# Patient Record
Sex: FEMALE | Race: OTHER | Employment: UNEMPLOYED | URBAN - METROPOLITAN AREA
[De-identification: names, ages, dates, MRNs, and addresses within clinical notes are randomized per-mention and may not be internally consistent; named-entity substitution may affect disease eponyms.]

---

## 2017-08-19 ENCOUNTER — APPOINTMENT (OUTPATIENT)
Dept: GENERAL RADIOLOGY | Age: 52
End: 2017-08-19
Attending: PHYSICIAN ASSISTANT
Payer: COMMERCIAL

## 2017-08-19 ENCOUNTER — HOSPITAL ENCOUNTER (EMERGENCY)
Age: 52
Discharge: HOME | End: 2017-08-20
Attending: EMERGENCY MEDICINE
Payer: COMMERCIAL

## 2017-08-19 VITALS
TEMPERATURE: 99.5 F | DIASTOLIC BLOOD PRESSURE: 81 MMHG | WEIGHT: 147 LBS | BODY MASS INDEX: 24.49 KG/M2 | SYSTOLIC BLOOD PRESSURE: 134 MMHG | RESPIRATION RATE: 18 BRPM | HEIGHT: 65 IN | HEART RATE: 96 BPM | OXYGEN SATURATION: 100 %

## 2017-08-19 DIAGNOSIS — S82.891A ANKLE FRACTURE, RIGHT, CLOSED, INITIAL ENCOUNTER: Primary | ICD-10-CM

## 2017-08-19 PROCEDURE — 75810000053 HC SPLINT APPLICATION

## 2017-08-19 PROCEDURE — 74011250636 HC RX REV CODE- 250/636: Performed by: PHYSICIAN ASSISTANT

## 2017-08-19 PROCEDURE — 96372 THER/PROPH/DIAG INJ SC/IM: CPT

## 2017-08-19 PROCEDURE — 99284 EMERGENCY DEPT VISIT MOD MDM: CPT

## 2017-08-19 PROCEDURE — 73610 X-RAY EXAM OF ANKLE: CPT

## 2017-08-19 PROCEDURE — 74011250637 HC RX REV CODE- 250/637: Performed by: PHYSICIAN ASSISTANT

## 2017-08-19 RX ORDER — HYDROMORPHONE HYDROCHLORIDE 1 MG/ML
1 INJECTION, SOLUTION INTRAMUSCULAR; INTRAVENOUS; SUBCUTANEOUS
Status: COMPLETED | OUTPATIENT
Start: 2017-08-19 | End: 2017-08-19

## 2017-08-19 RX ORDER — ONDANSETRON 8 MG/1
8 TABLET, ORALLY DISINTEGRATING ORAL
Status: COMPLETED | OUTPATIENT
Start: 2017-08-19 | End: 2017-08-19

## 2017-08-19 RX ORDER — OXYCODONE AND ACETAMINOPHEN 5; 325 MG/1; MG/1
TABLET ORAL
Qty: 20 TAB | Refills: 0 | Status: ON HOLD | OUTPATIENT
Start: 2017-08-19 | End: 2017-08-31

## 2017-08-19 RX ADMIN — ONDANSETRON 8 MG: 8 TABLET, ORALLY DISINTEGRATING ORAL at 22:57

## 2017-08-19 RX ADMIN — HYDROMORPHONE HYDROCHLORIDE 1 MG: 1 INJECTION, SOLUTION INTRAMUSCULAR; INTRAVENOUS; SUBCUTANEOUS at 22:58

## 2017-08-20 PROCEDURE — 74011250637 HC RX REV CODE- 250/637: Performed by: EMERGENCY MEDICINE

## 2017-08-20 PROCEDURE — 75810000053 HC SPLINT APPLICATION

## 2017-08-20 RX ORDER — HYDROMORPHONE HYDROCHLORIDE 1 MG/ML
1 INJECTION, SOLUTION INTRAMUSCULAR; INTRAVENOUS; SUBCUTANEOUS
Status: DISCONTINUED | OUTPATIENT
Start: 2017-08-20 | End: 2017-08-20 | Stop reason: CLARIF

## 2017-08-20 RX ORDER — OXYCODONE AND ACETAMINOPHEN 5; 325 MG/1; MG/1
1 TABLET ORAL
Status: COMPLETED | OUTPATIENT
Start: 2017-08-20 | End: 2017-08-20

## 2017-08-20 RX ADMIN — OXYCODONE HYDROCHLORIDE AND ACETAMINOPHEN 1 TABLET: 5; 325 TABLET ORAL at 02:28

## 2017-08-20 NOTE — DISCHARGE INSTRUCTIONS
Broken Ankle: Care Instructions  Your Care Instructions    An ankle may break (fracture) during sports, a fall, or other accidents. Fractures can range from a small, hairline crack, to a bone or bones broken into two or more pieces. Your treatment depends on how bad the break is. Your doctor may have put your ankle in a splint or cast to allow it to heal or to keep it stable until you see another doctor. It may take weeks or months for your ankle to heal. You can help your ankle heal with some care at home. You heal best when you take good care of yourself. Eat a variety of healthy foods, and don't smoke. You may have had a sedative to help you relax. You may be unsteady after having sedation. It can take a few hours for the medicine's effects to wear off. Common side effects of sedation include nausea, vomiting, and feeling sleepy or tired. The doctor has checked you carefully, but problems can develop later. If you notice any problems or new symptoms, get medical treatment right away. Follow-up care is a key part of your treatment and safety. Be sure to make and go to all appointments, and call your doctor if you are having problems. It's also a good idea to know your test results and keep a list of the medicines you take. How can you care for yourself at home? · If the doctor gave you a sedative:  ¨ For 24 hours, don't do anything that requires attention to detail. It takes time for the medicine's effects to completely wear off. ¨ For your safety, do not drive or operate any machinery that could be dangerous. Wait until the medicine wears off and you can think clearly and react easily. · Put ice or a cold pack on your ankle for 10 to 20 minutes at a time. Try to do this every 1 to 2 hours for the next 3 days (when you are awake). Put a thin cloth between the ice and your cast or splint. Keep your cast or splint dry. · Follow the cast care instructions your doctor gives you.  If you have a splint, do not take it off unless your doctor tells you to. · Be safe with medicines. Take pain medicines exactly as directed. ¨ If the doctor gave you a prescription medicine for pain, take it as prescribed. ¨ If you are not taking a prescription pain medicine, ask your doctor if you can take an over-the-counter medicine. · Prop up your leg on pillows in the first few days after the injury. Keep the ankle higher than the level of your heart. This will help reduce swelling. · Do not put weight on your ankle unless your doctor tells you to. Use crutches to walk. · Follow instructions for exercises to keep your leg strong. · Wiggle your toes often to reduce swelling and stiffness. When should you call for help? Call 911 anytime you think you may need emergency care. For example, call if:  · You have trouble breathing. · You passed out (lost consciousness). · You have sudden chest pain and shortness of breath, or you cough up blood. Call your doctor now or seek immediate medical care if:  · You have new or worse nausea or vomiting. · You have increased or severe pain. · Your foot is cool or pale or changes color. · You have tingling, weakness, or numbness in your toes. · Your cast or splint feels too tight. · You cannot move your toes. · You have signs of a blood clot, such as:  ¨ Pain in your calf, back of the knee, thigh, or groin. ¨ Redness and swelling in your leg or groin. · The skin under your cast or splint is burning or stinging. Watch closely for changes in your health, and be sure to contact your doctor if:  · You do not get better as expected. Where can you learn more? Go to http://angela-michelle.info/. Enter P763 in the search box to learn more about \"Broken Ankle: Care Instructions. \"  Current as of: March 21, 2017  Content Version: 11.3  © 6387-3957 [employer].  Care instructions adapted under license by [employer] (which disclaims liability or warranty for this information). If you have questions about a medical condition or this instruction, always ask your healthcare professional. [place] [address] any warranty or liability for your use of this information.

## 2017-08-20 NOTE — ED PROVIDER NOTES
HPI Comments: Seen at: 8/19/2017 11:03 PM    Carisa Bernstein is a 45 y.o. female with pertinent PMHx of plantar fasciitis and sinus tachycardia, presenting to the ED c/o right ankle pain post fall earlier tonight. Per pt, she has an orthopedic boot on. She loss her balance and fell. Pain is exacerbated by weight bearing. No other acute symptoms or complaints were noted. PCP: Samantha Ulrich MD          The history is provided by the patient. No  was used. Past Medical History:   Diagnosis Date    Colon polyps     Constipation     Heart murmur     Hemorrhoids     Mitral valve prolapse     Nausea & vomiting     \"VIOLENTLY SICK AFTER TUBAL LIGATION\"    Plantar fasciitis     Vestibular neuritis        Past Surgical History:   Procedure Laterality Date    HX BREAST LUMPECTOMY      HX COLONOSCOPY      2010    HX CYST REMOVAL  2010    right breast    HX GYN      hysterectomy    HX HEENT      tonselectomy    HX HEENT      lasik eye surgery    HX OTHER SURGICAL      cystoscopy    HX OTHER SURGICAL  2010    right ovary removed    HX TUBAL LIGATION           Family History:   Problem Relation Age of Onset    Cancer Sister      colon    Cancer Father      colon    Cancer Maternal Grandmother      colon       Social History     Social History    Marital status: SINGLE     Spouse name: N/A    Number of children: N/A    Years of education: N/A     Occupational History    Not on file. Social History Main Topics    Smoking status: Former Smoker    Smokeless tobacco: Never Used    Alcohol use No      Comment: social (1 Glass of wine per week)    Drug use: No    Sexual activity: Not on file     Other Topics Concern    Not on file     Social History Narrative         ALLERGIES: Latex; Amoxicillin; Flagyl [metronidazole]; and Urised [meth-hyos-atrp-m blue-ba-phsal]    Review of Systems   Constitutional: Negative. Negative for fever. HENT: Negative. Eyes: Negative. Respiratory: Negative. Cardiovascular: Negative. Gastrointestinal: Negative. Endocrine: Negative. Genitourinary: Negative. Musculoskeletal: Positive for arthralgias. Skin: Negative. Allergic/Immunologic: Negative. Neurological: Negative. Hematological: Negative. Psychiatric/Behavioral: Negative. All other systems reviewed and are negative. Vitals:    08/19/17 2246   BP: 134/81   Pulse: 96   Resp: 18   Temp: 99.5 °F (37.5 °C)   SpO2: 100%   Weight: 66.7 kg (147 lb)   Height: 5' 5\" (1.651 m)            Physical Exam   Constitutional: She is oriented to person, place, and time. She appears well-developed and well-nourished. No distress. HENT:   Head: Normocephalic. Right Ear: External ear normal.   Left Ear: External ear normal.   Mouth/Throat: No oropharyngeal exudate. Eyes: Conjunctivae and EOM are normal. Pupils are equal, round, and reactive to light. Right eye exhibits no discharge. Left eye exhibits no discharge. No scleral icterus. Neck: Normal range of motion. Neck supple. No JVD present. No tracheal deviation present. No thyromegaly present. Cardiovascular: Normal rate, regular rhythm, normal heart sounds and intact distal pulses. Exam reveals no gallop and no friction rub. No murmur heard. Pulmonary/Chest: Effort normal and breath sounds normal. No stridor. No respiratory distress. She has no wheezes. She has no rales. She exhibits no tenderness. Abdominal: Soft. Bowel sounds are normal. She exhibits no distension and no mass. There is no tenderness. There is no rebound and no guarding. Musculoskeletal: She exhibits no edema. Right ankle: She exhibits decreased range of motion (secondary to pain). She exhibits no swelling and normal pulse. Tenderness (significant). Lymphadenopathy:     She has no cervical adenopathy. Neurological: She is alert and oriented to person, place, and time. She displays normal reflexes. No cranial nerve deficit. She exhibits normal muscle tone. Coordination normal.   Skin: Skin is warm and dry. No rash noted. She is not diaphoretic. No erythema. No pallor. Nursing note and vitals reviewed. MDM  Number of Diagnoses or Management Options  Ankle fracture, right, closed, initial encounter:      Amount and/or Complexity of Data Reviewed  Tests in the radiology section of CPT®: ordered and reviewed (XR RT ankle)  Independent visualization of images, tracings, or specimens: yes (XR)      ED Course       Procedures    Vitals:  Patient Vitals for the past 12 hrs:   Temp Pulse Resp BP SpO2   08/19/17 2246 99.5 °F (37.5 °C) 96 18 134/81 100 %       Medications Ordered:  Medications   HYDROmorphone (PF) (DILAUDID) injection 1 mg (1 mg IntraMUSCular Given 8/19/17 2258)   ondansetron (ZOFRAN ODT) tablet 8 mg (8 mg Oral Given 8/19/17 2257)       X-ray, CT or radiology findings or impressions:  XR ANKLE RT MIN 3 V   Final Result      XR interpreted by myself showed tri-malleolus fx with no displacement. No other fx. Progress notes, consult notes, or additional procedure notes:  11:22 PM Consult: I discussed care with Dr. Sarah Mesa (ortho). It was a standard discussion including patient history, chief complaint, available diagnostic results, and predicted treatment course. Dr. Wero Singleton recommended posterior splint and follow up in clinic next week. 01:27 AM Pt has been reexamined. Splint placement was evaluated by myself. Neurovascularly intact. Patient has no new complaints, changes, or physical findings. Care plan outlined and precautions discussed. Results were reviewed with the patient. All medications were reviewed with the patient; will d/c home with percocet. All of pt's questions and concerns were addressed. Patient was instructed and agrees to follow up with Dr. Wero Singleton next week, as well as to return to the ED upon further deterioration. Patient is ready to go home.  I advised pt to a roll-a-bout for ambulation. Diagnosis:   1. Ankle fracture, right, closed, initial encounter        Disposition: Discharge    Follow-up Information     Follow up With Details Comments Le Shaffer MD Schedule an appointment as soon as possible for a visit Follow up with Dr. Ovidio Hair in clinic next week. [facility]  As needed, If symptoms worsen [address] [id number]  [phone]           Patient's Medications   Start Taking    OXYCODONE-ACETAMINOPHEN (PERCOCET) 5-325 MG PER TABLET    Take 1 to 2  tablet every 6 hours as needed for pain control. If you were instructed to try over the counter ibuprofen or tylenol, only take the percocet for pain not controlled with the over the counter medication. Continue Taking    MIRABEGRON (MYRBETRIQ PO)    Take  by mouth. These Medications have changed    No medications on file   Stop Taking    No medications on file       Scribe Attestation      Francia Ordonez acting as a scribe for and in the presence of Sparkle Franco MD      August 19, 2017 at 11:00 PM       Provider Attestation:      I personally performed the services described in the documentation, reviewed the documentation, as recorded by the scribe in my presence, and it accurately and completely records my words and actions.  August 19, 2017 at 11:00 PM - Sparkle Franco MD

## 2017-08-20 NOTE — ED NOTES
I performed a brief evaluation, including history and physical, of the patient here in triage and I have determined that pt will need further treatment and evaluation from the main side ER physician. I have placed initial orders to help in expediting patients care.      August 19, 2017 at 10:56 PM - JOY Garg        Visit Vitals    /81 (BP Patient Position: At rest)    Pulse 96    Temp 99.5 °F (37.5 °C)    Resp 18    Ht 5' 5\" (1.651 m)    Wt 66.7 kg (147 lb)    SpO2 100%    BMI 24.46 kg/m2

## 2017-08-29 ENCOUNTER — ANESTHESIA EVENT (OUTPATIENT)
Dept: SURGERY | Age: 52
End: 2017-08-29
Payer: COMMERCIAL

## 2017-08-30 ENCOUNTER — HOSPITAL ENCOUNTER (OUTPATIENT)
Age: 52
Setting detail: OBSERVATION
LOS: 1 days | Discharge: HOME | End: 2017-08-31
Attending: ORTHOPAEDIC SURGERY | Admitting: ORTHOPAEDIC SURGERY
Payer: COMMERCIAL

## 2017-08-30 ENCOUNTER — ANESTHESIA (OUTPATIENT)
Dept: SURGERY | Age: 52
End: 2017-08-30
Payer: COMMERCIAL

## 2017-08-30 ENCOUNTER — APPOINTMENT (OUTPATIENT)
Dept: GENERAL RADIOLOGY | Age: 52
End: 2017-08-30
Attending: ORTHOPAEDIC SURGERY
Payer: COMMERCIAL

## 2017-08-30 PROBLEM — S82.839A FRACTURE OF DISTAL END OF TIBIA WITH FIBULA: Status: ACTIVE | Noted: 2017-08-30

## 2017-08-30 PROBLEM — S82.309A FRACTURE OF DISTAL END OF TIBIA WITH FIBULA: Status: ACTIVE | Noted: 2017-08-30

## 2017-08-30 PROCEDURE — 74011000250 HC RX REV CODE- 250: Performed by: ORTHOPAEDIC SURGERY

## 2017-08-30 PROCEDURE — 77030008462 HC STPLR SKN PROX J&J -A: Performed by: ORTHOPAEDIC SURGERY

## 2017-08-30 PROCEDURE — 77030000032 HC CUF TRNQT ZIMM -B: Performed by: ORTHOPAEDIC SURGERY

## 2017-08-30 PROCEDURE — 77030016470 HC BIT DRL QC1 SYNT -C: Performed by: ORTHOPAEDIC SURGERY

## 2017-08-30 PROCEDURE — 77030010509 HC AIRWY LMA MSK TELE -A: Performed by: ANESTHESIOLOGY

## 2017-08-30 PROCEDURE — 74011000258 HC RX REV CODE- 258

## 2017-08-30 PROCEDURE — C1713 ANCHOR/SCREW BN/BN,TIS/BN: HCPCS | Performed by: ORTHOPAEDIC SURGERY

## 2017-08-30 PROCEDURE — 64450 NJX AA&/STRD OTHER PN/BRANCH: CPT | Performed by: ANESTHESIOLOGY

## 2017-08-30 PROCEDURE — 77030000031 HC BIT DRL QC SYNT -C: Performed by: ORTHOPAEDIC SURGERY

## 2017-08-30 PROCEDURE — 99218 HC RM OBSERVATION: CPT

## 2017-08-30 PROCEDURE — 77030003601 HC NDL NRV BLK BBMI -A: Performed by: ORTHOPAEDIC SURGERY

## 2017-08-30 PROCEDURE — 74011250636 HC RX REV CODE- 250/636: Performed by: ORTHOPAEDIC SURGERY

## 2017-08-30 PROCEDURE — 74011000250 HC RX REV CODE- 250

## 2017-08-30 PROCEDURE — 77030032490 HC SLV COMPR SCD KNE COVD -B: Performed by: ORTHOPAEDIC SURGERY

## 2017-08-30 PROCEDURE — 77030012890: Performed by: ORTHOPAEDIC SURGERY

## 2017-08-30 PROCEDURE — 74011000258 HC RX REV CODE- 258: Performed by: ORTHOPAEDIC SURGERY

## 2017-08-30 PROCEDURE — 77030003862 HC BIT DRL SYNT -B: Performed by: ORTHOPAEDIC SURGERY

## 2017-08-30 PROCEDURE — 77030002934 HC SUT MCRYL J&J -B: Performed by: ORTHOPAEDIC SURGERY

## 2017-08-30 PROCEDURE — 77030011640 HC PAD GRND REM COVD -A: Performed by: ORTHOPAEDIC SURGERY

## 2017-08-30 PROCEDURE — 74011250636 HC RX REV CODE- 250/636: Performed by: NURSE ANESTHETIST, CERTIFIED REGISTERED

## 2017-08-30 PROCEDURE — 77030020782 HC GWN BAIR PAWS FLX 3M -B: Performed by: ORTHOPAEDIC SURGERY

## 2017-08-30 PROCEDURE — 77030008847 HC WRE K SYNT -A: Performed by: ORTHOPAEDIC SURGERY

## 2017-08-30 PROCEDURE — 73610 X-RAY EXAM OF ANKLE: CPT

## 2017-08-30 PROCEDURE — 77030027138 HC INCENT SPIROMETER -A

## 2017-08-30 PROCEDURE — 76010000134 HC OR TIME 3.5 TO 4 HR: Performed by: ORTHOPAEDIC SURGERY

## 2017-08-30 PROCEDURE — 74011250636 HC RX REV CODE- 250/636: Performed by: ANESTHESIOLOGY

## 2017-08-30 PROCEDURE — 77030011265 HC ELECTRD BLD HEX COVD -A: Performed by: ORTHOPAEDIC SURGERY

## 2017-08-30 PROCEDURE — 74011250636 HC RX REV CODE- 250/636

## 2017-08-30 PROCEDURE — 76060000038 HC ANESTHESIA 3.5 TO 4 HR: Performed by: ORTHOPAEDIC SURGERY

## 2017-08-30 PROCEDURE — 74011250637 HC RX REV CODE- 250/637: Performed by: ANESTHESIOLOGY

## 2017-08-30 PROCEDURE — 77030018836 HC SOL IRR NACL ICUM -A: Performed by: ORTHOPAEDIC SURGERY

## 2017-08-30 PROCEDURE — 76942 ECHO GUIDE FOR BIOPSY: CPT | Performed by: ANESTHESIOLOGY

## 2017-08-30 PROCEDURE — 74011250637 HC RX REV CODE- 250/637: Performed by: NURSE ANESTHETIST, CERTIFIED REGISTERED

## 2017-08-30 PROCEDURE — 77030031139 HC SUT VCRL2 J&J -A: Performed by: ORTHOPAEDIC SURGERY

## 2017-08-30 PROCEDURE — 77030034850: Performed by: ORTHOPAEDIC SURGERY

## 2017-08-30 PROCEDURE — 76210000006 HC OR PH I REC 0.5 TO 1 HR: Performed by: ORTHOPAEDIC SURGERY

## 2017-08-30 PROCEDURE — 77030012012 HC AIRWY OP SFT TELE -A: Performed by: ANESTHESIOLOGY

## 2017-08-30 DEVICE — SCREW BNE L42MM DIA2.7MM ANK S STL ST VAR ANG LOK FULL THRD: Type: IMPLANTABLE DEVICE | Site: TIBIA | Status: FUNCTIONAL

## 2017-08-30 DEVICE — K WIRE FIX L150MM DIA2MM S STL W/ TRCR PNT: Type: IMPLANTABLE DEVICE | Site: FIBULA | Status: FUNCTIONAL

## 2017-08-30 DEVICE — 2.7MM VA LCKNG SCREW SLF-TPNG WITH T8 STARDRIVE RECESS 18MM: Type: IMPLANTABLE DEVICE | Site: TIBIA | Status: FUNCTIONAL

## 2017-08-30 DEVICE — SCREW BNE L30MM DIA3.5MM CORT S STL ST FULL THRD T15 DRV: Type: IMPLANTABLE DEVICE | Site: TIBIA | Status: FUNCTIONAL

## 2017-08-30 DEVICE — IMPLANTABLE DEVICE: Type: IMPLANTABLE DEVICE | Site: TIBIA | Status: FUNCTIONAL

## 2017-08-30 DEVICE — SCREW BNE L38MM DIA2.7MM S STL ST VAR ANG LOK FULL THRD T8: Type: IMPLANTABLE DEVICE | Site: TIBIA | Status: FUNCTIONAL

## 2017-08-30 DEVICE — PLATE BNE L92MM 4 H R LAT DST FIBULAR S STL VAR ANG LOK: Type: IMPLANTABLE DEVICE | Site: FIBULA | Status: FUNCTIONAL

## 2017-08-30 DEVICE — SCREW BNE L40MM DIA2.7MM MTPHSEAL EL S STL ST VAR ANG LOK: Type: IMPLANTABLE DEVICE | Site: TIBIA | Status: FUNCTIONAL

## 2017-08-30 DEVICE — SCREW BNE L34MM DIA2.7MM CORT S STL ST T8 STARDRV RECESS: Type: IMPLANTABLE DEVICE | Site: FIBULA | Status: FUNCTIONAL

## 2017-08-30 DEVICE — SCREW BNE L24MM DIA3.5MM ANK S STL LOK ST STARDRV RECESS: Type: IMPLANTABLE DEVICE | Site: TIBIA | Status: FUNCTIONAL

## 2017-08-30 DEVICE — SCREW BNE L14MM DIA2.7MM ANK S STL ST VAR ANG LOK FULL THRD: Type: IMPLANTABLE DEVICE | Site: FIBULA | Status: FUNCTIONAL

## 2017-08-30 DEVICE — SCREW BNE L24MM DIA3.5MM CORT S STL ST FULL THRD T15: Type: IMPLANTABLE DEVICE | Site: TIBIA | Status: FUNCTIONAL

## 2017-08-30 DEVICE — SCREW BNE L12MM DIA2.7MM ANK S STL ST VAR ANG LOK FULL THRD: Type: IMPLANTABLE DEVICE | Site: TIBIA | Status: FUNCTIONAL

## 2017-08-30 DEVICE — SCREW BNE L26MM DIA2.7MM CORT S STL ST T8 STARDRV RECESS: Type: IMPLANTABLE DEVICE | Site: FIBULA | Status: FUNCTIONAL

## 2017-08-30 DEVICE — SCREW BNE L30MM DIA2.7MM CORT S STL ST T8 STARDRV RECESS: Type: IMPLANTABLE DEVICE | Site: FIBULA | Status: FUNCTIONAL

## 2017-08-30 RX ORDER — FENTANYL CITRATE 50 UG/ML
100 INJECTION, SOLUTION INTRAMUSCULAR; INTRAVENOUS ONCE
Status: COMPLETED | OUTPATIENT
Start: 2017-08-30 | End: 2017-08-30

## 2017-08-30 RX ORDER — HYDROMORPHONE HYDROCHLORIDE 1 MG/ML
1 INJECTION, SOLUTION INTRAMUSCULAR; INTRAVENOUS; SUBCUTANEOUS
Status: DISCONTINUED | OUTPATIENT
Start: 2017-08-30 | End: 2017-08-31 | Stop reason: HOSPADM

## 2017-08-30 RX ORDER — PROPOFOL 10 MG/ML
INJECTION, EMULSION INTRAVENOUS AS NEEDED
Status: DISCONTINUED | OUTPATIENT
Start: 2017-08-30 | End: 2017-08-30 | Stop reason: HOSPADM

## 2017-08-30 RX ORDER — FAMOTIDINE 20 MG/1
20 TABLET, FILM COATED ORAL ONCE
Status: COMPLETED | OUTPATIENT
Start: 2017-08-30 | End: 2017-08-30

## 2017-08-30 RX ORDER — ONDANSETRON 4 MG/1
4-8 TABLET, ORALLY DISINTEGRATING ORAL
Status: DISCONTINUED | OUTPATIENT
Start: 2017-08-30 | End: 2017-08-31 | Stop reason: HOSPADM

## 2017-08-30 RX ORDER — ONDANSETRON 2 MG/ML
4 INJECTION INTRAMUSCULAR; INTRAVENOUS ONCE
Status: DISCONTINUED | OUTPATIENT
Start: 2017-08-30 | End: 2017-08-30 | Stop reason: HOSPADM

## 2017-08-30 RX ORDER — FENTANYL CITRATE 50 UG/ML
INJECTION, SOLUTION INTRAMUSCULAR; INTRAVENOUS AS NEEDED
Status: DISCONTINUED | OUTPATIENT
Start: 2017-08-30 | End: 2017-08-30 | Stop reason: HOSPADM

## 2017-08-30 RX ORDER — SODIUM CHLORIDE 0.9 % (FLUSH) 0.9 %
5-10 SYRINGE (ML) INJECTION AS NEEDED
Status: DISCONTINUED | OUTPATIENT
Start: 2017-08-30 | End: 2017-08-31 | Stop reason: HOSPADM

## 2017-08-30 RX ORDER — LIDOCAINE HYDROCHLORIDE 20 MG/ML
INJECTION, SOLUTION EPIDURAL; INFILTRATION; INTRACAUDAL; PERINEURAL AS NEEDED
Status: DISCONTINUED | OUTPATIENT
Start: 2017-08-30 | End: 2017-08-30 | Stop reason: HOSPADM

## 2017-08-30 RX ORDER — SCOLOPAMINE TRANSDERMAL SYSTEM 1 MG/1
1.5 PATCH, EXTENDED RELEASE TRANSDERMAL
Status: DISCONTINUED | OUTPATIENT
Start: 2017-08-30 | End: 2017-08-31 | Stop reason: HOSPADM

## 2017-08-30 RX ORDER — HYDROMORPHONE HYDROCHLORIDE 2 MG/ML
0.5 INJECTION, SOLUTION INTRAMUSCULAR; INTRAVENOUS; SUBCUTANEOUS
Status: DISCONTINUED | OUTPATIENT
Start: 2017-08-30 | End: 2017-08-30 | Stop reason: HOSPADM

## 2017-08-30 RX ORDER — ONDANSETRON 2 MG/ML
INJECTION INTRAMUSCULAR; INTRAVENOUS AS NEEDED
Status: DISCONTINUED | OUTPATIENT
Start: 2017-08-30 | End: 2017-08-30 | Stop reason: HOSPADM

## 2017-08-30 RX ORDER — SODIUM CHLORIDE 0.9 % (FLUSH) 0.9 %
5-10 SYRINGE (ML) INJECTION EVERY 8 HOURS
Status: DISCONTINUED | OUTPATIENT
Start: 2017-08-30 | End: 2017-08-31 | Stop reason: HOSPADM

## 2017-08-30 RX ORDER — SODIUM CHLORIDE 0.9 % (FLUSH) 0.9 %
5-10 SYRINGE (ML) INJECTION AS NEEDED
Status: DISCONTINUED | OUTPATIENT
Start: 2017-08-30 | End: 2017-08-30 | Stop reason: HOSPADM

## 2017-08-30 RX ORDER — DEXAMETHASONE SODIUM PHOSPHATE 4 MG/ML
INJECTION, SOLUTION INTRA-ARTICULAR; INTRALESIONAL; INTRAMUSCULAR; INTRAVENOUS; SOFT TISSUE AS NEEDED
Status: DISCONTINUED | OUTPATIENT
Start: 2017-08-30 | End: 2017-08-30 | Stop reason: HOSPADM

## 2017-08-30 RX ORDER — ROPIVACAINE HYDROCHLORIDE 5 MG/ML
30 INJECTION, SOLUTION EPIDURAL; INFILTRATION; PERINEURAL
Status: COMPLETED | OUTPATIENT
Start: 2017-08-30 | End: 2017-08-30

## 2017-08-30 RX ORDER — SODIUM CHLORIDE 0.9 % (FLUSH) 0.9 %
5-10 SYRINGE (ML) INJECTION EVERY 8 HOURS
Status: DISCONTINUED | OUTPATIENT
Start: 2017-08-30 | End: 2017-08-30 | Stop reason: HOSPADM

## 2017-08-30 RX ORDER — CEFAZOLIN SODIUM 2 G/50ML
2 SOLUTION INTRAVENOUS ONCE
Status: DISCONTINUED | OUTPATIENT
Start: 2017-08-30 | End: 2017-08-30 | Stop reason: HOSPADM

## 2017-08-30 RX ORDER — GLYCOPYRROLATE 0.2 MG/ML
INJECTION INTRAMUSCULAR; INTRAVENOUS AS NEEDED
Status: DISCONTINUED | OUTPATIENT
Start: 2017-08-30 | End: 2017-08-30 | Stop reason: HOSPADM

## 2017-08-30 RX ORDER — SODIUM CHLORIDE, SODIUM LACTATE, POTASSIUM CHLORIDE, CALCIUM CHLORIDE 600; 310; 30; 20 MG/100ML; MG/100ML; MG/100ML; MG/100ML
100 INJECTION, SOLUTION INTRAVENOUS CONTINUOUS
Status: DISCONTINUED | OUTPATIENT
Start: 2017-08-30 | End: 2017-08-30 | Stop reason: HOSPADM

## 2017-08-30 RX ORDER — OXYCODONE AND ACETAMINOPHEN 5; 325 MG/1; MG/1
1-2 TABLET ORAL
Status: DISCONTINUED | OUTPATIENT
Start: 2017-08-30 | End: 2017-08-31 | Stop reason: HOSPADM

## 2017-08-30 RX ORDER — SODIUM CHLORIDE 9 MG/ML
125 INJECTION, SOLUTION INTRAVENOUS CONTINUOUS
Status: DISCONTINUED | OUTPATIENT
Start: 2017-08-30 | End: 2017-08-31 | Stop reason: HOSPADM

## 2017-08-30 RX ORDER — MIDAZOLAM HYDROCHLORIDE 1 MG/ML
2 INJECTION, SOLUTION INTRAMUSCULAR; INTRAVENOUS ONCE
Status: COMPLETED | OUTPATIENT
Start: 2017-08-30 | End: 2017-08-30

## 2017-08-30 RX ADMIN — Medication 10 ML: at 22:00

## 2017-08-30 RX ADMIN — SODIUM CHLORIDE, SODIUM LACTATE, POTASSIUM CHLORIDE, AND CALCIUM CHLORIDE 100 ML/HR: 600; 310; 30; 20 INJECTION, SOLUTION INTRAVENOUS at 12:07

## 2017-08-30 RX ADMIN — FENTANYL CITRATE 50 MCG: 50 INJECTION, SOLUTION INTRAMUSCULAR; INTRAVENOUS at 16:38

## 2017-08-30 RX ADMIN — ROPIVACAINE HYDROCHLORIDE 150 MG: 5 INJECTION, SOLUTION EPIDURAL; INFILTRATION; PERINEURAL at 13:40

## 2017-08-30 RX ADMIN — FAMOTIDINE 20 MG: 20 TABLET, FILM COATED ORAL at 12:07

## 2017-08-30 RX ADMIN — FENTANYL CITRATE 50 MCG: 50 INJECTION, SOLUTION INTRAMUSCULAR; INTRAVENOUS at 18:43

## 2017-08-30 RX ADMIN — DEXAMETHASONE SODIUM PHOSPHATE 4 MG: 4 INJECTION, SOLUTION INTRA-ARTICULAR; INTRALESIONAL; INTRAMUSCULAR; INTRAVENOUS; SOFT TISSUE at 15:24

## 2017-08-30 RX ADMIN — PROPOFOL 200 MG: 10 INJECTION, EMULSION INTRAVENOUS at 15:23

## 2017-08-30 RX ADMIN — SODIUM CHLORIDE 600 MG: 900 INJECTION, SOLUTION INTRAVENOUS at 23:22

## 2017-08-30 RX ADMIN — FENTANYL CITRATE 50 MCG: 50 INJECTION, SOLUTION INTRAMUSCULAR; INTRAVENOUS at 15:47

## 2017-08-30 RX ADMIN — ONDANSETRON 4 MG: 2 INJECTION INTRAMUSCULAR; INTRAVENOUS at 18:18

## 2017-08-30 RX ADMIN — ONDANSETRON 4 MG: 2 INJECTION INTRAMUSCULAR; INTRAVENOUS at 15:30

## 2017-08-30 RX ADMIN — GLYCOPYRROLATE 0.2 MG: 0.2 INJECTION INTRAMUSCULAR; INTRAVENOUS at 15:17

## 2017-08-30 RX ADMIN — FENTANYL CITRATE 50 MCG: 50 INJECTION, SOLUTION INTRAMUSCULAR; INTRAVENOUS at 15:42

## 2017-08-30 RX ADMIN — SODIUM CHLORIDE, SODIUM LACTATE, POTASSIUM CHLORIDE, AND CALCIUM CHLORIDE: 600; 310; 30; 20 INJECTION, SOLUTION INTRAVENOUS at 16:50

## 2017-08-30 RX ADMIN — SODIUM CHLORIDE 600 MG: 900 INJECTION, SOLUTION INTRAVENOUS at 15:20

## 2017-08-30 RX ADMIN — MIDAZOLAM HYDROCHLORIDE 2 MG: 1 INJECTION, SOLUTION INTRAMUSCULAR; INTRAVENOUS at 13:30

## 2017-08-30 RX ADMIN — FENTANYL CITRATE 50 MCG: 50 INJECTION, SOLUTION INTRAMUSCULAR; INTRAVENOUS at 17:21

## 2017-08-30 RX ADMIN — LIDOCAINE HYDROCHLORIDE 60 MG: 20 INJECTION, SOLUTION EPIDURAL; INFILTRATION; INTRACAUDAL; PERINEURAL at 15:23

## 2017-08-30 RX ADMIN — SODIUM CHLORIDE 125 ML/HR: 900 INJECTION, SOLUTION INTRAVENOUS at 20:00

## 2017-08-30 RX ADMIN — FENTANYL CITRATE 100 MCG: 50 INJECTION INTRAMUSCULAR; INTRAVENOUS at 13:30

## 2017-08-30 NOTE — PROCEDURES
Op note [id number]    Pre-op DX: R distal tibia intraarticular fracture (Pilon fracture), distal fibula fracture  Post-op DX: Same  Procedure: ORIF R tibia pilon fracture, distal fibula fracture  Anesthesia: GETA, block  Surgeon: Kamala Booker  Antibiotics: 600mg clindamycin  EBL: 50mL  TT: 120 minutes  Implants:  -Synthes 3.5mm distal tibial locking plate  -Synthes 8.8HI distal fibula plate    Plan:    -strict elevation RLE  -neurovascular checks  -NWB RLE  -cast precautions  -clindamycin x 23 hours  -PT/OT  -pain control  -admit to ortho  -routine post-op care

## 2017-08-30 NOTE — IP AVS SNAPSHOT
[facility] 
 
 
 [address] Patient: Krishna Tan MRN: [id number] ABC:[id number] Current Discharge Medication List  
  
START taking these medications Dose & Instructions Dispensing Information Comments Morning Noon Evening Bedtime  
 ondansetron 4 mg disintegrating tablet Commonly known as:  ZOFRAN ODT Your last dose was: Your next dose is:    
   
   
 Dose:  4-8 mg Take 1-2 Tabs by mouth every eight (8) hours as needed. Indications: PREVENTION OF POST-OPERATIVE NAUSEA AND VOMITING Quantity:  15 Tab Refills:  0 CONTINUE these medications which have NOT CHANGED Dose & Instructions Dispensing Information Comments Morning Noon Evening Bedtime  
 oxyCODONE-acetaminophen 5-325 mg per tablet Commonly known as:  PERCOCET Your last dose was: Your next dose is: Take 1 to 2  tablet every 6 hours as needed for pain control. If you were instructed to try over the counter ibuprofen or tylenol, only take the percocet for pain not controlled with the over the counter medication. Quantity:  40 Tab Refills:  0 Where to Get Your Medications Information on where to get these meds will be given to you by the nurse or doctor. ! Ask your nurse or doctor about these medications  
  ondansetron 4 mg disintegrating tablet  
 oxyCODONE-acetaminophen 5-325 mg per tablet

## 2017-08-30 NOTE — ANESTHESIA PREPROCEDURE EVALUATION
Anesthetic History     PONV          Review of Systems / Medical History  Patient summary reviewed, nursing notes reviewed and pertinent labs reviewed    Pulmonary  Within defined limits                 Neuro/Psych   Within defined limits           Cardiovascular  Within defined limits                Exercise tolerance: >4 METS     GI/Hepatic/Renal  Within defined limits              Endo/Other  Within defined limits           Other Findings   Comments: Risk Factors for Postoperative nausea/vomiting:       History of postoperative nausea/vomiting? YES       Female? YES       Motion sickness? NO       Intended opioid administration for postoperative analgesia? NO      Smoking Abstinence  Current Smoker? NO  Elective Surgery? YES  Seen preoperatively by anesthesiologist or proxy prior to day of surgery? YES  Pt abstained from smoking 24 hours prior to anesthesia?  N/A           Physical Exam    Airway  Mallampati: II  TM Distance: 4 - 6 cm  Neck ROM: normal range of motion   Mouth opening: Normal     Cardiovascular  Regular rate and rhythm,  S1 and S2 normal,  no murmur, click, rub, or gallop             Dental    Dentition: Poor dentition     Pulmonary  Breath sounds clear to auscultation               Abdominal  GI exam deferred       Other Findings            Anesthetic Plan    ASA: 2  Anesthesia type: general      Post-op pain plan if not by surgeon: peripheral nerve block single    Induction: Intravenous  Anesthetic plan and risks discussed with: Patient and Sibling

## 2017-08-30 NOTE — PERIOP NOTES
TRANSFER - OUT REPORT:    Verbal report given to [employer] on Alex Murdock  being transferred to [facility] for routine post - op       Report consisted of patients Situation, Background, Assessment and   Recommendations(SBAR). Information from the following report(s) SBAR, OR Summary, Procedure Summary, Intake/Output, MAR and Recent Results was reviewed with the receiving nurse. Lines:       Opportunity for questions and clarification was provided.       Patient transported with:   Registered Nurse

## 2017-08-30 NOTE — ANESTHESIA PROCEDURE NOTES
Peripheral Block    Start time: 8/30/2017 1:45 PM  End time: 8/30/2017 2:00 PM  Performed by: Lulú Mariscal  Authorized by: Lulú Mariscal       Pre-procedure:    Indications: at surgeon's request, post-op pain management and procedure for pain    Preanesthetic Checklist: patient identified, risks and benefits discussed, site marked, timeout performed, anesthesia consent given and patient being monitored    Timeout Time: 13:29          Block Type:   Block Type:  Popliteal  Laterality:  Right  Monitoring:  Standard ASA monitoring, continuous pulse ox, frequent vital sign checks, heart rate, responsive to questions and oxygen  Injection Technique:  Single shot  Procedures: ultrasound guided    Patient Position: left lateral decubitus  Prep: chlorhexidine    Location:  Lower thigh  Needle Gauge:  21 G  Needle Localization:  Ultrasound guidance  Medication Injected:  0.5%  ropivacaine  Volume (mL):  20    Assessment:  Number of attempts:  1  Injection Assessment:  Incremental injection every 5 mL, no paresthesia, ultrasound image on chart, local visualized surrounding nerve on ultrasound, negative aspiration for blood and no intravascular symptoms  Patient tolerance:  Patient tolerated the procedure well with no immediate complications

## 2017-08-30 NOTE — IP AVS SNAPSHOT
Preston Holder 
 
 
 [address] [facility] [address] [place] [address] Patient: Tressa Red MRN: [id number] You are allergic to the following Allergen Reactions Latex Other (comments) \"YEAST INFECTION\" Amoxicillin Other (comments) Gave pt bad ha's Flagyl (Metronidazole) Other (comments) Pt states headaches and vomiting Urised (Meth-Hyos-Atrp-M Blue-Ba-Phsal) Itching Swelling Recent Documentation Height Weight BMI OB Status Smoking Status 1.676 m 66.7 kg 23.73 kg/m2 Hysterectomy Former Smoker Emergency Contacts Name Discharge Info Relation Home Work Mobile Pauly El DISCHARGE CAREGIVER [3] Sister [23] [phone] About your hospitalization You were admitted on:  August 30, 2017 You last received care in the:  [facility] [address] [facility] You were discharged on:  August 31, 2017 Unit phone number:  [phone] Why you were hospitalized Your primary diagnosis was:  Not on File Your diagnoses also included:  Fracture Of Distal End Of Tibia With Fibula Providers Seen During Your Hospitalizations Provider Role Specialty Primary office phone Michael Gore MD Attending Provider Orthopedic Surgery [phone] Your Primary Care Physician (PCP) Primary Care Physician Office Phone Office Fax Leidy Rice [phone] Follow-up Information Follow up With Details Comments Contact Info Caty Chaney MD On 9/12/2017 September 12, 2017 @ 09:00 with Dr. Althea Sousa office is booked this is the only appt that the office had available for the patient. [address] [facility] [address] [address] [facility] [address] 
[phone] Michael Gore MD On 9/11/2017 September 11, 2017 @ 2:15 pm [facility] Dr. Benjamin Peng. [address] [facility] [address] [facility] [address] 
[phone] Current Discharge Medication List  
  
 START taking these medications Dose & Instructions Dispensing Information Comments Morning Noon Evening Bedtime  
 ondansetron 4 mg disintegrating tablet Commonly known as:  ZOFRAN ODT Your last dose was: Your next dose is:    
   
   
 Dose:  4-8 mg Take 1-2 Tabs by mouth every eight (8) hours as needed. Indications: PREVENTION OF POST-OPERATIVE NAUSEA AND VOMITING Quantity:  15 Tab Refills:  0 CONTINUE these medications which have NOT CHANGED Dose & Instructions Dispensing Information Comments Morning Noon Evening Bedtime  
 oxyCODONE-acetaminophen 5-325 mg per tablet Commonly known as:  PERCOCET Your last dose was: Your next dose is: Take 1 to 2  tablet every 6 hours as needed for pain control. If you were instructed to try over the counter ibuprofen or tylenol, only take the percocet for pain not controlled with the over the counter medication. Quantity:  40 Tab Refills:  0 Where to Get Your Medications Information on where to get these meds will be given to you by the nurse or doctor. ! Ask your nurse or doctor about these medications  
  ondansetron 4 mg disintegrating tablet  
 oxyCODONE-acetaminophen 5-325 mg per tablet Discharge Instructions Broken Lower Leg: Care Instructions Your Care Instructions Treatment for your broken leg will depend on how bad the break is. Your doctor may have put your lower leg in a splint or a cast to allow it to heal or keep it stable until you see another doctor. It may take weeks or months for your leg to heal. You can help it heal with some care at home. You heal best when you take good care of yourself. Eat a variety of healthy foods, and don't smoke. Follow-up care is a key part of your treatment and safety.  Be sure to make and go to all appointments, and call your doctor if you are having problems. It's also a good idea to know your test results and keep a list of the medicines you take. How can you care for yourself at home? · Put ice or a cold pack on your lower leg for 10 to 20 minutes at a time. Try to do this every 1 to 2 hours for the next 3 days (when you are awake). Put a thin cloth between the ice and your cast or splint. Keep your cast or splint dry. · Follow the cast care instructions your doctor gives you. If you have a splint, do not take it off unless your doctor tells you to. · Be safe with medicines. Take pain medicines exactly as directed. ¨ If the doctor gave you a prescription medicine for pain, take it as prescribed. ¨ If you are not taking a prescription pain medicine, ask your doctor if you can take an over-the-counter medicine. · Do not put weight on your leg unless your doctor tells you to. Use crutches to walk. · Prop up your leg on pillows when you sit or lie down in the first few days after the injury. Keep your leg higher than the level of your heart. This will help reduce swelling. · Follow instructions for exercises to keep your leg strong. · Wiggle your toes often to reduce swelling and stiffness. When should you call for help? Call 911 anytime you think you may need emergency care. For example, call if: 
· You have sudden chest pain and shortness of breath, or you cough up blood. Call your doctor now or seek immediate medical care if: 
· You have increased or severe pain. · Your foot is cool or pale or changes color. · You have tingling, weakness, or numbness in your toes. · Your cast or splint feels too tight. · You cannot move your toes. · You have signs of a blood clot, such as: 
¨ Pain in your calf, back of the knee, thigh, or groin. ¨ Redness and swelling in your leg or groin. · The skin under your cast or splint is burning or stinging. Watch closely for changes in your health, and be sure to contact your doctor if: · You do not get better as expected. Where can you learn more? Go to http://angela-michelle.info/. Enter L198 in the search box to learn more about \"Broken Lower Leg: Care Instructions. \" Current as of: 2017 Content Version: 11.3 © 4338-2307 [employer]. Care instructions adapted under license by [employer] (which disclaims liability or warranty for this information). If you have questions about a medical condition or this instruction, always ask your healthcare professional. [place] [address] any warranty or liability for your use of this information. Patient armband removed and shredded MyChart Activation Thank you for requesting access to [employer]. Please follow the instructions below to securely access and download your online medical record. [employer] allows you to send messages to your doctor, view your test results, renew your prescriptions, schedule appointments, and more. How Do I Sign Up? 1. In your internet browser, go to [email or web address] 
2. Click on the First Time User? Click Here link in the Sign In box. You will be redirect to the New Member Sign Up page. 3. Enter your [employer] Access Code exactly as it appears below. You will not need to use this code after youve completed the sign-up process. If you do not sign up before the expiration date, you must request a new code. [employer] Access Code: [email or web address] Expires: 2017 11:41 PM (This is the date your [employer] access code will ) 4. Enter the last four digits of your Social Security Number (xxxx) and Date of Birth (mm/dd/yyyy) as indicated and click Submit. You will be taken to the next sign-up page. 5. Create a [employer] ID. This will be your [employer] login ID and cannot be changed, so think of one that is secure and easy to remember. 6. Create a [employer] password. You can change your password at any time. 7. Enter your Password Reset Question and Answer. This can be used at a later time if you forget your password. 8. Enter your e-mail address. You will receive e-mail notification when new information is available in [address] 9. Click Sign Up. You can now view and download portions of your medical record. 10. Click the Download Summary menu link to download a portable copy of your medical information. Additional Information If you have questions, please visit the Frequently Asked Questions section of the [employer] website at [email or web address] Remember, [employer] is NOT to be used for urgent needs. For medical emergencies, dial 911. DISCHARGE SUMMARY from Nurse The following personal items are in your possession at time of discharge: 
 
Dental Appliances: None Visual Aid: None Jewelry: None Clothing: Pants, Shirt, Undergarments, Footwear Other Valuables: None PATIENT INSTRUCTIONS: 
 
 
F-face looks uneven A-arms unable to move or move unevenly S-speech slurred or non-existent T-time-call 911 as soon as signs and symptoms begin-DO NOT go Back to bed or wait to see if you get better-TIME IS BRAIN. Warning Signs of HEART ATTACK Call 911 if you have these symptoms: 
? Chest discomfort. Most heart attacks involve discomfort in the center of the chest that lasts more than a few minutes, or that goes away and comes back. It can feel like uncomfortable pressure, squeezing, fullness, or pain. ? Discomfort in other areas of the upper body. Symptoms can include pain or discomfort in one or both arms, the back, neck, jaw, or stomach. ? Shortness of breath with or without chest discomfort. ? Other signs may include breaking out in a cold sweat, nausea, or lightheadedness. Don't wait more than five minutes to call [address]! Fast action can save your life. Calling 911 is almost always the fastest way to get lifesaving treatment. Emergency Medical Services staff can begin treatment when they arrive  up to an hour sooner than if someone gets to the hospital by car. The discharge information has been reviewed with the patient. The patient verbalized understanding. Discharge medications reviewed with the patient and appropriate educational materials and side effects teaching were provided. Discharge Instructions Attachments/References ONDANSETRON (BY MOUTH, INTO THE MOUTH) (ENGLISH) Discharge Orders None Introducing [facility]! [facility] introduces [employer] patient portal. Now you can access parts of your medical record, email your doctor's office, and request medication refills online. 1. In your internet browser, go to [email or web address] 2. Click on the First Time User? Click Here link in the Sign In box. You will see the New Member Sign Up page. 3. Enter your Vusion Access Code exactly as it appears below. You will not need to use this code after youve completed the sign-up process. If you do not sign up before the expiration date, you must request a new code. · [employer]usion Access Code: [email or web address] Expires: 11/17/2017 11:41 PM 
 
4. Enter the last four digits of your Social Security Number (xxxx) and Date of Birth (mm/dd/yyyy) as indicated and click Submit. You will be taken to the next sign-up page. 5. Create a Vericantt ID. This will be your Vusion login ID and cannot be changed, so think of one that is secure and easy to remember. 6. Create a Vericantt password. You can change your password at any time. 7. Enter your Password Reset Question and Answer. This can be used at a later time if you forget your password. 8. Enter your e-mail address. You will receive e-mail notification when new information is available in [address] 9. Click Sign Up. You can now view and download portions of your medical record. 10. Click the Download Summary menu link to download a portable copy of your medical information. If you have questions, please visit the Frequently Asked Questions section of the Re[employer] website. Remember, Re[employer] is NOT to be used for urgent needs. For medical emergencies, dial 911. Now available from your iPhone and Android! General Information Please provide this summary of care documentation to your next provider. Patient Signature:  ____________________________________________________________ Date:  ____________________________________________________________  
  
Ava Llanes Provider Signature:  ____________________________________________________________ Date:  ____________________________________________________________ More Information Ondansetron (By mouth, Into the mouth) Ondansetron (on-DAN-se-clyde) Prevents nausea and vomiting. Brand Name(s): Zofran, Zofran ODT, Lui Pool There may be other brand names for this medicine. When This Medicine Should Not Be Used: This medicine is not right for everyone. Do not use it if you had an allergic reaction to ondansetron. How to Use This Medicine: Thin Sheet, Liquid, Tablet, Dissolving Tablet · Your doctor will tell you how much medicine to use. Do not use more than directed. · Measure the oral liquid medicine with a marked measuring spoon, oral syringe, or medicine cup. · To use the disintegrating tablet:  
¨ Do not open the blister pack that contains the tablet until you are ready to take it. ¨ Make sure your hands are dry.  Peel back the foil, then remove the tablet from the blister pack. Do not push the tablet through the foil. ¨ Place the tablet on top of your tongue where it will dissolve in seconds. After the tablet has melted, swallow or take a sip of water. · To use the soluble film: ¨ Make sure your hands are clean and dry. ¨ Fold the pouch along the dotted line. ¨ While still folded, tear the pouch carefully along the edge. Remove the film from the pouch. ¨ Place the film on top of your tongue. It will dissolve in 4 to 20 seconds. Do not chew or swallow the film whole. ¨ After the film has dissolved, you may swallow with or without water. · Read and follow the patient instructions that come with this medicine. Talk to your doctor or pharmacist if you have any questions. · Missed dose: Take a dose as soon as you remember. If it is almost time for your next dose, wait until then and take a regular dose. Do not take extra medicine to make up for a missed dose. · Store the medicine in a closed container at room temperature, away from heat, moisture, and direct light. Keep the soluble film in the foil pouch until you ready to use it. Drugs and Foods to Avoid: Ask your doctor or pharmacist before using any other medicine, including over-the-counter medicines, vitamins, and herbal products. · Do not use this medicine together with apomorphine. · Some medicines may affect how ondansetron works. Tell your doctor if you are using tramadol, diuretics (water pills), or any other medicine for nausea and vomiting. Warnings While Using This Medicine: · Tell your doctor if you are pregnant or breastfeeding, or if you have liver disease, congestive heart failure, heart rhythm problems (such as prolonged QT interval, slow heartbeat), low magnesium or potassium levels, stomach or bowel problems, or phenylketonuria (PKU). · This medicine may cause heart rhythm problems (such as QT prolongation). · This medicine may make you dizzy.  Do not drive or do anything else that could be dangerous until you know how this medicine affects you. · Keep all medicine out of the reach of children. Never share your medicine with anyone. Possible Side Effects While Using This Medicine:  
Call your doctor right away if you notice any of these side effects: · Allergic reaction: Itching or hives, swelling in your face or hands, swelling or tingling in your mouth or throat, chest tightness, trouble breathing · Fainting, dizziness, or lightheadedness · Fast, pounding, or uneven heartbeat · Trouble breathing If you notice these less serious side effects, talk with your doctor: · Constipation or diarrhea 
· Headache · Tiredness or weakness If you notice other side effects that you think are caused by this medicine, tell your doctor. Call your doctor for medical advice about side effects. You may report side effects to FDA at 6-834-FDA-8630 © 2017 [facility] Information is for End User's use only and may not be sold, redistributed or otherwise used for commercial purposes. The above information is an  only. It is not intended as medical advice for individual conditions or treatments. Talk to your doctor, nurse or pharmacist before following any medical regimen to see if it is safe and effective for you.

## 2017-08-30 NOTE — H&P
History and Physical    Subjective:     Carisa Bernstein is a 45 y.o. patient presenting for R ankle ORIF following a fracture. Past Medical History:   Diagnosis Date    Colon polyps     Constipation     Heart murmur     Hemorrhoids     Mitral valve prolapse     Nausea & vomiting     \"VIOLENTLY SICK AFTER TUBAL LIGATION\"    Plantar fasciitis     Vestibular neuritis       Past Surgical History:   Procedure Laterality Date    HX BREAST LUMPECTOMY      HX COLONOSCOPY      2010    HX CYST REMOVAL  2010    right breast    HX GYN      hysterectomy    HX HEENT      tonselectomy    HX HEENT      lasik eye surgery    HX OTHER SURGICAL      cystoscopy    HX OTHER SURGICAL  2010    right ovary removed    HX TUBAL LIGATION       Family History   Problem Relation Age of Onset    Cancer Sister      colon    Cancer Father      colon    Cancer Maternal Grandmother      colon      Social History   Substance Use Topics    Smoking status: Former Smoker    Smokeless tobacco: Never Used    Alcohol use No       Prior to Admission medications    Medication Sig Start Date End Date Taking? Authorizing Provider   oxyCODONE-acetaminophen (PERCOCET) 5-325 mg per tablet Take 1 to 2  tablet every 6 hours as needed for pain control. If you were instructed to try over the counter ibuprofen or tylenol, only take the percocet for pain not controlled with the over the counter medication.  8/19/17  Yes Felipa Meredith MD     Allergies   Allergen Reactions    Latex Other (comments)     \"YEAST INFECTION\"    Amoxicillin Other (comments)     Gave pt bad ha's    Flagyl [Metronidazole] Other (comments)     Pt states headaches and vomiting      Urised [Meth-Hyos-Atrp-M Blue-Ba-Phsal] Itching and Swelling          Objective:     Temp: 98.4 °F (36.9 °C) (08/30/17 1202) Pulse (Heart Rate): 84 (08/30/17 1341) Resp Rate: 15 (08/30/17 1341) BP: 125/82 (08/30/17 1341) O2 Sat (%): 100 % (08/30/17 1341) Weight: 66.7 kg (147 lb) (08/30/17 1202)     RLE snkin c/d/i, swelling appropriate  Intact EHL, FHL, GS, TA motor function  L3-S1 sensation intact. DP palpable    Assessment:     R distal tibia and fibula fracture    Plan: Will proceed with ORIF R distal tibia and fibula as scheduled.     Signed By: Rachana Moreno MD     August 30, 2017

## 2017-08-30 NOTE — ANESTHESIA PROCEDURE NOTES
Peripheral Block    Start time: 8/30/2017 1:30 PM  End time: 8/30/2017 1:45 PM  Performed by: Rachele Lacy  Authorized by: Rachele Lacy       Pre-procedure:    Indications: at surgeon's request, post-op pain management and procedure for pain    Preanesthetic Checklist: patient identified, risks and benefits discussed, site marked, timeout performed, anesthesia consent given and patient being monitored    Timeout Time: 13:29          Block Type:   Block Type:  Saphenous  Laterality:  Right  Monitoring:  Standard ASA monitoring, continuous pulse ox, frequent vital sign checks, heart rate, oxygen and responsive to questions  Injection Technique:  Single shot  Procedures: ultrasound guided    Patient Position: supine  Prep: chlorhexidine    Location:  Lower thigh  Needle Type:  Stimuplex  Needle Gauge:  21 G  Needle Localization:  Ultrasound guidance  Medication Injected:  0.5%  ropivacaine  Volume (mL):  10    Assessment:  Number of attempts:  1  Injection Assessment:  Incremental injection every 5 mL, no paresthesia, ultrasound image on chart, local visualized surrounding nerve on ultrasound, negative aspiration for blood and no intravascular symptoms  Patient tolerance:  Patient tolerated the procedure well with no immediate complications  Pt was sedated with 2 mg Midazolam and 100 mcg Fentanyl

## 2017-08-31 VITALS
SYSTOLIC BLOOD PRESSURE: 101 MMHG | HEIGHT: 66 IN | WEIGHT: 147 LBS | BODY MASS INDEX: 23.63 KG/M2 | HEART RATE: 99 BPM | TEMPERATURE: 98.5 F | RESPIRATION RATE: 16 BRPM | DIASTOLIC BLOOD PRESSURE: 58 MMHG | OXYGEN SATURATION: 96 %

## 2017-08-31 PROCEDURE — 74011000258 HC RX REV CODE- 258: Performed by: ORTHOPAEDIC SURGERY

## 2017-08-31 PROCEDURE — 97116 GAIT TRAINING THERAPY: CPT

## 2017-08-31 PROCEDURE — 74011000250 HC RX REV CODE- 250: Performed by: ORTHOPAEDIC SURGERY

## 2017-08-31 PROCEDURE — 74011250636 HC RX REV CODE- 250/636: Performed by: ORTHOPAEDIC SURGERY

## 2017-08-31 PROCEDURE — 74011250637 HC RX REV CODE- 250/637: Performed by: ORTHOPAEDIC SURGERY

## 2017-08-31 PROCEDURE — 97162 PT EVAL MOD COMPLEX 30 MIN: CPT

## 2017-08-31 PROCEDURE — 99218 HC RM OBSERVATION: CPT

## 2017-08-31 RX ORDER — OXYCODONE AND ACETAMINOPHEN 5; 325 MG/1; MG/1
TABLET ORAL
Qty: 40 TAB | Refills: 0 | Status: SHIPPED | OUTPATIENT
Start: 2017-08-31 | End: 2018-04-17

## 2017-08-31 RX ORDER — ONDANSETRON 4 MG/1
4-8 TABLET, ORALLY DISINTEGRATING ORAL
Qty: 15 TAB | Refills: 0 | Status: SHIPPED | OUTPATIENT
Start: 2017-08-31 | End: 2018-04-17

## 2017-08-31 RX ORDER — ACETAMINOPHEN 500 MG
500 TABLET ORAL
Status: DISCONTINUED | OUTPATIENT
Start: 2017-08-31 | End: 2017-08-31 | Stop reason: HOSPADM

## 2017-08-31 RX ORDER — SODIUM CHLORIDE 9 MG/ML
500 INJECTION, SOLUTION INTRAVENOUS ONCE
Status: COMPLETED | OUTPATIENT
Start: 2017-08-31 | End: 2017-08-31

## 2017-08-31 RX ADMIN — SODIUM CHLORIDE 600 MG: 900 INJECTION, SOLUTION INTRAVENOUS at 11:14

## 2017-08-31 RX ADMIN — OXYCODONE HYDROCHLORIDE AND ACETAMINOPHEN 2 TABLET: 5; 325 TABLET ORAL at 01:31

## 2017-08-31 RX ADMIN — SODIUM CHLORIDE 500 ML: 900 INJECTION, SOLUTION INTRAVENOUS at 09:00

## 2017-08-31 NOTE — PROGRESS NOTES
Broken Lower Leg: Care Instructions  Your Care Instructions    Treatment for your broken leg will depend on how bad the break is. Your doctor may have put your lower leg in a splint or a cast to allow it to heal or keep it stable until you see another doctor. It may take weeks or months for your leg to heal. You can help it heal with some care at home. You heal best when you take good care of yourself. Eat a variety of healthy foods, and don't smoke. Follow-up care is a key part of your treatment and safety. Be sure to make and go to all appointments, and call your doctor if you are having problems. It's also a good idea to know your test results and keep a list of the medicines you take. How can you care for yourself at home? · Put ice or a cold pack on your lower leg for 10 to 20 minutes at a time. Try to do this every 1 to 2 hours for the next 3 days (when you are awake). Put a thin cloth between the ice and your cast or splint. Keep your cast or splint dry. · Follow the cast care instructions your doctor gives you. If you have a splint, do not take it off unless your doctor tells you to. · Be safe with medicines. Take pain medicines exactly as directed. ¨ If the doctor gave you a prescription medicine for pain, take it as prescribed. ¨ If you are not taking a prescription pain medicine, ask your doctor if you can take an over-the-counter medicine. · Do not put weight on your leg unless your doctor tells you to. Use crutches to walk. · Prop up your leg on pillows when you sit or lie down in the first few days after the injury. Keep your leg higher than the level of your heart. This will help reduce swelling. · Follow instructions for exercises to keep your leg strong. · Wiggle your toes often to reduce swelling and stiffness. When should you call for help? Call 911 anytime you think you may need emergency care.  For example, call if:  · You have sudden chest pain and shortness of breath, or you cough up blood. Call your doctor now or seek immediate medical care if:  · You have increased or severe pain. · Your foot is cool or pale or changes color. · You have tingling, weakness, or numbness in your toes. · Your cast or splint feels too tight. · You cannot move your toes. · You have signs of a blood clot, such as:  ¨ Pain in your calf, back of the knee, thigh, or groin. ¨ Redness and swelling in your leg or groin. · The skin under your cast or splint is burning or stinging. Watch closely for changes in your health, and be sure to contact your doctor if:  · You do not get better as expected. Where can you learn more? Go to http://angela-michelle.info/. Enter L198 in the search box to learn more about \"Broken Lower Leg: Care Instructions. \"  Current as of: March 21, 2017  Content Version: 11.3  © 7913-5929 [employer]. Care instructions adapted under license by [employer] (which disclaims liability or warranty for this information). If you have questions about a medical condition or this instruction, always ask your healthcare professional. [place] [address] any warranty or liability for your use of this information. Patient armband removed and shredded  MyChart Activation    Thank you for requesting access to [facility]. Please follow the instructions below to securely access and download your online medical record. [facility] allows you to send messages to your doctor, view your test results, renew your prescriptions, schedule appointments, and more. How Do I Sign Up? 1. In your internet browser, go to [email or web address]  2. Click on the First Time User? Click Here link in the Sign In box. You will be redirect to the New Member Sign Up page. 3. Enter your [facility] Access Code exactly as it appears below. You will not need to use this code after youve completed the sign-up process.  If you do not sign up before the expiration date, you must request a new code. [facility] Access Code: [email or web address]  Expires: 2017 11:41 PM (This is the date your [facility] [employer] access code will )    4. Enter the last four digits of your Social Security Number (xxxx) and Date of Birth (mm/dd/yyyy) as indicated and click Submit. You will be taken to the next sign-up page. 5. Create a [facility] ID. This will be your [employer] login ID and cannot be changed, so think of one that is secure and easy to remember. 6. Create a [facility] password. You can change your password at any time. 7. Enter your Password Reset Question and Answer. This can be used at a later time if you forget your password. 8. Enter your e-mail address. You will receive e-mail notification when new information is available in [address] 9. Click Sign Up. You can now view and download portions of your medical record. 10. Click the Download Summary menu link to download a portable copy of your medical information. Additional Information    If you have questions, please visit the Frequently Asked Questions section of the [employer] website at [email or web address] Remember, [employer] [facility] is NOT to be used for urgent needs. For medical emergencies, dial 911. DISCHARGE SUMMARY from Nurse    The following personal items are in your possession at time of discharge:    Dental Appliances: None  Visual Aid: None        Jewelry: None  Clothing: Pants, Shirt, Undergarments, Footwear  Other Valuables: None             PATIENT INSTRUCTIONS:    After general anesthesia or intravenous sedation, for 24 hours or while taking prescription Narcotics:  · Limit your activities  · Do not drive and operate hazardous machinery  · Do not make important personal or business decisions  · Do  not drink alcoholic beverages  · If you have not urinated within 8 hours after discharge, please contact your surgeon on call.     Report the following to your surgeon:  · Excessive pain, swelling, redness or odor of or around the surgical area  · Temperature over 100.5  · Nausea and vomiting lasting longer than 4 hours or if unable to take medications  · Any signs of decreased circulation or nerve impairment to extremity: change in color, persistent  numbness, tingling, coldness or increase pain  · Any questions        What to do at Home:  Recommended activity: Activity as tolerated    If you experience any of the following symptoms uncontrolled pain, fever greater than 100.5, nausea, vomiting, diarrhea, constipation, chest pains, short of breath please follow up with PCP. *  Please give a list of your current medications to your Primary Care Provider. *  Please update this list whenever your medications are discontinued, doses are      changed, or new medications (including over-the-counter products) are added. *  Please carry medication information at all times in case of emergency situations. These are general instructions for a healthy lifestyle:    No smoking/ No tobacco products/ Avoid exposure to second hand smoke    Surgeon General's Warning:  Quitting smoking now greatly reduces serious risk to your health. Obesity, smoking, and sedentary lifestyle greatly increases your risk for illness    A healthy diet, regular physical exercise & weight monitoring are important for maintaining a healthy lifestyle    You may be retaining fluid if you have a history of heart failure or if you experience any of the following symptoms:  Weight gain of 3 pounds or more overnight or 5 pounds in a week, increased swelling in our hands or feet or shortness of breath while lying flat in bed. Please call your doctor as soon as you notice any of these symptoms; do not wait until your next office visit.     Recognize signs and symptoms of STROKE:    F-face looks uneven    A-arms unable to move or move unevenly    S-speech slurred or non-existent    T-time-call 911 as soon as signs and symptoms begin-DO NOT go       Back to bed or wait to see if you get better-TIME IS BRAIN. Warning Signs of HEART ATTACK     Call 911 if you have these symptoms:   Chest discomfort. Most heart attacks involve discomfort in the center of the chest that lasts more than a few minutes, or that goes away and comes back. It can feel like uncomfortable pressure, squeezing, fullness, or pain.  Discomfort in other areas of the upper body. Symptoms can include pain or discomfort in one or both arms, the back, neck, jaw, or stomach.  Shortness of breath with or without chest discomfort.  Other signs may include breaking out in a cold sweat, nausea, or lightheadedness. Don't wait more than five minutes to call 911 - MINUTES MATTER! Fast action can save your life. Calling 911 is almost always the fastest way to get lifesaving treatment. Emergency Medical Services staff can begin treatment when they arrive -- up to an hour sooner than if someone gets to the hospital by car. The discharge information has been reviewed with the patient. The patient verbalized understanding. Discharge medications reviewed with the patient and appropriate educational materials and side effects teaching were provided.

## 2017-08-31 NOTE — PROGRESS NOTES
Problem: Mobility Impaired (Adult and Pediatric)  Goal: *Acute Goals and Plan of Care (Insert Text)  STGs to be addressed within 3 days:  1. Bed mobility: Supine to sit to supine S with HR for meals. 2. Activity tolerance: Tolerate up in chair 1-2 hrs for ADLs. 3. Transfers: Sit to stand to chair S with LRAD for ADLs. LTGs to be addressed within 7 days:  1. Standing/Ambulation Balance: Increase to Good with LRAD for safe transfers and gait. 2. Ambulation: Ambulate > 200 ft. S with LRAD for home mobility. 3. Patient Education: Independent with HEP for home safety. 4. Stairs: Up/Down 4 steps CGA with HR for home entry. Outcome: Progressing Towards Goal  PHYSICAL THERAPY EVALUATION     Patient: Ashlee Kerr (26 y.o. female)  Date: 8/31/2017  Primary Diagnosis: S82.241A, S82.441A   Fracture of distal end of tibia with fibula  Procedure(s) (LRB):  OPEN REDUCTION INTERNAL FIXATION RIGHT TIBIA AND FIBULA/C-ARM/SYNTHES 3.5 DISTAL FIBULAR LOCK PLATES (Right) 1 Day Post-Op   Precautions: Fall, NWB (R LE)      ASSESSMENT :  Based on the objective data described below, the patient presents with impaired functional mobility including bed mobility, transfers, ambulation, and general activity tolerance following R ankle ORIF. Patient is NWB through L foot, has knee scooter at home which was using prior to surgery and has been recommended to use by MD as she did not feel comfortable using axillary crutches. Patient verbally trained in proper use of knee scooter on R LE with verbal understanding noted. Patient transferred to sitting EOB with SBA, requires additional time for management of R LE. Patient transferred to standing using RW, ambulated ~50 ft in room using RW and hopping pattern to maintain NWB on R LE. Patient left seated in locked recliner with LE elevated, pillow under R LE, and call bell in reach.  She states she lives alone, has family coming to stay with her, but they will not be available until tomorrow. Patient will benefit from skilled intervention to address the above impairments. Patients rehabilitation potential is considered to be Good  Factors which may influence rehabilitation potential include:   [ ]         None noted  [ ]         Mental ability/status  [X]         Medical condition  [X]         Home/family situation and support systems  [ ]         Safety awareness  [ ]         Pain tolerance/management  [ ]         Other:        PLAN :  Recommendations and Planned Interventions:  [X]           Bed Mobility Training             [X]    Neuromuscular Re-Education  [X]           Transfer Training                   [ ]    Orthotic/Prosthetic Training  [X]           Gait Training                          [ ]    Modalities  [X]           Therapeutic Exercises          [ ]    Edema Management/Control  [X]           Therapeutic Activities            [X]    Patient and Family Training/Education  [ ]           Other (comment):     Frequency/Duration: Patient will be followed by physical therapy 1-2 times per day to address goals. Discharge Recommendations: Home Health  Further Equipment Recommendations for Discharge: rolling walker       SUBJECTIVE:   Patient stated I'm a hard worker.       OBJECTIVE DATA SUMMARY:       Past Medical History:   Diagnosis Date    Colon polyps      Constipation      Heart murmur      Hemorrhoids      Mitral valve prolapse      Nausea & vomiting       \"VIOLENTLY SICK AFTER TUBAL LIGATION\"    Plantar fasciitis      Vestibular neuritis       Past Surgical History:   Procedure Laterality Date    HX BREAST LUMPECTOMY        HX COLONOSCOPY         2010    HX CYST REMOVAL   2010     right breast    HX GYN         hysterectomy    HX HEENT         tonselectomy    HX HEENT         lasik eye surgery    HX OTHER SURGICAL         cystoscopy    HX OTHER SURGICAL   2010     right ovary removed    HX TUBAL LIGATION         Barriers to Learning/Limitations: None  Compensate with: N/A  Prior Level of Function/Home Situation: Patient reports using knee scooter prior to surgery, was independent with ADLs. Home Situation  Home Environment: Private residence  # Steps to Enter: 4  Rails to Enter: Yes  Hand Rails : Bilateral  One/Two Story Residence: One story  Living Alone: Yes  Support Systems: Child(jessica), Family member(s)  Patient Expects to be Discharged to[de-identified] Private residence  Current DME Used/Available at Home: Crutches (Knee scooter)  Critical Behavior:  Neurologic State: Alert  Psychosocial  Patient Behaviors: Calm; Cooperative  Purposeful Interaction: Yes  Pt Identified Daily Priority: Clinical issues (comment)  Caritas Process: Establish trust;Enable silviano/hope  Caring Interventions: Reassure  Reassure: Therapeutic listening; Informing  Strength:    Strength: Generally decreased, functional (B LE)  Tone & Sensation:   Tone: Normal  Sensation: Intact (B LE to LT)   Range Of Motion:  AROM: Within functional limits (B LE with exception of R ankle)  Functional Mobility:  Bed Mobility:  Rolling: Stand-by asssistance  Supine to Sit: Stand-by asssistance  Scooting: Stand-by asssistance  Transfers:  Sit to Stand: Contact guard assistance  Stand to Sit: Contact guard assistance  Balance:   Sitting: Intact; Without support  Standing: Impaired; With support  Standing - Static: Good (RW)  Standing - Dynamic : Fair (Fair +)  Ambulation/Gait Training:  Distance (ft): 50 Feet (ft)  Assistive Device: Walker, rolling  Ambulation - Level of Assistance: Stand-by asssistance  Speed/Tosin: Pace decreased (<100 feet/min)  Pain:   No c/o pain throughout evaluation  Activity Tolerance:   Good  Please refer to the flowsheet for vital signs taken during this treatment.   After treatment:   [X] Patient left in no apparent distress sitting up in chair  [ ] Patient left sitting on EOB  [ ] Patient left in no apparent distress in bed  [ ] Patient declined to be OOB at this time due to  [X] Call gomez left within reach  [X] Nursing notified Jeffery Can RN)  [ ] Caregiver present  [ ] Bed alarm activated      COMMUNICATION/EDUCATION:   [X]         Fall prevention education was provided and the patient/caregiver indicated understanding. [X]         Patient/family have participated as able in goal setting and plan of care. [X]         Patient/family agree to work toward stated goals and plan of care. [ ]         Patient understands intent and goals of therapy, but is neutral about his/her participation. [ ]         Patient is unable to participate in goal setting and plan of care. Thank you for this referral.  Aly Sanders   Time Calculation: 23 mins      Mobility  Current  CI= 1-19%   Goal  CI= 1-19%. The severity rating is based on the Level of Assistance required for Functional Mobility and ADLs.      Eval Complexity: History: MEDIUM  Complexity : 1-2 comorbidities / personal factors will impact the outcome/ POC Exam:MEDIUM Complexity : 3 Standardized tests and measures addressing body structure, function, activity limitation and / or participation in recreation  Presentation: MEDIUM Complexity : Evolving with changing characteristics Overall Complexity:MEDIUM

## 2017-08-31 NOTE — DISCHARGE INSTRUCTIONS
Broken Lower Leg: Care Instructions  Your Care Instructions    Treatment for your broken leg will depend on how bad the break is. Your doctor may have put your lower leg in a splint or a cast to allow it to heal or keep it stable until you see another doctor. It may take weeks or months for your leg to heal. You can help it heal with some care at home. You heal best when you take good care of yourself. Eat a variety of healthy foods, and don't smoke. Follow-up care is a key part of your treatment and safety. Be sure to make and go to all appointments, and call your doctor if you are having problems. It's also a good idea to know your test results and keep a list of the medicines you take. How can you care for yourself at home? · Put ice or a cold pack on your lower leg for 10 to 20 minutes at a time. Try to do this every 1 to 2 hours for the next 3 days (when you are awake). Put a thin cloth between the ice and your cast or splint. Keep your cast or splint dry. · Follow the cast care instructions your doctor gives you. If you have a splint, do not take it off unless your doctor tells you to. · Be safe with medicines. Take pain medicines exactly as directed. ¨ If the doctor gave you a prescription medicine for pain, take it as prescribed. ¨ If you are not taking a prescription pain medicine, ask your doctor if you can take an over-the-counter medicine. · Do not put weight on your leg unless your doctor tells you to. Use crutches to walk. · Prop up your leg on pillows when you sit or lie down in the first few days after the injury. Keep your leg higher than the level of your heart. This will help reduce swelling. · Follow instructions for exercises to keep your leg strong. · Wiggle your toes often to reduce swelling and stiffness. When should you call for help? Call 911 anytime you think you may need emergency care.  For example, call if:  · You have sudden chest pain and shortness of breath, or you cough up blood. Call your doctor now or seek immediate medical care if:  · You have increased or severe pain. · Your foot is cool or pale or changes color. · You have tingling, weakness, or numbness in your toes. · Your cast or splint feels too tight. · You cannot move your toes. · You have signs of a blood clot, such as:  ¨ Pain in your calf, back of the knee, thigh, or groin. ¨ Redness and swelling in your leg or groin. · The skin under your cast or splint is burning or stinging. Watch closely for changes in your health, and be sure to contact your doctor if:  · You do not get better as expected. Where can you learn more? Go to http://angela-michelle.info/. Enter L198 in the search box to learn more about \"Broken Lower Leg: Care Instructions. \"  Current as of: March 21, 2017  Content Version: 11.3  © 5284-7569 [employer]. Care instructions adapted under license by [employer] (which disclaims liability or warranty for this information). If you have questions about a medical condition or this instruction, always ask your healthcare professional. [place] [address] any warranty or liability for your use of this information. Patient armband removed and shredded  MyChart Activation    Thank you for requesting access to [employer]. Please follow the instructions below to securely access and download your online medical record. [employer] allows you to send messages to your doctor, view your test results, renew your prescriptions, schedule appointments, and more. How Do I Sign Up? 1. In your internet browser, go to [email or web address]  2. Click on the First Time User? Click Here link in the Sign In box. You will be redirect to the New Member Sign Up page. 3. Enter your [employer] Access Code exactly as it appears below. You will not need to use this code after youve completed the sign-up process.  If you do not sign up before the expiration date, you must request a new code. [employer] Access Code: [email or web address]  Expires: 2017 11:41 PM (This is the date your [employer] access code will )    4. Enter the last four digits of your Social Security Number (xxxx) and Date of Birth (mm/dd/yyyy) as indicated and click Submit. You will be taken to the next sign-up page. 5. Create a [employer] ID. This will be your [employer] login ID and cannot be changed, so think of one that is secure and easy to remember. 6. Create a [employer] password. You can change your password at any time. 7. Enter your Password Reset Question and Answer. This can be used at a later time if you forget your password. 8. Enter your e-mail address. You will receive e-mail notification when new information is available in [address] 9. Click Sign Up. You can now view and download portions of your medical record. 10. Click the Download Summary menu link to download a portable copy of your medical information. Additional Information    If you have questions, please visit the Frequently Asked Questions section of the [employer] website at [email or web address] Remember, [employer] is NOT to be used for urgent needs. For medical emergencies, dial 911. DISCHARGE SUMMARY from Nurse    The following personal items are in your possession at time of discharge:    Dental Appliances: None  Visual Aid: None        Jewelry: None  Clothing: Pants, Shirt, Undergarments, Footwear  Other Valuables: None             PATIENT INSTRUCTIONS:    After general anesthesia or intravenous sedation, for 24 hours or while taking prescription Narcotics:  · Limit your activities  · Do not drive and operate hazardous machinery  · Do not make important personal or business decisions  · Do  not drink alcoholic beverages  · If you have not urinated within 8 hours after discharge, please contact your surgeon on call.     Report the following to your surgeon:  · Excessive pain, swelling, redness or odor of or around the surgical area  · Temperature over 100.5  · Nausea and vomiting lasting longer than 4 hours or if unable to take medications  · Any signs of decreased circulation or nerve impairment to extremity: change in color, persistent  numbness, tingling, coldness or increase pain  · Any questions        What to do at Home:  Recommended activity: Activity as tolerated    If you experience any of the following symptoms uncontrolled pain, fever greater than 100.5, nausea, vomiting, diarrhea, constipation, chest pains, short of breath please follow up with PCP. *  Please give a list of your current medications to your Primary Care Provider. *  Please update this list whenever your medications are discontinued, doses are      changed, or new medications (including over-the-counter products) are added. *  Please carry medication information at all times in case of emergency situations. These are general instructions for a healthy lifestyle:    No smoking/ No tobacco products/ Avoid exposure to second hand smoke    Surgeon General's Warning:  Quitting smoking now greatly reduces serious risk to your health. Obesity, smoking, and sedentary lifestyle greatly increases your risk for illness    A healthy diet, regular physical exercise & weight monitoring are important for maintaining a healthy lifestyle    You may be retaining fluid if you have a history of heart failure or if you experience any of the following symptoms:  Weight gain of 3 pounds or more overnight or 5 pounds in a week, increased swelling in our hands or feet or shortness of breath while lying flat in bed. Please call your doctor as soon as you notice any of these symptoms; do not wait until your next office visit.     Recognize signs and symptoms of STROKE:    F-face looks uneven    A-arms unable to move or move unevenly    S-speech slurred or non-existent    T-time-call 911 as soon as signs and symptoms begin-DO NOT go Back to bed or wait to see if you get better-TIME IS BRAIN. Warning Signs of HEART ATTACK     Call 911 if you have these symptoms:   Chest discomfort. Most heart attacks involve discomfort in the center of the chest that lasts more than a few minutes, or that goes away and comes back. It can feel like uncomfortable pressure, squeezing, fullness, or pain.  Discomfort in other areas of the upper body. Symptoms can include pain or discomfort in one or both arms, the back, neck, jaw, or stomach.  Shortness of breath with or without chest discomfort.  Other signs may include breaking out in a cold sweat, nausea, or lightheadedness. Don't wait more than five minutes to call 911 - MINUTES MATTER! Fast action can save your life. Calling 911 is almost always the fastest way to get lifesaving treatment. Emergency Medical Services staff can begin treatment when they arrive -- up to an hour sooner than if someone gets to the hospital by car. The discharge information has been reviewed with the patient. The patient verbalized understanding. Discharge medications reviewed with the patient and appropriate educational materials and side effects teaching were provided.

## 2017-08-31 NOTE — PROGRESS NOTES
[facility] progress note    POD# 1 Following  ORIF R distal tibia and fibula fracture    S: Reports no pain this AM.      O:    Temp: 98.1 °F (36.7 °C) (08/31/17 0718) Pulse (Heart Rate): 92 (08/30/17 1935) Resp Rate: 16 (08/31/17 0718) BP: (!) 83/47 (08/31/17 0718) O2 Sat (%): 99 % (08/31/17 0718) Weight: 66.7 kg (147 lb) (08/30/17 1202)       Short leg splint C/D/I  Intact EHL, FHL, GS, TA motor function  L3-S1 sensation intact  DP pulse palpable      O:  -PT/OT  -fluid bolus this AM  -lugo out  -check H&H  -Weight bearing status: non weight bearing  -antibiotics: clindamycin x 23 hours  -Diet: regular  -DVT prophylaxis: SCD's  -Pain control: PO percocet  -Dispo: likely home today or tomorrow

## 2017-08-31 NOTE — INTERDISCIPLINARY ROUNDS
Interdisciplinary Round Note   Patient Information: Patient Information: Brooklyn Benedict                                      684/68   Reason for Admission: S82.241A, S82.441A   Fracture of distal end of tibia with fibula   Attending Provider:   Anthoney Dakins, MD  Primary Care Physician:       Lamar Richards MD       [phone]   Past Medical History:   Past Medical History:   Diagnosis Date    Colon polyps     Constipation     Heart murmur     Hemorrhoids     Mitral valve prolapse     Nausea & vomiting     \"VIOLENTLY SICK AFTER TUBAL LIGATION\"    Plantar fasciitis     Vestibular neuritis       Hospital day: 1  Estimated discharge date:   RRAT Score: Low Risk            0       Total Score            Criteria that do not apply:    Has Seen PCP in Last 6 Months (Yes=3, No=0)    . Living with Significant Other. Assisted Living. LTAC. SNF. or   Rehab    Patient Length of Stay (>5 days = 3)    IP Visits Last 12 Months (1-3=4, 4=9, >4=11)    Pt. Coverage (Medicare=5 , Medicaid, or Self-Pay=4)    Charlson Comorbidity Score (Age + Comorbid Conditions)       Goals for Today: pain control safety      Overnight Events: low bp     VITAL SIGNS  Vitals:    08/30/17 2048 08/31/17 0038 08/31/17 0506 08/31/17 0718   BP: 126/82 108/67 91/50 (!) 83/47   Pulse:       Resp: 16 16 16 16   Temp: 98.5 °F (36.9 °C) 97.6 °F (36.4 °C) 99.7 °F (37.6 °C) 98.1 °F (36.7 °C)   SpO2: 99% 100% 99% 99%   Weight:       Height:              Lines, Drains, & Airways            VTE Prophylaxis               Sequential Compression Device: Left lower extremity                   Intake and Output:   08/29 1901 - 08/31 0700  In: 1450 [I.V.:1450]  Out: 2100 [Urine:1900]    Current Diet: DIET REGULAR       Abdominal   Last Bowel Movement Date: 08/29/17  GI Prophylaxis: yes        Type: zofran        Recent Glucose Results: No results found for: GLU, GLUPOC, GLUCPOC       IV Antibiotics?  yes       When started: yesterday   Activity Level: Activity Level: Bed Rest, Bath Room Privileges  Needs assistance with ADLs: yes  PT Consult Status: pt/ot Current Immunizations: There is no immunization history on file for this patient.        Recommendations:   Discharge Disposition: Home Independent    Needs for Discharge: n/a Recommendations from IDR team: n/a    Other Notes: n/a

## 2017-08-31 NOTE — PROGRESS NOTES
Care Management Interventions  PCP Verified by CM: Yes  Mode of Transport at Discharge:  (family)  Transition of Care Consult (CM Consult): Discharge Planning  Physical Therapy Consult: Yes  Occupational Therapy Consult: No  Current Support Network: Lives Alone  Confirm Follow Up Transport: Family  Plan discussed with Pt/Family/Caregiver: Yes  Discharge Location  Discharge Placement: Home with family assistance    Pt is a 45year old admitted for fracture of distal end of tibia with fibula. Pt is alert and oriented and alone in room. Pt reports that she lives alone except that her son comes home from college each weekend. She reports that another son from [facility] will be coming to stay with her for a couple days. Pt lists her daughter Winifred Petty [phone] as her emergency contact. Pt reports that prior to admission she was independent with her ADLs but that she did not shower and just washed up in the bathroom. Pt reports that she has a knee scooter and crutches at home. Pt reports that she has transportation home with family on discharge. Pt declines home health at this time. OBS form signed. Original placed in chart and copy given to pt.

## 2017-08-31 NOTE — OP NOTES
[facility] REPORTS    Name:  Doris Azul  MR#:  [id number]  :  1965  Account #:  [de-identified]  Date of Adm:  2017  Date of Surgery:  2017      PREOPERATIVE DIAGNOSES  1. Right distal tibia comminuted intra-articular distal pilon fracture. 2. Comminuted extraarticular distal fibula fracture. POSTOPERATIVE DIAGNOSES  1. Right distal tibia comminuted intra-articular distal pilon fracture. 2. Comminuted extraarticular distal fibula fracture. PROCEDURES PERFORMED  1. Open reduction internal fixation distal fibula intra-articular fracture. 2. Open reduction internal fixation comminuted intra-articular distal  tibial fracture. ANESTHESIA  1. General.  2. Block. SURGEON: Iris Ulrich MD    ASSISTANT: Heather Johnson. ESTIMATED BLOOD LOSS: 50 mL. SPECIMENS REMOVED: none    ANTIBIOTICS: Clindamycin 600 mL. TOURNIQUET TIME: 2 hours at 300 mmHg. IMPLANTS  1. Synthes 3.5 mm anterior lateral distal tibial locking plate. 2. Synthes 2.7 mm distal fibular locking plate. PROCEDURE AND FINDINGS: The patient was identified in the  preoperative holding area. The right leg was marked with indelible  marker, I reviewed the informed consent, block was placed. She was  transported to the operative theater. SCDs were placed. Antibiotics  were administered. Anesthesia was induced. A bump was placed  under the right hip. A Barrientos catheter was placed. A nonsterile  tourniquet was placed on the right leg. The right leg was prepped and  draped in the usual sterile fashion. Robust time-out was performed. The extremity exsanguinated and the tourniquet inflated. A lateral approach to the right distal fibula was performed. Skin and  subcutaneous tissues dissected. The superficial peroneal nerve was  protected at all times. The distal fibula was exposed. There was found  to be a comminuted Mason B distal fibula fracture. A provisional  reduction was achieved. This was then secured with an  interfragmentary screw and a lateral locking plate was placed and  appropriate reduction in AP and lateral images. Attention was then turned to the distal tibia. An anterolateral approach  to the distal tibia was performed. Skin and subcutaneous tissues were  dissected. The interval between the extensor digitorum communis and  the tibialis anterior was opened and exploited. The neurovascular  bundle and superficial peroneal nerve were identified and protected at  all times. A subperiosteal dissection of the anterior distal tibia was  performed. This was extended distally to the ankle joint. There was  found to be a highly comminuted 4-part distal tibial intra-articular  fracture. There was 2 primary flat fracture lines anteriorly and  posteriorly, a secondary anterior medial delta shape fracture fragment  and a 4th anterolateral Chopart fragment. The wound was thoroughly  irrigated and the fractures were meticulously assessed. Provisional  reduction was achieved with reduction forceps and Bindu wires. Fluoroscopic imaging was utilized to confirm appropriate reduction and  appropriate adjustments were made. The smaller distal tibial fractures  were initially stabilized with Bindu wires and then three 2.7 mm  screws. An anterior lateral locking plate was then placed against the  distal tibia and found to be the appropriate height. Proximal nonlocking  fixation was initially achieved, followed by distal locking fixation. We  were able to obtain an anatomic reduction of the tibial plafond, both on  direct visualization as well as under fluoroscopic imaging. At one point,  when placing the screws we did cause loss of reduction of the  anterolateral fragment, this was identified and the screws were  readjusted and the reduction was maintained. Attention was then returned to the distal fibula.  During manipulation of  the distal tibia, it was identified her distal fibular reduction had  changed. The distal fibular locking plate was removed and the  reduction was reobtained. It was noted the patient had osteoporotic  bone. Locking fixation was utilized this time, which was able to  maintain our distal fibula in anatomic alignment. Following final fixation,  the wounds were thoroughly irrigated and the tourniquet was deflated. Under fluoroscopic imaging, the ankle was externally rotated and the  ankle syndesmosis was found to be stable, so no syndesmotic fixation  was required. Wound closure was performed. After thorough irrigation of all wounds  and hemostasis was obtained, a layered closure was performed with  staples placed on the skin. Sterile dressings were applied. The leg was  immobilized in a well-padded posterior splint. The patient was  awakened from anesthesia and transported to post-anesthesia care  unit in stable condition. All sponge and instrument counts were correct  at the end of procedure.         MD SHAKIRA Hernandez / RA  D:  08/30/2017   19:35  T:  08/31/2017   01:00  Job #:  [id number]

## 2017-08-31 NOTE — ANESTHESIA POSTPROCEDURE EVALUATION
Post-Anesthesia Evaluation & Assessment    Visit Vitals    BP (!) 83/47 (BP 1 Location: Right arm, BP Patient Position: At rest)    Pulse 92    Temp 36.7 °C (98.1 °F)    Resp 16    Ht 5' 6\" (1.676 m)    Wt 66.7 kg (147 lb)    SpO2 99%    BMI 23.73 kg/m2       Post-operative hydration adequate. Pain score (VAS): 0 Pain Scale 1: Visual (08/30/17 1915)  Pain Intensity 1: 0 (08/30/17 1915)   Managed. Mental status & Level of consciousness: returned to baseline    Neurological status: returned to baseline    Pulmonary status: airway patent, oxygen given as needed. Complications related to anesthesia: none    Patient has met all discharge requirements.     Additional comments:        Iveth Mclaughlin MD  August 31, 2017

## 2018-01-16 ENCOUNTER — OFFICE VISIT (OUTPATIENT)
Dept: ORTHOPEDIC SURGERY | Age: 53
End: 2018-01-16

## 2018-01-16 VITALS
SYSTOLIC BLOOD PRESSURE: 122 MMHG | HEART RATE: 75 BPM | DIASTOLIC BLOOD PRESSURE: 65 MMHG | OXYGEN SATURATION: 100 % | RESPIRATION RATE: 14 BRPM | HEIGHT: 66 IN | WEIGHT: 156 LBS | BODY MASS INDEX: 25.07 KG/M2

## 2018-01-16 DIAGNOSIS — S82.401A CLOSED FRACTURE OF RIGHT TIBIA AND FIBULA, INITIAL ENCOUNTER: ICD-10-CM

## 2018-01-16 DIAGNOSIS — S82.201A CLOSED FRACTURE OF RIGHT TIBIA AND FIBULA, INITIAL ENCOUNTER: ICD-10-CM

## 2018-01-16 DIAGNOSIS — M25.571 ACUTE RIGHT ANKLE PAIN: Primary | ICD-10-CM

## 2018-01-16 RX ORDER — ALENDRONATE SODIUM 70 MG/1
TABLET ORAL
COMMUNITY
End: 2018-09-13

## 2018-01-16 NOTE — LETTER
NOTIFICATION RETURN TO WORK / SCHOOL 
 
1/16/2018 11:30 AM 
 
Ms. Paco Musa 
[address] [facility] 
[address] [facility] [address] To Whom It May Concern: 
 
Paco Musa is currently under the care of [address] [place] [address] Mayito Turner. Lifting: The patient can lift no more than 10 pounds for no more than 2 hours per day and can lift no more than 5 pounds no more than 6 hours per day. Walking/Standing: The patient can walk or stand no more than 1 hour per day. Kneeling/Squatting: The patient cannot kneel or squat. Climbing: The patient can climb no more than 1 flight of stairs at a time. The patient cannot climb ladders. Twisting/Bending: The patient can twist or bend no more than twice per hour. The patient should remain on these restrictions until reevaluated. Patient was seen in my office on 1/16/2018. If there are questions or concerns please have the patient contact our office.  
 
 
 
Sincerely, 
 
 
Fernandez Cunningham MD

## 2018-01-16 NOTE — LETTER
NOTIFICATION RETURN TO WORK / SCHOOL 
 
1/29/2018 11:37 AM 
 
Ms. Daniel Taylor 
[address] 
[address] [facility] [address] To Whom It May Concern: 
 
Daniel Taylor is currently under the care of [address] [place] [address] Mayito Turner. Lifting: The patient can lift no more than 10 pounds for no more than 2 hours per day and can lift no more than 5 pounds no more than 6 hours per day. Walking/Standing: The patient can walk or stand no more than 1 hour per day. Kneeling/Squatting: The patient cannot kneel or squat. Climbing: The patient can climb no more than 1 flight of stairs at a time. The patient cannot climb ladders. Twisting/Bending: The patient can twist or bend no more than twice per hour. The patient should remain on these restrictions until reevaluated. Patient was seen in my office on 1/29/2018. If there are questions or concerns please have the patient contact our office. If there are questions or concerns please have the patient contact our office.  
 
 
 
Sincerely, 
 
 
Kamryn Mccormack MD
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
## 2018-01-16 NOTE — LETTER
NOTIFICATION RETURN TO WORK / SCHOOL 
 
1/16/2018 11:39 AM 
 
Ms. Darío Schroeder 
[address] [address] To Whom It May Concern: 
 
Darío Schroeder is currently under the care of [facility]. Please allow her to return to the gym at her regular rate before the cancellation of her membership occurred. She was unable to go to the gym due to a right ankle injury. If there are questions or concerns please have the patient contact our office.  
 
 
 
Sincerely, 
 
 
Jeffrey Hall MD

## 2018-01-16 NOTE — PATIENT INSTRUCTIONS
Please follow up in 4 weeks. You are advised to contact us if your condition worsens. Foot Pain: Care Instructions  Your Care Instructions  Foot injuries that cause pain and swelling are fairly common. Almost all sports or home repair projects can cause a misstep that ends up as foot pain. Normal wear and tear, especially as you get older, also can cause foot pain. Most minor foot injuries will heal on their own, and home treatment is usually all you need to do. If you have a severe injury, you may need tests and treatment. Follow-up care is a key part of your treatment and safety. Be sure to make and go to all appointments, and call your doctor if you are having problems. It's also a good idea to know your test results and keep a list of the medicines you take. How can you care for yourself at home? · Take pain medicines exactly as directed. ¨ If the doctor gave you a prescription medicine for pain, take it as prescribed. ¨ If you are not taking a prescription pain medicine, ask your doctor if you can take an over-the-counter medicine. · Rest and protect your foot. Take a break from any activity that may cause pain. · Put ice or a cold pack on your foot for 10 to 20 minutes at a time. Put a thin cloth between the ice and your skin. · Prop up the sore foot on a pillow when you ice it or anytime you sit or lie down during the next 3 days. Try to keep it above the level of your heart. This will help reduce swelling. · Your doctor may recommend that you wrap your foot with an elastic bandage. Keep your foot wrapped for as long as your doctor advises. · If your doctor recommends crutches, use them as directed. · Wear roomy footwear. · As soon as pain and swelling end, begin gentle exercises of your foot. Your doctor can tell you which exercises will help. When should you call for help? Call 911 anytime you think you may need emergency care.  For example, call if:  ? · Your foot turns pale, white, blue, or cold. ?Call your doctor now or seek immediate medical care if:  ? · You cannot move or stand on your foot. ? · Your foot looks twisted or out of its normal position. ? · Your foot is not stable when you step down. ? · You have signs of infection, such as:  ¨ Increased pain, swelling, warmth, or redness. ¨ Red streaks leading from the sore area. ¨ Pus draining from a place on your foot. ¨ A fever. ? · Your foot is numb or tingly. ? Watch closely for changes in your health, and be sure to contact your doctor if:  ? · You do not get better as expected. ? · You have bruises from an injury that last longer than 2 weeks. Where can you learn more? Go to http://angela-michelle.info/. Enter J396 in the search box to learn more about \"Foot Pain: Care Instructions. \"  Current as of: March 21, 2017  Content Version: 11.4  © 2729-2320 [employer]. Care instructions adapted under license by [employer] (which disclaims liability or warranty for this information). If you have questions about a medical condition or this instruction, always ask your healthcare professional. [place] [address] any warranty or liability for your use of this information.

## 2018-01-16 NOTE — PROCEDURES
DIAGNOSTIC STUDIES:  X-rays of the right foot and ankle today, [address] , five total views:     1. 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 2. There is a healed fibula fracture and distal tibia fracture with hardware in the fibula and the distal tibia. 3. There is some disuse osteopenia throughout the calcaneus, talus, and navicular as well. 4. Otherwise, I see no acute fracture, no subluxation, and no dislocation. The fracture lines are very hard to see in this x-rays, as it looks like she has healed her fractures on these x-rays. The patient had surgery by Dr. Anna Brown of [address] in August 2017.

## 2018-01-16 NOTE — MR AVS SNAPSHOT
[address] [place] [address], [address] [facility] 
[phone] Patient: Corrie Gutierrez MRN: [id number]:[id number] Visit Information Date & Time Provider Department Dept. Phone Encounter #  
 1/16/2018 10:15 AM Kamryn Mccormack MD [facility] [phone] [id number] Upcoming Health Maintenance Date Due Hepatitis C Screening 1965 DTaP/Tdap/Td series (1 - Tdap) 7/30/1984 BREAST CANCER SCRN MAMMOGRAM 7/30/2013 FOBT Q 1 YEAR AGE 50-75 7/30/2013 PAP AKA CERVICAL CYTOLOGY 2/1/2016 Influenza Age 5 to Adult 8/1/2017 Allergies as of 1/16/2018  Review Complete On: 1/16/2018 By: Adriel Bucio Severity Noted Reaction Type Reactions Latex  02/12/2013    Other (comments) \"YEAST INFECTION\" Amoxicillin  03/11/2013    Other (comments) Gave pt bad ha's Flagyl [Metronidazole]  11/12/2016    Other (comments) Pt states headaches and vomiting Urised [Meth-hyos-atrp-m Blue-ba-phsal]  10/16/2012    Itching, Swelling Current Immunizations  Never Reviewed No immunizations on file. Not reviewed this visit You Were Diagnosed With   
  
 Codes Comments Acute right ankle pain    -  Primary ICD-10-CM: M25.571 ICD-9-CM: 719.47, 338.19 Closed fracture of right tibia and fibula, initial encounter     ICD-10-CM: S82.201A, S82.401A ICD-9-CM: 823.82 Vitals BP Pulse Resp Height(growth percentile) Weight(growth percentile) SpO2  
 122/65 (BP 1 Location: Left arm, BP Patient Position: Sitting) 75 14 5' 6\" (1.676 m) 156 lb (70.8 kg) 100% BMI OB Status Smoking Status 25.18 kg/m2 Hysterectomy Former Smoker Vitals History BMI and BSA Data Body Mass Index Body Surface Area  
 25.18 kg/m 2 1.82 m 2 Preferred Pharmacy Pharmacy Name Phone  [facility] [address] [phone] Your Updated Medication List  
  
   
This list is accurate as of: 1/16/18 11:41 AM.  Always use your most recent med list.  
  
  
  
  
 alendronate 70 mg tablet Commonly known as:  FOSAMAX Take  by mouth. ondansetron 4 mg disintegrating tablet Commonly known as:  ZOFRAN ODT Take 1-2 Tabs by mouth every eight (8) hours as needed. Indications: PREVENTION OF POST-OPERATIVE NAUSEA AND VOMITING  
  
 oxyCODONE-acetaminophen 5-325 mg per tablet Commonly known as:  PERCOCET Take 1 to 2  tablet every 6 hours as needed for pain control. If you were instructed to try over the counter ibuprofen or tylenol, only take the percocet for pain not controlled with the over the counter medication. We Performed the Following AMB POC XRAY, FOOT; COMPLETE, 3+ VIEW [20974 CPT(R)] POC XRAY, ANKLE; 2 VIEWS [27106 CPT(R)] Patient Instructions Foot Pain: Care Instructions Your Care Instructions Foot injuries that cause pain and swelling are fairly common. Almost all sports or home repair projects can cause a misstep that ends up as foot pain. Normal wear and tear, especially as you get older, also can cause foot pain. Most minor foot injuries will heal on their own, and home treatment is usually all you need to do. If you have a severe injury, you may need tests and treatment. Follow-up care is a key part of your treatment and safety. Be sure to make and go to all appointments, and call your doctor if you are having problems. It's also a good idea to know your test results and keep a list of the medicines you take. How can you care for yourself at home? · Take pain medicines exactly as directed. ¨ If the doctor gave you a prescription medicine for pain, take it as prescribed. ¨ If you are not taking a prescription pain medicine, ask your doctor if you can take an over-the-counter medicine. · Rest and protect your foot. Take a break from any activity that may cause pain. · Put ice or a cold pack on your foot for 10 to 20 minutes at a time. Put a thin cloth between the ice and your skin. · Prop up the sore foot on a pillow when you ice it or anytime you sit or lie down during the next 3 days. Try to keep it above the level of your heart. This will help reduce swelling. · Your doctor may recommend that you wrap your foot with an elastic bandage. Keep your foot wrapped for as long as your doctor advises. · If your doctor recommends crutches, use them as directed. · Wear roomy footwear. · As soon as pain and swelling end, begin gentle exercises of your foot. Your doctor can tell you which exercises will help. When should you call for help? Call 911 anytime you think you may need emergency care. For example, call if: 
? · Your foot turns pale, white, blue, or cold. ?Call your doctor now or seek immediate medical care if: 
? · You cannot move or stand on your foot. ? · Your foot looks twisted or out of its normal position. ? · Your foot is not stable when you step down. ? · You have signs of infection, such as: 
¨ Increased pain, swelling, warmth, or redness. ¨ Red streaks leading from the sore area. ¨ Pus draining from a place on your foot. ¨ A fever. ? · Your foot is numb or tingly. ? Watch closely for changes in your health, and be sure to contact your doctor if: 
? · You do not get better as expected. ? · You have bruises from an injury that last longer than 2 weeks. Where can you learn more? Go to http://angela-michelle.info/. Enter M288 in the search box to learn more about \"Foot Pain: Care Instructions. \" Current as of: March 21, 2017 Content Version: 11.4 © 7373-4897 [employer].  Care instructions adapted under license by [employer] (which disclaims liability or warranty for this information). If you have questions about a medical condition or this instruction, always ask your healthcare professional. [place] [address] any warranty or liability for your use of this information. Introducing hospitals & [facility] SERVICES! [employer] introduces BOOK A TIGER patient portal. Now you can access parts of your medical record, email your doctor's office, and request medication refills online. 1. In your internet browser, go to https://"[employer]/"[employer]" 2. Click on the First Time User? Click Here link in the Sign In box. You will see the New Member Sign Up page. 3. Enter your BOOK A TIGER Access Code exactly as it appears below. You will not need to use this code after youve completed the sign-up process. If you do not sign up before the expiration date, you must request a new code. · BOOK A TIGER Access Code: [email or web address] Expires: 4/16/2018 11:41 AM 
 
4. Enter the last four digits of your Social Security Number (xxxx) and Date of Birth (mm/dd/yyyy) as indicated and click Submit. You will be taken to the next sign-up page. 5. Create a BOOK A TIGER ID. This will be your BOOK A TIGER login ID and cannot be changed, so think of one that is secure and easy to remember. 6. Create a BOOK A TIGER password. You can change your password at any time. 7. Enter your Password Reset Question and Answer. This can be used at a later time if you forget your password. 8. Enter your e-mail address. You will receive e-mail notification when new information is available in [address]. 9. Click Sign Up. You can now view and download portions of your medical record. 10. Click the Download Summary menu link to download a portable copy of your medical information. If you have questions, please visit the Frequently Asked Questions section of the BOOK A TIGER website. Remember, BOOK A TIGER is NOT to be used for urgent needs. For medical emergencies, dial 911. Now available from your iPhone and Android! Please provide this summary of care documentation to your next provider. Your primary care clinician is listed as Rebecca Cook. If you have any questions after today's visit, please call [phone].

## 2018-01-25 ENCOUNTER — HOSPITAL ENCOUNTER (OUTPATIENT)
Dept: PHYSICAL THERAPY | Age: 53
Discharge: HOME | End: 2018-01-25
Payer: COMMERCIAL

## 2018-01-25 PROCEDURE — 97535 SELF CARE MNGMENT TRAINING: CPT

## 2018-01-25 PROCEDURE — 97162 PT EVAL MOD COMPLEX 30 MIN: CPT

## 2018-01-25 PROCEDURE — 97110 THERAPEUTIC EXERCISES: CPT

## 2018-01-25 NOTE — PROGRESS NOTES
[facility]  [address] [facility] [address]  [place], [address]  [phone] fax    Plan of Care/ Statement of Necessity for Physical Therapy Services    Patient name: Rada Duverney Start of Care: 2018   Referral source: Tarah Velázquez MD : 1965    Medical Diagnosis: Pain in right ankle and joints of right foot [M25.571]  Unspecified fracture of shaft of right tibia, initial encounter for closed fracture [S82.201A]   Onset Date:2017    Treatment Diagnosis: s/p R ankle ORIF with limited mobility   Prior Hospitalization: see medical history Provider#: [id number]   Medications: Verified on Patient summary List    Comorbidities: Latex allergy, Hypotension, osteopenia   Prior Level of Function: relatively unlimited with daily activity, some limitations 2/2 plantar fasciitis     The Plan of Care and following information is based on the information from the initial evaluation. Assessment/ key information: Pt is a 45year old female who reports LOB and fall while in a R cam BOOT and fracturing her R Tibia and fibula with ORIF and hardware on 2017. She has since undergone therapy and progression through multiple devices on her R leg and now wears a regular shoe. She reports continued pain provocation with WB activity in her R ankle and foot, with intermittent R ant hip popping complaints that are uncomfortable when walking and changing positions. She presents with poor R ankle mobility significantly impairing her gait mechanics with compensatory hip motion, likely contributing to her hip discomfort, weakness in several planes particularly ankle plantar flexion, and demonstrates impaired stability. Pt will benefit from skilled PT to address her impairments with emphasis on R ankle mobility to facilitate improved gait mechanics and reduce reactionary hip pain 2/2 compensatory movement patterns.     Evaluation Complexity History MEDIUM  Complexity : 1-2 comorbidities / personal factors will impact the outcome/ POC ; Examination HIGH Complexity : 4+ Standardized tests and measures addressing body structure, function, activity limitation and / or participation in recreation  ;Presentation MEDIUM Complexity : Evolving with changing characteristics  ; Clinical Decision Making Other outcome measures clinician judgement  MEDIUM  Overall Complexity Rating: MEDIUM  Problem List: pain affecting function, decrease ROM, decrease strength, edema affecting function, impaired gait/ balance, decrease ADL/ functional abilitiies, decrease activity tolerance, decrease flexibility/ joint mobility and decrease transfer abilities   Treatment Plan may include any combination of the following: Therapeutic exercise, Therapeutic activities, Neuromuscular re-education, Physical agent/modality, Gait/balance training, Manual therapy, Patient education and Self Care training  Patient / Family readiness to learn indicated by: asking questions, trying to perform skills and interest  Persons(s) to be included in education: patient (P)  Barriers to Learning/Limitations: None  Patient Goal (s): Ability to walk freely with strength in foot and ankle and not fear falling because of the injury.   Patient Self Reported Health Status: good  Rehabilitation Potential: fair    Short Term Goals: To be accomplished in 2 weeks:   1. Patient will report performance of HEP at least 2 times per day to facilitate improved outcomes and improved self management. 2. Pt will demonstrate PROM R ankle DF to 5 degrees or better to improve gait mechanics. Long Term Goals: To be accomplished in 6 weeks:   1. Pt will demonstrate R ankle AROM DF to 8 degrees, Inv to 15 degrees, and PF to 45 or better to improve gait mechanics and ease of functional mobility. 2. Pt will demonstrate gait void of compensatory hip movement to reduce hip discomfort with ADLs.    3. Pt will demonstrate 4/5 global R ankle strength to improve ease of ADLs. 4. Pt will demonstrate ability to perform eccentric step down on 4 inch step void of compensation to indicate improving functional utilization of ankle mobility during daily activity. Frequency / Duration: Patient to be seen 1-2 times per week for 6 weeks. Patient/ Caregiver education and instruction: Diagnosis, prognosis, self care, activity modification and exercises   [x]  Plan of care has been reviewed with PTA    Geraldine Fajardo, PT, DPT, ATC 1/25/2018 6:08 PM    ________________________________________________________________________    I certify that the above Therapy Services are being furnished while the patient is under my care. I agree with the treatment plan and certify that this therapy is necessary.     [de-identified] Signature:____________________  Date:____________Time: _________    Please sign and return to [facility]  [address]  [place], [address]  [phone] fax

## 2018-01-25 NOTE — PROGRESS NOTES
PT DAILY TREATMENT NOTE/FOOT AND ANKLE EVAL 3-16    Patient Name: Regan Ortiz  Date:2018  : 1965  [x]  Patient  Verified  Payor: Ariadne Breed / Plan: [place] [employer]  CAPITATED PT / Product Type: Commerical /    In time:1:40pm  Out time:2:38pm  Total Treatment Time (min): 58  Total Timed Codes (min): 30  1:1 Treatment Time ( only): 62   Visit #: 1 of     Treatment Area: Pain in right ankle and joints of right foot [M25.571]  Unspecified fracture of shaft of right tibia, initial encounter for closed fracture [S82.201A]    SUBJECTIVE  Pain Level (0-10 scale): 1 @ rest  []constant []intermittent []improving []worsening []no change since onset    Any medication changes, allergies to medications, adverse drug reactions, diagnosis change, or new procedure performed?: [x] No    [] Yes (see summary sheet for update)  Subjective functional status/changes:   Pt reports LOB and fall while in a R cam BOOT and fracturing her R Tibia and fibula with ORIF and hardware on 2017. She has since undergone therapy and progression through multiple devices on her R leg and now wears a regular shoe. She reports continued pain provocation with WB activity in her R ankle and foot, with intermittent R ant hip popping complaints that are uncomfortable when walking and changing positions. She reports she is now referring to here for treatment as her insurance changed and she had to change all of her providers including her orthopaedic, in order to be in network. PLOF: relatively unlimited with daily activity, some limitations 2/2 plantar fasciitis  Limitations to PLOF: limited ambulation tolerance to under a few minutes 2/2 pain  Mechanism of Injury: fall and fracture  Current symptoms/Complaints: increased pain with movement and WB activity  Previous Treatment/Compliance: prior several month of PT including aquatic based PT, progression from boot to cast to nothing and progression of AD use.   PMHx/Surgical Hx: Latex allergy, Hypotension, osteopenia  Work Hx: see intake  Living Situation:   Pt Goals: see intake  Barriers: []pain []financial [x]time []transportation []other pt reports limited ability to attend 2/2 working in [place] and reports only able to come 1x/week  Motivation: appears motivated  Substance use: []Alcohol []Tobacco []other:   FABQ Score: []low []elevate  Cognition: A & O x 4    Other:    OBJECTIVE/EXAMINATION  Domestic Life:   Activity/Recreational Limitations:   Mobility:   Self Care:     28 min [x]Eval                  []Re-Eval     18 min Therapeutic Exercise:  [] See flow sheet : HEP creation and instruction per handout   Rationale: increase ROM and increase strength to improve the patients ability to improve ease of ADLs and ambulation tolerance. Self Care: 12 minutes pt education regarding activity modification, relevant anatomy, diagnosis, prognosis, plan of care to facilitate pt self management.           With   [] TE   [] TA   [] neuro   [] other: Patient Education: [x] Review HEP    [] Progressed/Changed HEP based on:   [] positioning   [] body mechanics   [] transfers   [] heat/ice application    [] other:      Other Objective/Functional Measures:    Physical Therapy Evaluation  - Foot and Ankle    Gait: [] Normal    [x] Abnormal    [x] Antalgic    [] NWB    Device: none  Describe: limited terminal ankle DF with early heel rise, compensatory hip circumduction, early & forced terminal knee extension in mid stance    ROM/Strength  [] Unable to assess at this time      AROM        PROM            Strength (1-5)   Left Right Left Right Left  Right   Dorsiflexion 10 -8  -5 5 4+   Plantarflexion 56 38  39 5 3-   Inversion 29 7  8 5 3+   Eversion 17 13  15 5 4+   Great Toe Ext WNL WNL       Great Toe Flex WNL Limited to 10 degrees   15 degrees with discomfort       Hip strength: flex 4/5 bilat, abd 4+/5 bilat void of pain ER 4/5 bilat    Flexibility: [] Unable to assess at this time  Gastroc: (L) Tightness [x] WNL   [] Min   [] Mod   [] Severe    (R) Tightness [] WNL   [] Min   [x] Mod   [] Severe  Soleus:    (L) Tightness [x] WNL   [] Min   [] Mod   [] Severe    (R) Tightness [] WNL   [] Min   [x] Mod   [] Severe  Other:      (L) Tightness [] WNL   [] Min   [] Mod   [] Severe    (R) Tightness [] WNL   [] Min   [] Mod   [] Severe    Palpation:   Location: R ant talocrural joint, anterior aspect of deltoid pigament, R 1st and 2nd proximal IP joints, R IT band  Patient's Pain Response: [x] Min   [] Mod   [] Severe  Location:  Patient's Pain Response: [] Min   [] Mod   [] Severe    Optional Tests:  Balance/Stork Test: touches/60sec (L): WNL (R): unable to perform    Single Leg Hop: unable on R   (L) Distance(ft):  (R) Distance(ft):  (L)/(R)%:   (L) Time(sec):  (R) Time(sec): (L)/(R)%:      Sub-talor alignment: [] Neutral     [] Pronation      [] Supination    Forefoot alignment:  [] Neutral     [] Varus            [] Valgus    Swelling:   Left (cm) Right (cm)   Figure 8:     Midfoot:      Malleoli Level:     MTH:        Anterior Drawer: [] Neg    [] Pos  Posterior Drawer:  [] Neg    [] Pos  Inversion Stress:  [] Neg    [] Pos  Talar Tilt:   [] Neg    [] Pos  Eversion Stress:  [] Neg    [] Pos  Gricelda's Sign:  [] Neg    [] Pos  Duong Test: [] Neg    [] Pos    Other tests/ comments:  (-) TAMMY test     Pain Level (0-10 scale) post treatment: 4    ASSESSMENT/Changes in Function: Per POC. Patient will continue to benefit from skilled PT services to modify and progress therapeutic interventions, address functional mobility deficits, address ROM deficits, address strength deficits, analyze and address soft tissue restrictions, analyze and cue movement patterns, analyze and modify body mechanics/ergonomics and instruct in home and community integration to attain remaining goals.      [x]  See Plan of Care  []  See progress note/recertification  []  See Discharge Summary         Progress towards goals / Updated goals:  Per POC. PLAN  [x]  Upgrade activities as tolerated     [x]  Continue plan of care  []  Update interventions per flow sheet       []  Discharge due to:_  [x]  Other:_Address poor R ankle mobility and improve gait mechanics, gradually progress ankle strength as mobility improves. Address R hip secondarily as this is likely reactionary to impaired gait mechanics 2/2 R ankle mobility.       Kendra Licona, PT, DPT, ATC 1/25/2018  1:45 PM

## 2018-02-02 ENCOUNTER — HOSPITAL ENCOUNTER (OUTPATIENT)
Dept: PHYSICAL THERAPY | Age: 53
Discharge: HOME | End: 2018-02-02
Payer: COMMERCIAL

## 2018-02-02 PROCEDURE — 97110 THERAPEUTIC EXERCISES: CPT

## 2018-02-02 PROCEDURE — 97140 MANUAL THERAPY 1/> REGIONS: CPT

## 2018-02-02 NOTE — PROGRESS NOTES
PT DAILY TREATMENT NOTE     Patient Name: Nicolas Bryant  Date:2018  : 1965  [x]  Patient  Verified  Payor: Tino Stanton / [place]: [place] [employer]  CAPITATED PT / Product Type: Commerical /    In time:3:30  Out time:4:12  Total Treatment Time (min): 42  Visit #: 2 of     Treatment Area: Pain in right ankle and joints of right foot [M25.571]  Unspecified fracture of shaft of right tibia, initial encounter for closed fracture [S82.201A]    SUBJECTIVE  Pain Level (0-10 scale): 5  Any medication changes, allergies to medications, adverse drug reactions, diagnosis change, or new procedure performed?: [x] No    [] Yes (see summary sheet for update)  Subjective functional status/changes:   [] No changes reported  Pt reports HEP compliance    OBJECTIVE    Modality rationale:    Min Type Additional Details    [] Estim:  []Unatt       []IFC  []Premod                        []Other:  []w/ice   []w/heat  Position:  Location:    [] Estim: []Att    []TENS instruct  []NMES                    []Other:  []w/US   []w/ice   []w/heat  Position:  Location:    []  Traction: [] Cervical       []Lumbar                       [] Prone          []Supine                       []Intermittent   []Continuous Lbs:  [] before manual  [] after manual    []  Ultrasound: []Continuous   [] Pulsed                           []1MHz   []3MHz W/cm2:  Location:    []  Iontophoresis with dexamethasone         Location: [] Take home patch   [] In clinic    []  Ice     []  heat  []  Ice massage  []  Laser   []  Anodyne Position:  Location:    []  Laser with stim  []  Other:  Position:  Location:    []  Vasopneumatic Device Pressure:       [] lo [] med [] hi   Temperature: [] lo [] med [] hi   [] Skin assessment post-treatment:  []intact []redness- no adverse reaction    []redness - adverse reaction:       32 min Therapeutic Exercise:  [x] See flow sheet :   Rationale: increase ROM and increase strength to improve the patients ability to perform functional tasks. 10 min Manual Therapy:  Graston to R gastroc and achilles with GT5 and GT 2 with weight of instrument pressure   Rationale: increase ROM and increase tissue extensibility to improve gait              With   [] TE   [] TA   [] neuro   [] other: Patient Education: [x] Review HEP    [] Progressed/Changed HEP based on:   [] positioning   [] body mechanics   [] transfers   [] heat/ice application    [x] other: Graston purpose, possible bruising     Other Objective/Functional Measures: initiated therex per flow sheet     Pain Level (0-10 scale) post treatment: 5    ASSESSMENT/Changes in Function: pt with limited tolerance to Graston. Patient will continue to benefit from skilled PT services to modify and progress therapeutic interventions, address functional mobility deficits, address ROM deficits, address strength deficits, analyze and address soft tissue restrictions and analyze and cue movement patterns to attain remaining goals. []  See Plan of Care  []  See progress note/recertification  []  See Discharge Summary         Progress towards goals / Updated goals:     Short Term Goals: To be accomplished in 2 weeks:                         1. Patient will report performance of HEP at least 2 times per day to facilitate improved outcomes and improved self management. 2. Pt will demonstrate PROM R ankle DF to 5 degrees or better to improve gait mechanics. Long Term Goals: To be accomplished in 6 weeks:                         1. Pt will demonstrate R ankle AROM DF to 8 degrees, Inv to 15 degrees, and PF to 45 or better to improve gait mechanics and ease of functional mobility. 2. Pt will demonstrate gait void of compensatory hip movement to reduce hip discomfort with ADLs. 3. Pt will demonstrate 4/5 global R ankle strength to improve ease of ADLs.                          4. Pt will demonstrate ability to perform eccentric step down on 4 inch step void of compensation to indicate improving functional utilization of ankle mobility during daily     PLAN  []  Upgrade activities as tolerated     [x]  Continue plan of care  []  Update interventions per flow sheet       []  Discharge due to:_  []  Other:_      Nabil Lee, PT 2/2/2018  4:42 PM    Future Appointments  Date Time Provider Monique Bullard   2/9/2018 3:00 PM Aditi Chroman [facility]   2/12/2018 1:00 PM Aditi Chroman [facility]   2/13/2018 3:50 PM Sumaya Stevens MD [facility] [address]   2/16/2018 3:00 PM Aditi Chroman [facility]   2/19/2018 12:00 PM Aditi Chroman [facility]   2/26/2018 12:30 PM Idalia Turcios [facility]   3/2/2018 2:00 PM Idalia Turcios [facility]

## 2018-02-09 ENCOUNTER — HOSPITAL ENCOUNTER (OUTPATIENT)
Dept: PHYSICAL THERAPY | Age: 53
Discharge: HOME | End: 2018-02-09
Payer: COMMERCIAL

## 2018-02-09 PROCEDURE — 97110 THERAPEUTIC EXERCISES: CPT

## 2018-02-09 PROCEDURE — 97140 MANUAL THERAPY 1/> REGIONS: CPT

## 2018-02-09 NOTE — PROGRESS NOTES
PT DAILY TREATMENT NOTE     Patient Name: Tiarra Rolle  Date:2018  : 1965  [x]  Patient  Verified  Payor: Jad Fine / Plan: [place] [employer]  CAPITATED PT / Product Type: Commerical /    In time:2:59pm  Out time:3:44pm  Total Treatment Time (min): 45  Visit #: 3 of     Treatment Area: Pain in right ankle and joints of right foot [M25.571]  Unspecified fracture of shaft of right tibia, initial encounter for closed fracture [S82.201A]    SUBJECTIVE  Pain Level (0-10 scale): 5  Any medication changes, allergies to medications, adverse drug reactions, diagnosis change, or new procedure performed?: [x] No    [] Yes (see summary sheet for update)  Subjective functional status/changes:   [] No changes reported  Pt reports difficulty performing her HEP 2/2 pain. Advised her regarding working into ankle mobility limitations, but not to the point where she is unable to 2/2 pain. She also reports limited ability 2/2 time constraints. She also reports inability to perform any stretches bending over 2/2 vestibular neuritis and dizziness. OBJECTIVE  35 min Therapeutic Exercise:  [x] See flow sheet :   Rationale: increase ROM and increase strength to improve the patients ability to improve ease of ADLs. 10 min Manual Therapy:  Stick to R gastroc/soleus, talocrural mobilizations grade 2-3 to improve DF and PF   Rationale: decrease pain, increase ROM and increase tissue extensibility to improve ankle mobility and gait mechanics. With   [] TE   [] TA   [] neuro   [] other: Patient Education: [x] Review HEP    [] Progressed/Changed HEP based on:   [] positioning   [] body mechanics   [] transfers   [] heat/ice application    [] other:      Other Objective/Functional Measures:      Pain Level (0-10 scale) post treatment: 4    ASSESSMENT/Changes in Function: Pt demonstrates limited ankle mobility and reports frustration with limited perceived progress.  She demonstrates fair WB tolerance on RLE but unstable with SLS interventions requiring 1 UE assist.    Patient will continue to benefit from skilled PT services to modify and progress therapeutic interventions, address functional mobility deficits, address ROM deficits, address strength deficits, analyze and address soft tissue restrictions and analyze and cue movement patterns to attain remaining goals. []  See Plan of Care  []  See progress note/recertification  []  See Discharge Summary         Progress towards goals / Updated goals:  Short Term Goals: To be accomplished in 2 weeks:                         3. Patient will report performance of HEP at least 2 times per day to facilitate improved outcomes and improved self management.                         2. Pt will demonstrate PROM R ankle DF to 5 degrees or better to improve gait mechanics. Long Term Goals: To be accomplished in 6 weeks:                         1. Pt will demonstrate R ankle AROM DF to 8 degrees, Inv to 15 degrees, and PF to 45 or better to improve gait mechanics and ease of functional mobility.                        3. Pt will demonstrate gait void of compensatory hip movement to reduce hip discomfort with ADLs.                        8. Pt will demonstrate 4/5 global R ankle strength to improve ease of ADLs.                          7. Pt will demonstrate ability to perform eccentric step down on 4 inch step void of compensation to indicate improving functional utilization of ankle mobility during daily     PLAN  []  Upgrade activities as tolerated     [x]  Continue plan of care  []  Update interventions per flow sheet       []  Discharge due to:_  []  Other:_      Kenroy Waggoner, PT, DPT, ATC 2/9/2018  3:03 PM    Future Appointments  Date Time Provider Monique Bullard   2/12/2018 1:00 PM [address] [facility]   2/16/2018 3:00 PM [address] [facility]   2/19/2018 12:00 PM [address] [facility]   2/26/2018 12:30 PM [address] [facility]   3/2/2018 2:00 PM Teresa Garcia, [facility]   3/20/2018 10:40 AM MD Jason Hagen

## 2018-02-12 ENCOUNTER — HOSPITAL ENCOUNTER (OUTPATIENT)
Dept: PHYSICAL THERAPY | Age: 53
Discharge: HOME | End: 2018-02-12
Payer: COMMERCIAL

## 2018-02-12 PROCEDURE — 97110 THERAPEUTIC EXERCISES: CPT

## 2018-02-12 PROCEDURE — 97112 NEUROMUSCULAR REEDUCATION: CPT

## 2018-02-12 PROCEDURE — 97140 MANUAL THERAPY 1/> REGIONS: CPT

## 2018-02-12 NOTE — PROGRESS NOTES
PT DAILY TREATMENT NOTE     Patient Name: Prema Best  Date:2018  : 1965  [x]  Patient  Verified  Payor: Stefanie Dudley / Plan: [facility] / Product Type: Commerical /    In time:1:00pm  Out time:1:51pm  Total Treatment Time (min): 51  Visit #: 4 of     Treatment Area: Pain in right ankle and joints of right foot [M25.571]  Unspecified fracture of shaft of right tibia, initial encounter for closed fracture [S82.201A]    SUBJECTIVE  Pain Level (0-10 scale): 5  Any medication changes, allergies to medications, adverse drug reactions, diagnosis change, or new procedure performed?: [x] No    [] Yes (see summary sheet for update)  Subjective functional status/changes:   [] No changes reported  \"It was better after last time, but when I woke up the next day it was right back where it was. \" Pt reports she has been performing her HEP. OBJECTIVE    29 min Therapeutic Exercise:  [x] See flow sheet :   Rationale: increase ROM and increase strength to improve the patients ability to improve ease of ADLs. 12 min Neuromuscular Re-education:  [x]  See flow sheet :   Rationale: increase strength, improve coordination and increase proprioception  to improve the patients ability to improve stability during ambulatory activity.      10 min Manual Therapy:  R talocrural joint mobilizations to improve DF and PF grade 2-4, PF and DF manual stretch, R distal gastroc/soleus STM   Rationale: decrease pain, increase ROM and increase tissue extensibility to improve functional ankle mobility to improve ease of ambulation          With   [] TE   [] TA   [] neuro   [] other: Patient Education: [x] Review HEP    [] Progressed/Changed HEP based on:   [] positioning   [] body mechanics   [] transfers   [] heat/ice application    [] other:      Other Objective/Functional Measures:     Instability on RLE with SLS interventions with multiple LOB and UE recovery    ANKLE DF AROM/PROM: -3/-1 hard end feel  PF AROM/PROM: 49/51    Progressed to performing SLS interventions void of UE assist where able    Pain Level (0-10 scale) post treatment: 5    ASSESSMENT/Changes in Function: Pt struggles with R Single limb stability and continues to demonstrate limited ankle DF mobility limiting gait mechanics with notably hard end feel. Patient will continue to benefit from skilled PT services to modify and progress therapeutic interventions, address functional mobility deficits, address ROM deficits, address strength deficits, analyze and address soft tissue restrictions and analyze and cue movement patterns to attain remaining goals. []  See Plan of Care  []  See progress note/recertification  []  See Discharge Summary         Progress towards goals / Updated goals:  Short Term Goals: To be accomplished in 2 weeks:                         4. Patient will report performance of HEP at least 2 times per day to facilitate improved outcomes and improved self management. Pt reports compliance with HEP 2/12/2018                         2. Pt will demonstrate PROM R ankle DF to 5 degrees or better to improve gait mechanics. Not met -1 degrees from neutral 2/12/2018  Long Term Goals: To be accomplished in 6 weeks:                         1. Pt will demonstrate R ankle AROM DF to 8 degrees, Inv to 15 degrees, and PF to 45 or better to improve gait mechanics and ease of functional mobility.                        6. Pt will demonstrate gait void of compensatory hip movement to reduce hip discomfort with ADLs. Not met 2/12/2018                         3. Pt will demonstrate 4/5 global R ankle strength to improve ease of ADLs.                          9. Pt will demonstrate ability to perform eccentric step down on 4 inch step void of compensation to indicate improving functional utilization of ankle mobility during daily     PLAN  []  Upgrade activities as tolerated     [x]  Continue plan of care  []  Update interventions per flow sheet []  Discharge due to:_  []  Other:_      Brenda Grewal, PT, DPT, ATC 2/12/2018  1:05 PM    Future Appointments  Date Time Provider Monique Rosei   2/16/2018 3:00 PM Brenda Grewal [facility]   2/19/2018 12:00 PM [address] [facility]   2/26/2018 5:30 PM [address] [facility]   3/2/2018 2:00 PM Monica Person, PTA [id number]   3/20/2018 10:40 AM Stephania Vieira MD [place] [id number]

## 2018-02-13 ENCOUNTER — TELEPHONE (OUTPATIENT)
Dept: SURGERY | Age: 53
End: 2018-02-13

## 2018-02-16 ENCOUNTER — HOSPITAL ENCOUNTER (OUTPATIENT)
Dept: PHYSICAL THERAPY | Age: 53
Discharge: HOME | End: 2018-02-16
Payer: COMMERCIAL

## 2018-02-16 PROCEDURE — 97110 THERAPEUTIC EXERCISES: CPT

## 2018-02-16 PROCEDURE — 97140 MANUAL THERAPY 1/> REGIONS: CPT

## 2018-02-16 PROCEDURE — 97112 NEUROMUSCULAR REEDUCATION: CPT

## 2018-02-19 ENCOUNTER — HOSPITAL ENCOUNTER (OUTPATIENT)
Dept: PHYSICAL THERAPY | Age: 53
Discharge: HOME | End: 2018-02-19
Payer: COMMERCIAL

## 2018-02-19 PROCEDURE — 97110 THERAPEUTIC EXERCISES: CPT

## 2018-02-19 PROCEDURE — 97112 NEUROMUSCULAR REEDUCATION: CPT

## 2018-02-19 PROCEDURE — 97140 MANUAL THERAPY 1/> REGIONS: CPT

## 2018-02-19 NOTE — PROGRESS NOTES
PT DAILY TREATMENT NOTE     Patient Name: Leon Middleton  Date:2018  : 1965  [x]  Patient  Verified  Payor: Eduar Neff / [facility]: [facility] / Product Type: Commerical /    In time: 12:09  Out time:1:04  Total Treatment Time (min): 47  Visit #: 6 of 8    Treatment Area: Pain in right ankle and joints of right foot [M25.571]  Unspecified fracture of shaft of right tibia, initial encounter for closed fracture [S82.201A]    SUBJECTIVE  Pain Level (0-10 scale): 4/10  Any medication changes, allergies to medications, adverse drug reactions, diagnosis change, or new procedure performed?: [x] No    [] Yes (see summary sheet for update)  Subjective functional status/changes:   [] No changes reported  The patient states that her leg is sore with regards     OBJECTIVE  29 min Therapeutic Exercise:  [x] See flow sheet :   Rationale: increase ROM and increase strength to improve the patients ability to improve ADL ease. 10 min Neuromuscular Re-education:  [x]  See flow sheet :   Rationale: increase ROM and increase strength  to improve the patients ability to improve ADL ease. 9 min Gait Training:  _120__ feet with _no__ device on level surfaces with ___ level of assist   Rationale: To maximize ambulation efficiency. With   [] TE   [] TA   [] neuro   [] other: Patient Education: [x] Review HEP    [] Progressed/Changed HEP based on:   [] positioning   [] body mechanics   [] transfers   [] heat/ice application    [] other:      Other Objective/Functional Measures:  DF: 4 degrees  PF: 44  IV: 25  EV: 4     Strength: 4+/5 MMT DF/IV/EV    Ambulation noted with decreased propulsion via terminal stance phase. Improved normalization of gait following cues. Pain Level (0-10 scale) post treatment: 5/10    ASSESSMENT/Changes in Function: Encouraged the patient to mentally work towards progressing her gait and normalizing stance phase with heel strike and effective toe off during propulsion. Fair progress with strength, slow progress with ROM. Patient will continue to benefit from skilled PT services to modify and progress therapeutic interventions, address functional mobility deficits, address ROM deficits, address strength deficits, analyze and address soft tissue restrictions, analyze and cue movement patterns, analyze and modify body mechanics/ergonomics, assess and modify postural abnormalities, address imbalance/dizziness and instruct in home and community integration to attain remaining goals. []  See Plan of Care  []  See progress note/recertification  []  See Discharge Summary         Progress towards goals / Updated goals:  Short Term Goals: To be accomplished in 2 weeks:                         5. Patient will report performance of HEP at least 2 times per day to facilitate improved outcomes and improved self management. Pt reports compliance with HEP 2/12/2018                         2. Pt will demonstrate PROM R ankle DF to 5 degrees or better to improve gait mechanics. Not met -1 degrees from neutral 2/12/2018; 4 degrees DF 2/19/2018  Long Term Goals: To be accomplished in 6 weeks:                         1. Pt will demonstrate R ankle AROM DF to 8 degrees, Inv to 15 degrees, and PF to 45 or better to improve gait mechanics and ease of functional mobility. Progressed; meeting IV and PF, not met DF 2/19/2018                         2. Pt will demonstrate gait void of compensatory hip movement to reduce hip discomfort with ADLs. Not met 2/12/2018                         3. Pt will demonstrate 4/5 global R ankle strength to improve ease of ADLs.  Met 4/5 MMT 2/19/2018                         4. Pt will demonstrate ability to perform eccentric step down on 4 inch step void of compensation to indicate improving functional utilization of ankle mobility during daily Not met 2/19/2018    PLAN  []  Upgrade activities as tolerated     [x]  Continue plan of care  []  Update interventions per flow sheet       []  Discharge due to:_  []  Other:_      Funmilayo Mosher, PT 2/19/2018  1:31 PM    Future Appointments  Date Time Provider Monique Bullard   2/20/2018 1:40 PM Stephania Vieira MD [facility] [place] [address]   2/26/2018 5:30 PM [address] [facility]   3/2/2018 2:00 PM Monica Person PTA [id number]

## 2018-02-20 ENCOUNTER — OFFICE VISIT (OUTPATIENT)
Dept: ORTHOPEDIC SURGERY | Age: 53
End: 2018-02-20

## 2018-02-20 VITALS
BODY MASS INDEX: 25.01 KG/M2 | SYSTOLIC BLOOD PRESSURE: 121 MMHG | HEART RATE: 77 BPM | WEIGHT: 155.6 LBS | OXYGEN SATURATION: 100 % | DIASTOLIC BLOOD PRESSURE: 71 MMHG | HEIGHT: 66 IN | TEMPERATURE: 98.3 F

## 2018-02-20 DIAGNOSIS — S82.831D CLOSED FRACTURE OF DISTAL END OF RIGHT FIBULA AND TIBIA WITH ROUTINE HEALING, SUBSEQUENT ENCOUNTER: Primary | ICD-10-CM

## 2018-02-20 DIAGNOSIS — S82.301D CLOSED FRACTURE OF DISTAL END OF RIGHT FIBULA AND TIBIA WITH ROUTINE HEALING, SUBSEQUENT ENCOUNTER: Primary | ICD-10-CM

## 2018-02-20 RX ORDER — HYDROCODONE BITARTRATE AND ACETAMINOPHEN 5; 325 MG/1; MG/1
TABLET ORAL
COMMUNITY
End: 2018-09-13

## 2018-02-20 RX ORDER — OMEPRAZOLE 20 MG/1
20 CAPSULE, DELAYED RELEASE ORAL DAILY
Qty: 30 CAP | Refills: 0 | Status: CANCELLED | OUTPATIENT
Start: 2018-02-20

## 2018-02-20 RX ORDER — MELOXICAM 15 MG/1
15 TABLET ORAL
Qty: 30 TAB | Refills: 0 | Status: SHIPPED | OUTPATIENT
Start: 2018-02-20 | End: 2018-06-11

## 2018-02-20 NOTE — PROGRESS NOTES
Patient is here today for a follow up on her right ankle. She is still currently in PT, where it has been recommended that she start \"some pushing off exercises\"        On exam she still lacks some internal movement of the right foot.

## 2018-02-20 NOTE — PATIENT INSTRUCTIONS
Achilles Tendon: Exercises  Your Care Instructions  Here are some examples of exercises for your achilles tendon. Start each exercise slowly. Ease off the exercise if you start to have pain. Your doctor or physical therapist will tell you when you can start these exercises and which ones will work best for you. How to do the exercises  Toe stretch    1. Sit in a chair, and extend your affected leg so that your heel is on the floor. 2. With your hand, reach down and pull your big toe up and back. Pull toward your ankle and away from the floor. 3. Hold the position for at least 15 to 30 seconds. 4. Repeat 2 to 4 times a session, several times a day. Calf-plantar fascia stretch    1. Sit with your legs extended and knees straight. 2. Place a towel around your foot just under the toes. 3. Hold each end of the towel in each hand, with your hands above your knees. 4. Pull back with the towel so that your foot stretches toward you. 5. Hold the position for at least 15 to 30 seconds. 6. Repeat 2 to 4 times a session, up to 5 sessions a day. Floor stretch    1. Stand about 2 feet from a wall, and place your hands on the wall at about shoulder height. Or you can stand behind a chair, placing your hands on the back of it for balance. 2. Step back with the leg you want to stretch. Keep the leg straight, and press your heel into the floor with your toe turned slightly in.  3. Lean forward, and bend your other leg slightly. Feel the stretch in the Achilles tendon of your back leg. Hold for at least 15 to 30 seconds. 4. Repeat 2 to 4 times a session, up to 5 sessions a day. Stair stretch    1. Stand with the balls of both feet on the edge of a step or curb (or a medium-sized phone book). With at least one hand, hold onto something solid for balance, such as a banister or handrail.   2. Keeping your affected leg straight, slowly let that heel hang down off of the step or curb until you feel a stretch in the back of your calf and/or Achilles area. Some of your weight should still be on the other leg. 3. Hold this position for at least 15 to 30 seconds. 4. Repeat 2 to 4 times a session, up to 5 times a day or whenever your Achilles tendon starts to feel tight. This stretch can also be done with your knee slightly bent. Strength exercise    1. This exercise will get you started on building strength after an Achilles tendon injury. Your doctor or physical therapist can help you move on to more challenging exercises as you heal and get stronger. 2. Stand on a step with your heel off the edge of the step. Hold on to a handrail or wall for balance. 3. Push up on your toes, then slowly count to 10 as you lower yourself back down until your heel is below the step. If it hurts to push up on your toes, try putting most of your weight on your other foot as you push up, or try using your arms to help you. If you can't do this exercise without causing pain, stop the exercise and talk to your doctor. 4. Repeat the exercise 8 to 12 times, half with the knee straight and half with the knee bent. Follow-up care is a key part of your treatment and safety. Be sure to make and go to all appointments, and call your doctor if you are having problems. It's also a good idea to know your test results and keep a list of the medicines you take. Where can you learn more? Go to http://angela-michelle.info/. Enter V876 in the search box to learn more about \"Achilles Tendon: Exercises. \"  Current as of: March 21, 2017  Content Version: 11.4  © 5941-9262 Healthwise, Incorporated. Care instructions adapted under license by [employer] (which disclaims liability or warranty for this information). If you have questions about a medical condition or this instruction, always ask your healthcare professional. [address] any warranty or liability for your use of this information.        Ankle: Exercises  Your Care Instructions  Here are some examples of exercises for your ankle. Start each exercise slowly. Ease off the exercise if you start to have pain. Your doctor or physical therapist will tell you when you can start these exercises and which ones will work best for you. How to do the exercises  \"Alphabet\" exercise    5. Trace the alphabet with your toe. This helps your ankle move in all directions. Side-to-side knee swing exercise    7. Sit in a chair with your foot flat on the floor. 8. Slowly move your knee from side to side while keeping your foot pressed flat. 9. Continue this exercise for 2 to 3 minutes. Towel curl    5. While sitting, place your foot on a towel on the floor and scrunch the towel toward you with your toes. 6. Then use your toes to push the towel away from you. 7. Make this exercise more challenging by placing a weighted object, such as a soup can, on the other end of the towel. Towel stretch    5. Sit with your legs extended and knees straight. 6. Place a towel around your foot just under the toes. 7. Hold each end of the towel in each hand, with your hands above your knees. 8. Pull back with the towel so that your foot stretches toward you. 9. Hold the position for at least 15 to 30 seconds. 10. Repeat 2 to 4 times a session, up to 5 sessions a day. Ankle eversion exercise    5. Start by sitting with your foot flat on the floor and pushing it outward against an immovable object such as the wall or heavy furniture. Hold for about 6 seconds, then relax. Repeat 8 to 12 times. 6. After you feel comfortable with this, try using rubber tubing looped around the outside of your feet for resistance. Push your foot out to the side against the tubing, and then count to 10 as you slowly bring your foot back to the middle. Repeat 8 to 12 times. Isometric opposition exercises    1. While sitting, put your feet together flat on the floor.   2. Press your injured foot inward against your other foot. Hold for about 6 seconds, and relax. Repeat 8 to 12 times. 3. Then place the heel of your other foot on top of the injured one. Push down with the top heel while trying to push up with your injured foot. Hold for about 6 seconds, and relax. Repeat 8 to 12 times. Follow-up care is a key part of your treatment and safety. Be sure to make and go to all appointments, and call your doctor if you are having problems. It's also a good idea to know your test results and keep a list of the medicines you take. Where can you learn more? Go to http://nagela-michelle.info/. Enter J757 in the search box to learn more about \"Ankle: Exercises. \"  Current as of: March 21, 2017  Content Version: 11.4  © 0150-9360 Healthwise, Incorporated. Care instructions adapted under license by [employer] (which disclaims liability or warranty for this information). If you have questions about a medical condition or this instruction, always ask your healthcare professional. [address] any warranty or liability for your use of this information. Patient states that she has a medication for acid reflux at home and does not want us to prescribe the Prilosec.

## 2018-02-20 NOTE — PROGRESS NOTES
AMBULATORY PROGRESS NOTE      Patient: Corrie Gutierrez             MRN: [id number]     SSN: [id number] Body mass index is 25.11 kg/(m^2). YOB: 1965     AGE: 45 y.o. EX: female    PCP: Tino Gonzáles MD       IMPRESSION/DIAGNOSIS AND TREATMENT PLAN      DIAGNOSES  1. Closed fracture of distal end of right fibula and tibia with routine healing, subsequent encounter        Orders Placed This Encounter    meloxicam (MOBIC) 15 mg tablet      Corrie Gutierrez understands her diagnoses and the proposed plan. PLAN //  HPI AND EXAMINATION      Corrie Gutierrez IS A 45 y.o. 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 In any event, I have seen her in the past for a right ankle sprain, as well as diagnosis of a right distal tibia and fibula fracture. She had a pilon fracture treated by Dr. Concetta Tucker of [address] I am seeing her here for followup care. She is currently receiving formal physical therapy. She has tried the [employer] It has been horrible for her. PLAN:      1. We are going to stop the [employer]. 2. Continue on her home exercise program with one or two more sessions of formal physical therapy. We will have her do the rest on her own at home, as she is progressing fairly well, but still has some stiffness and has some postop swelling from her ankle. I explained to her that she will have some postop swelling of her ankle for at least another four to six months. This is certainly more than just an ankle sprain. This is a distal tibia or pilon fracture with fibula fracture, for which she underwent ORIF of her tibia and fibula through an anterior approach and lateral approach. On examination, the patient is alert and follows commands. She is in no acute distress.   Her affect and mood are appropriate. She has swelling of her right foot and ankle distally in the extraarticular region with some slight pitting. Her calf is soft and nontender. External rotation stress test is negative for tenderness. Peripheral pulses are intact. Toes are nice and warm and pink. There are no signs of DVT or thrombophlebitis. She walks with just a very slight limp at this current time. CHART REVIEW      Past Medical History:   Diagnosis Date    Colon polyps     Constipation     Heart murmur     Hemorrhoids     Mitral valve prolapse     Nausea & vomiting     \"VIOLENTLY SICK AFTER TUBAL LIGATION\"    Plantar fasciitis     Vestibular neuritis      Current Outpatient Prescriptions   Medication Sig    HYDROcodone-acetaminophen (NORCO) 5-325 mg per tablet Take  by mouth.  meloxicam (MOBIC) 15 mg tablet Take 1 Tab by mouth daily (with breakfast).  alendronate (FOSAMAX) 70 mg tablet Take  by mouth.  oxyCODONE-acetaminophen (PERCOCET) 5-325 mg per tablet Take 1 to 2  tablet every 6 hours as needed for pain control. If you were instructed to try over the counter ibuprofen or tylenol, only take the percocet for pain not controlled with the over the counter medication.  ondansetron (ZOFRAN ODT) 4 mg disintegrating tablet Take 1-2 Tabs by mouth every eight (8) hours as needed. Indications: PREVENTION OF POST-OPERATIVE NAUSEA AND VOMITING     No current facility-administered medications for this visit.       Allergies   Allergen Reactions    Latex Other (comments)     \"YEAST INFECTION\"    Amoxicillin Other (comments)     Gave pt bad ha's    Flagyl [Metronidazole] Other (comments)     Pt states headaches and vomiting      Urised [Meth-Hyos-Atrp-M Blue-Ba-Phsal] Itching and Swelling     Past Surgical History:   Procedure Laterality Date    HX ANKLE FRACTURE TX      HX BREAST LUMPECTOMY      HX COLONOSCOPY      2010    HX CYST REMOVAL  2010    right breast    HX GYN hysterectomy    HX HEENT      tonselectomy    HX HEENT      lasik eye surgery    HX OTHER SURGICAL      cystoscopy    HX OTHER SURGICAL  2010    right ovary removed    HX TUBAL LIGATION       Social History     Occupational History    Not on file. Social History Main Topics    Smoking status: Former Smoker    Smokeless tobacco: Never Used    Alcohol use No    Drug use: No    Sexual activity: Not on file     Family History   Problem Relation Age of Onset    Cancer Sister      colon    Cancer Father      colon    Cancer Maternal Grandmother      colon        REVIEW OF SYSTEMS : 2/20/2018  ALL BELOW ARE Negative except : SEE HPI      Constitutional: Negative for fever, chills and weight loss. Neg Weight Loss  Cardiovascular: Negative for chest pain, claudication and leg swelling. SOB, PORTER   Gastrointestinal/Urological: Negative for pain, N/V/D/C, Blood in stool or urine,dysuria,  Hematuria, Incontinence  Endocrine: See HPI  Skin: see HPI  Musculoskeletal: see HPI. Neurological: Negative for dizziness and weakness, headaches,Visual Changes or   Confusion, or Seizures,   Psychiatric/Behavioral: Negative for depression, memory loss and substance abuse. Extremities:  Negative for hair changes, rash or skin lesion changes. Hematologic: Negative for Bleeding problems, bruising, pallor or swollen lymph nodes. Peripheral Vascular: No calf pain, vascular vein tenderness to calf pain              No calf throbbing, posterior knee throbbing pain     DIAGNOSTIC IMAGING       No notes on file     Please see above section of this report. I have personally reviewed the results of the above study. The interpretation of this study is my professional opinion.       Alberta Molina MD  2/20/2018  2:24 PM

## 2018-02-20 NOTE — LETTER
NOTIFICATION RETURN TO WORK / SCHOOL 
 
2/20/2018 2:43 PM 
 
Ms. Regan Ortiz 
[address] 
[address] [facility] [id number] To Whom It May Concern: 
 
Regan Ortiz is currently under the care of [address] [place] [address] Mayito Turner. Please have an adult school member load and unload her car. She is unable to lift anything over 10 pounds. These restrictions will be in place for 6 months. If there are questions or concerns please have the patient contact our office.  
 
 
 
Sincerely, 
 
 
Stephania Vieira MD

## 2018-02-20 NOTE — MR AVS SNAPSHOT
[address] [place] [address], [address] [facility] 
[phone] Patient: Ni Mathis MRN: [id number] TRL:[id number] Visit Information Date & Time Provider Department Dept. Phone Encounter #  
 2/20/2018  1:40 PM Johnathon Becerril MD [facility] [phone] Upcoming Health Maintenance Date Due Hepatitis C Screening 1965 DTaP/Tdap/Td series (1 - Tdap) 7/30/1984 BREAST CANCER SCRN MAMMOGRAM 7/30/2013 FOBT Q 1 YEAR AGE 50-75 7/30/2013 PAP AKA CERVICAL CYTOLOGY 2/1/2016 Influenza Age 5 to Adult 8/1/2017 Allergies as of 2/20/2018  Review Complete On: 2/20/2018 By: Johnathon Becerril MD  
  
 Severity Noted Reaction Type Reactions Latex  02/12/2013    Other (comments) \"YEAST INFECTION\" Amoxicillin  03/11/2013    Other (comments) Gave pt bad ha's Flagyl [Metronidazole]  11/12/2016    Other (comments) Pt states headaches and vomiting Urised [Meth-hyos-atrp-m Blue-ba-phsal]  10/16/2012    Itching, Swelling Current Immunizations  Never Reviewed No immunizations on file. Not reviewed this visit You Were Diagnosed With   
  
 Codes Comments Closed fracture of distal end of right fibula and tibia with routine healing, subsequent encounter    -  Primary ICD-10-CM: S82.301D, M58.184Q ICD-9-CM: V54.16 Vitals BP Pulse Temp Height(growth percentile) Weight(growth percentile) SpO2  
 121/71 77 98.3 °F (36.8 °C) 5' 6\" (1.676 m) 155 lb 9.6 oz (70.6 kg) 100% BMI OB Status Smoking Status 25.11 kg/m2 Hysterectomy Former Smoker BMI and BSA Data Body Mass Index Body Surface Area  
 25.11 kg/m 2 1.81 m 2 Preferred Pharmacy Pharmacy Name Phone [address] [facility] Jame Ortiz [phone] Your Updated Medication List  
  
   
 This list is accurate as of: 2/20/18  2:45 PM.  Always use your most recent med list.  
  
  
  
  
 alendronate 70 mg tablet Commonly known as:  FOSAMAX Take  by mouth. NORCO 5-325 mg per tablet Generic drug:  HYDROcodone-acetaminophen Take  by mouth. ondansetron 4 mg disintegrating tablet Commonly known as:  ZOFRAN ODT Take 1-2 Tabs by mouth every eight (8) hours as needed. Indications: PREVENTION OF POST-OPERATIVE NAUSEA AND VOMITING  
  
 oxyCODONE-acetaminophen 5-325 mg per tablet Commonly known as:  PERCOCET Take 1 to 2  tablet every 6 hours as needed for pain control. If you were instructed to try over the counter ibuprofen or tylenol, only take the percocet for pain not controlled with the over the counter medication. To-Do List   
 02/26/2018 5:30 PM  
  Appointment with Idalia Turcios at [facility] [address] [place] [address] ([phone])  
  
 03/02/2018 2:00 PM  
  Appointment with Elo Verma PTA at [facility] [address] [place] [address] ([phone]) Patient Instructions Achilles Tendon: Exercises Your Care Instructions Here are some examples of exercises for your achilles tendon. Start each exercise slowly. Ease off the exercise if you start to have pain. Your doctor or physical therapist will tell you when you can start these exercises and which ones will work best for you. How to do the exercises Toe stretch 1. Sit in a chair, and extend your affected leg so that your heel is on the floor. 2. With your hand, reach down and pull your big toe up and back. Pull toward your ankle and away from the floor. 3. Hold the position for at least 15 to 30 seconds. 4. Repeat 2 to 4 times a session, several times a day. Calf-plantar fascia stretch 1. Sit with your legs extended and knees straight. 2. Place a towel around your foot just under the toes. 3. Hold each end of the towel in each hand, with your hands above your knees. 4. Pull back with the towel so that your foot stretches toward you. 5. Hold the position for at least 15 to 30 seconds. 6. Repeat 2 to 4 times a session, up to 5 sessions a day. Floor stretch 1. Stand about 2 feet from a wall, and place your hands on the wall at about shoulder height. Or you can stand behind a chair, placing your hands on the back of it for balance. 2. Step back with the leg you want to stretch. Keep the leg straight, and press your heel into the floor with your toe turned slightly in. 
3. Lean forward, and bend your other leg slightly. Feel the stretch in the Achilles tendon of your back leg. Hold for at least 15 to 30 seconds. 4. Repeat 2 to 4 times a session, up to 5 sessions a day. Stair stretch 1. Stand with the balls of both feet on the edge of a step or curb (or a medium-sized phone book). With at least one hand, hold onto something solid for balance, such as a banister or handrail. 2. Keeping your affected leg straight, slowly let that heel hang down off of the step or curb until you feel a stretch in the back of your calf and/or Achilles area. Some of your weight should still be on the other leg. 3. Hold this position for at least 15 to 30 seconds. 4. Repeat 2 to 4 times a session, up to 5 times a day or whenever your Achilles tendon starts to feel tight. This stretch can also be done with your knee slightly bent. Strength exercise 1. This exercise will get you started on building strength after an Achilles tendon injury. Your doctor or physical therapist can help you move on to more challenging exercises as you heal and get stronger. 2. Stand on a step with your heel off the edge of the step. Hold on to a handrail or wall for balance. 3. Push up on your toes, then slowly count to 10 as you lower yourself back down until your heel is below the step.  If it hurts to push up on your toes, try putting most of your weight on your other foot as you push up, or try using your arms to help you. If you can't do this exercise without causing pain, stop the exercise and talk to your doctor. 4. Repeat the exercise 8 to 12 times, half with the knee straight and half with the knee bent. Follow-up care is a key part of your treatment and safety. Be sure to make and go to all appointments, and call your doctor if you are having problems. It's also a good idea to know your test results and keep a list of the medicines you take. Where can you learn more? Go to http://angela-michelle.info/. Enter R815 in the search box to learn more about \"Achilles Tendon: Exercises. \" Current as of: March 21, 2017 Content Version: 11.4 © 6242-7784 Instablogs. Care instructions adapted under license by [employer] (which disclaims liability or warranty for this information). If you have questions about a medical condition or this instruction, always ask your healthcare professional. [place] [address] any warranty or liability for your use of this information. Ankle: Exercises Your Care Instructions Here are some examples of exercises for your ankle. Start each exercise slowly. Ease off the exercise if you start to have pain. Your doctor or physical therapist will tell you when you can start these exercises and which ones will work best for you. How to do the exercises \"Alphabet\" exercise 5. Trace the alphabet with your toe. This helps your ankle move in all directions. Side-to-side knee swing exercise 7. Sit in a chair with your foot flat on the floor. 8. Slowly move your knee from side to side while keeping your foot pressed flat. 9. Continue this exercise for 2 to 3 minutes. Towel curl 5. While sitting, place your foot on a towel on the floor and scrunch the towel toward you with your toes. 6. Then use your toes to push the towel away from you. 7. Make this exercise more challenging by placing a weighted object, such as a soup can, on the other end of the towel. Towel stretch 5. Sit with your legs extended and knees straight. 6. Place a towel around your foot just under the toes. 7. Hold each end of the towel in each hand, with your hands above your knees. 8. Pull back with the towel so that your foot stretches toward you. 9. Hold the position for at least 15 to 30 seconds. 10. Repeat 2 to 4 times a session, up to 5 sessions a day. Ankle eversion exercise 5. Start by sitting with your foot flat on the floor and pushing it outward against an immovable object such as the wall or heavy furniture. Hold for about 6 seconds, then relax. Repeat 8 to 12 times. 6. After you feel comfortable with this, try using rubber tubing looped around the outside of your feet for resistance. Push your foot out to the side against the tubing, and then count to 10 as you slowly bring your foot back to the middle. Repeat 8 to 12 times. Isometric opposition exercises 1. While sitting, put your feet together flat on the floor. 2. Press your injured foot inward against your other foot. Hold for about 6 seconds, and relax. Repeat 8 to 12 times. 3. Then place the heel of your other foot on top of the injured one. Push down with the top heel while trying to push up with your injured foot. Hold for about 6 seconds, and relax. Repeat 8 to 12 times. Follow-up care is a key part of your treatment and safety. Be sure to make and go to all appointments, and call your doctor if you are having problems. It's also a good idea to know your test results and keep a list of the medicines you take. Where can you learn more? Go to http://angela-michelle.info/. Enter I109 in the search box to learn more about \"Ankle: Exercises. \" Current as of: March 21, 2017 Content Version: 11.4 © 4007-8298 Healthwise[employer] Incorporated.  Care instructions adapted under license by [address] (which disclaims liability or warranty for this information). If you have questions about a medical condition or this instruction, always ask your healthcare professional. [address] any warranty or liability for your use of this information. Patient states that she has a medication for acid reflux at home and does not want us to prescribe the Prilosec. Introducing [facility]! Ervin Gordon introduces [employer] patient portal. Now you can access parts of your medical record, email your doctor's office, and request medication refills online. 1. In your internet browser, go to [email or web address] 2. Click on the First Time User? Click Here link in the Sign In box. You will see the New Member Sign Up page. 3. Enter your [employer] Access Code exactly as it appears below. You will not need to use this code after youve completed the sign-up process. If you do not sign up before the expiration date, you must request a new code. · [employer] Access Code: [email or web address] Expires: 4/16/2018 11:41 AM 
 
4. Enter the last four digits of your Social Security Number (xxxx) and Date of Birth (mm/dd/yyyy) as indicated and click Submit. You will be taken to the next sign-up page. 5. Create a [employer] ID. This will be your [employer] login ID and cannot be changed, so think of one that is secure and easy to remember. 6. Create a [employer] password. You can change your password at any time. 7. Enter your Password Reset Question and Answer. This can be used at a later time if you forget your password. 8. Enter your e-mail address. You will receive e-mail notification when new information is available in [address] 9. Click Sign Up. You can now view and download portions of your medical record. 10. Click the Download Summary menu link to download a portable copy of your medical information. If you have questions, please visit the Frequently Asked Questions section of the [employer] website. Remember, [employer] is NOT to be used for urgent needs. For medical emergencies, dial 911. Now available from your iPhone and Android! Please provide this summary of care documentation to your next provider. Your primary care clinician is listed as Zenobia Mess. If you have any questions after today's visit, please call [phone].

## 2018-02-23 ENCOUNTER — APPOINTMENT (OUTPATIENT)
Dept: PHYSICAL THERAPY | Age: 53
End: 2018-02-23
Payer: COMMERCIAL

## 2018-02-26 ENCOUNTER — APPOINTMENT (OUTPATIENT)
Dept: PHYSICAL THERAPY | Age: 53
End: 2018-02-26
Payer: COMMERCIAL

## 2018-02-26 ENCOUNTER — DOCUMENTATION ONLY (OUTPATIENT)
Dept: ORTHOPEDIC SURGERY | Age: 53
End: 2018-02-26

## 2018-02-28 ENCOUNTER — HOSPITAL ENCOUNTER (OUTPATIENT)
Dept: PHYSICAL THERAPY | Age: 53
End: 2018-02-28
Payer: COMMERCIAL

## 2018-03-02 ENCOUNTER — APPOINTMENT (OUTPATIENT)
Dept: PHYSICAL THERAPY | Age: 53
End: 2018-03-02
Payer: COMMERCIAL

## 2018-03-06 ENCOUNTER — DOCUMENTATION ONLY (OUTPATIENT)
Dept: ORTHOPEDIC SURGERY | Age: 53
End: 2018-03-06

## 2018-03-06 NOTE — PROGRESS NOTES
Pt had paperwork faxed to the [facility] location. Functional Capacity Evaluation forms. Please advise pt @ [phone] when complete.

## 2018-03-09 ENCOUNTER — HOSPITAL ENCOUNTER (OUTPATIENT)
Dept: PHYSICAL THERAPY | Age: 53
Discharge: HOME | End: 2018-03-09
Payer: COMMERCIAL

## 2018-03-09 PROCEDURE — 97530 THERAPEUTIC ACTIVITIES: CPT

## 2018-03-09 NOTE — PROGRESS NOTES
PT DAILY TREATMENT NOTE     Patient Name: Miguel Gallego  Date:3/9/2018  : 1965  [x]  Patient  Verified  Payor: Volodymyr Dye / Plan: [place] [employer]  CAPITATED PT / Product Type: Commerical /    In time:11:00  Out time: 11:40   Total Treatment Time (min): 40  Visit #: 3 of 8    Treatment Area: Pain in right ankle and joints of right foot [M25.571]  Unspecified fracture of shaft of right tibia, initial encounter for closed fracture [S82.201A]    SUBJECTIVE  Pain Level (0-10 scale): 6/10  Any medication changes, allergies to medications, adverse drug reactions, diagnosis change, or new procedure performed?: [x] No    [] Yes (see summary sheet for update)  Subjective functional status/changes:   [] No changes reported  The patient said that she had attended a conference and had to go up and down some stairs which caused her to have some pain. She states she has been working on her exercises. OBJECTIVE  40 min Therapeutic Activity:  X  See flow sheet :  Extensive education regarding exercises provided by Dr. Fifi Pizano. Rationale: education and HEP review with band provision and instruction. to improve the patients ability to perform ADLs with greater ease. With   [] TE   [] TA   [] neuro   [] other: Patient Education: [x] Review HEP    [] Progressed/Changed HEP based on:   [] positioning   [] body mechanics   [] transfers   [] heat/ice application    [] other:      Other Objective/Functional Measures:   Extensively educated the patient regarding her exercises provided by Dr. Fifi Pizano and provided comments regarding recommendations with use of a stationary bike for use during cardio per patient request.      Pain Level (0-10 scale) post treatment: 6/10    ASSESSMENT/Changes in Function: D/C to continue HEP provided by Dr. Fifi Pizano with an upcoming follow-up in 4 weeks.     []  See Plan of Care  []  See progress note/recertification  []  See Discharge Summary         Progress towards goals / Updated goals:  Short Term Goals: To be accomplished in 2 weeks:                         1. Patient will report performance of HEP at least 2 times per day to facilitate improved outcomes and improved self management. Pt reports compliance with HEP 2/12/2018                         2. Pt will demonstrate PROM R ankle DF to 5 degrees or better to improve gait mechanics. Not met -1 degrees from neutral 2/12/2018; 4 degrees DF 2/19/2018  Long Term Goals: To be accomplished in 6 weeks:                         1. Pt will demonstrate R ankle AROM DF to 8 degrees, Inv to 15 degrees, and PF to 45 or better to improve gait mechanics and ease of functional mobility. Progressed; meeting IV and PF, not met DF 2/19/2018                         2. Pt will demonstrate gait void of compensatory hip movement to reduce hip discomfort with ADLs. Not met 2/12/2018                         3. Pt will demonstrate 4/5 global R ankle strength to improve ease of ADLs.  Met 4/5 MMT 2/19/2018                         4. Pt will demonstrate ability to perform eccentric step down on 4 inch step void of compensation to indicate improving functional utilization of ankle mobility during daily Not met 2/19/2018     PLAN  []  Upgrade activities as tolerated     []  Continue plan of care  []  Update interventions per flow sheet       [x]  Discharge due to:_  []  Other:_      Ness Arriaga, PT 3/9/2018  1:10 PM    Future Appointments  Date Time Provider Monique Bullard   3/20/2018 10:40 AM Sonia Burrell MD [facility]

## 2018-03-20 ENCOUNTER — OFFICE VISIT (OUTPATIENT)
Dept: ORTHOPEDIC SURGERY | Age: 53
End: 2018-03-20

## 2018-03-20 ENCOUNTER — TELEPHONE (OUTPATIENT)
Dept: SURGERY | Age: 53
End: 2018-03-20

## 2018-03-20 VITALS
BODY MASS INDEX: 24.91 KG/M2 | SYSTOLIC BLOOD PRESSURE: 111 MMHG | HEIGHT: 66 IN | OXYGEN SATURATION: 100 % | RESPIRATION RATE: 14 BRPM | DIASTOLIC BLOOD PRESSURE: 67 MMHG | WEIGHT: 155 LBS | TEMPERATURE: 97.4 F | HEART RATE: 75 BPM

## 2018-03-20 DIAGNOSIS — M21.41 PES PLANUS OF RIGHT FOOT: ICD-10-CM

## 2018-03-20 DIAGNOSIS — S82.831D CLOSED FRACTURE OF DISTAL END OF RIGHT FIBULA AND TIBIA WITH ROUTINE HEALING, SUBSEQUENT ENCOUNTER: Primary | ICD-10-CM

## 2018-03-20 DIAGNOSIS — S82.301D CLOSED FRACTURE OF DISTAL END OF RIGHT FIBULA AND TIBIA WITH ROUTINE HEALING, SUBSEQUENT ENCOUNTER: Primary | ICD-10-CM

## 2018-03-20 NOTE — PROGRESS NOTES
AMBULATORY PROGRESS NOTE      Patient: Kimberly Germain             MRN: [id number]     SSN: [id number] Body mass index is 25.02 kg/(m^2). YOB: 1965     AGE: 45 y.o. EX: female    PCP: Ernie Barker MD       IMPRESSION/DIAGNOSIS AND TREATMENT PLAN      DIAGNOSES  1. Closed fracture of distal end of right fibula and tibia with routine healing, subsequent encounter    2. Pes planus of right foot        Orders Placed This Encounter    AMB SUPPLY ORDER    [60104] Ankle Min 3V      Kimberly Germain understands her diagnoses and the proposed plan. PLAN //  HPI AND EXAMINATION      Kimberly Germain IS A 45 y.o. female who presents to my outpatient office for evaluation of:     My plan for her is modified activities. We will reassess her in about one month or so. I did complete some paperwork for her for unemployment. 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 So, she does not have a job at this current time. I addressed a few things:    1. She reports having some discomfort to the medial portion of her right hindfoot. When she stands, it feels like the right foot wants to go inward. In examining her, I think she has posterior tendinitis. Recommendation is for a medially-posted orthotic. 2. I also addressed a few other concerns she had. She is not yet at [address] [place] [address]. We will recommend a home exercise program.  No more supervised physical therapy, and we will reassess her in one months time. On examination, the patient is alert and follows commands. She is in no acute distress. Her affect and mood are appropriate. Her right foot and ankle areas are examined.      Visit Vitals    /67    Pulse 75    Temp 97.4 °F (36.3 °C)    Resp 14    Ht 5' 6\" (1.676 m)    Wt 155 lb (70.3 kg)    SpO2 100%    BMI 25.02 kg/m2     On examination 3/20/2018 , the patient is alert and follows commands. She is in no acute distress. Her affect and mood are appropriate. She has swelling of her right foot and ankle distally in the extraarticular region with some slight pitting. Her calf is soft and nontender. External rotation stress test is negative for tenderness. Peripheral pulses are intact. Toes are nice and warm and pink. There are no signs of DVT or thrombophlebitis. She walks with just a very slight limp at this current time.        CHART REVIEW      Past Medical History:   Diagnosis Date    Colon polyps     Constipation     Heart murmur     Hemorrhoids     Mitral valve prolapse     Nausea & vomiting     \"VIOLENTLY SICK AFTER TUBAL LIGATION\"    Plantar fasciitis     Vestibular neuritis      Current Outpatient Prescriptions   Medication Sig    HYDROcodone-acetaminophen (NORCO) 5-325 mg per tablet Take  by mouth.  meloxicam (MOBIC) 15 mg tablet Take 1 Tab by mouth daily (with breakfast).  alendronate (FOSAMAX) 70 mg tablet Take  by mouth.  oxyCODONE-acetaminophen (PERCOCET) 5-325 mg per tablet Take 1 to 2  tablet every 6 hours as needed for pain control. If you were instructed to try over the counter ibuprofen or tylenol, only take the percocet for pain not controlled with the over the counter medication.  ondansetron (ZOFRAN ODT) 4 mg disintegrating tablet Take 1-2 Tabs by mouth every eight (8) hours as needed. Indications: PREVENTION OF POST-OPERATIVE NAUSEA AND VOMITING     No current facility-administered medications for this visit.       Allergies   Allergen Reactions    Latex Other (comments)     \"YEAST INFECTION\"    Amoxicillin Other (comments)     Gave pt bad ha's    Flagyl [Metronidazole] Other (comments)     Pt states headaches and vomiting      Urised [Meth-Hyos-Atrp-M Blue-Ba-Phsal] Itching and Swelling     Past Surgical History:   Procedure Laterality Date    HX ANKLE FRACTURE TX      HX BREAST LUMPECTOMY      HX COLONOSCOPY      2010    HX CYST REMOVAL  2010    right breast    HX GYN      hysterectomy    HX HEENT      tonselectomy    HX HEENT      lasik eye surgery    HX OTHER SURGICAL      cystoscopy    HX OTHER SURGICAL  2010    right ovary removed    HX TUBAL LIGATION       Social History     Occupational History    Not on file. Social History Main Topics    Smoking status: Former Smoker    Smokeless tobacco: Never Used    Alcohol use No    Drug use: No    Sexual activity: Not on file     Family History   Problem Relation Age of Onset    Cancer Sister      colon    Cancer Father      colon    Cancer Maternal Grandmother      colon        REVIEW OF SYSTEMS : 3/20/2018  ALL BELOW ARE Negative except : SEE HPI      Constitutional: Negative for fever, chills and weight loss. Neg Weight Loss  Cardiovascular: Negative for chest pain, claudication and leg swelling. SOB, PORTER   Gastrointestinal/Urological: Negative for pain, N/V/D/C, Blood in stool or urine,dysuria,  Hematuria, Incontinence  Endocrine: See HPI  Skin: see HPI  Musculoskeletal: see HPI. Neurological: Negative for dizziness and weakness, headaches,Visual Changes or   Confusion, or Seizures,   Psychiatric/Behavioral: Negative for depression, memory loss and substance abuse. Extremities:  Negative for hair changes, rash or skin lesion changes. Hematologic: Negative for Bleeding problems, bruising, pallor or swollen lymph nodes. Peripheral Vascular: No calf pain, vascular vein tenderness to calf pain              No calf throbbing, posterior knee throbbing pain     DIAGNOSTIC IMAGING        Dictation on: 03/20/2018  1:08 PM by: Lissette Archer [[id number]]          Please see above section of this report. I have personally reviewed the results of the above study. The interpretation of this study is my professional opinion.       Arely Swenson MD  3/20/2018  1:09 PM

## 2018-03-20 NOTE — LETTER
NOTIFICATION RETURN TO WORK / SCHOOL 
 
3/20/2018 12:22 PM 
 
Ms. Gabriela Juarez 
[address] [facility] 
[address] [place] [phone] To Whom It May Concern: 
 
Gabriela Juarez is currently under the care of [facility]. She will remain out of work until her follow up appointment. If there are questions or concerns please have the patient contact our office.  
 
 
 
Sincerely, 
 
 
Gricelda Zhou MD

## 2018-03-20 NOTE — PROGRESS NOTES
Patient is here today for a follow up on her right ankle.    Orthotic for the right foot  Follow up as scheduled

## 2018-03-20 NOTE — PROCEDURES
X-rays of the right ankle, three views, AP, lateral, and oblique, show healed distal tibia and fibula fractures. There is some generalized osteopenia seen to the distal part of the tibia and osteopenia to the talus, just slight. Otherwise, there are no acute fracture, subluxation, or dislocation. No osteolytic or osteoblastic lesions are seen to the ankle. On the three views of the right ankle, the fibula hardware is in good position. The tibia hardware is in good position as well.

## 2018-03-20 NOTE — MR AVS SNAPSHOT
[address], [address] 706 [facility] 
[phone] Patient: Chris Gardner MRN: [id number] YZX:[id number] Visit Information Date & Time Provider Department Dept. Phone Encounter #  
 3/20/2018 10:40 AM Darcie Arvizu MD [facility] [phone] [id number] Upcoming Health Maintenance Date Due Hepatitis C Screening 1965 DTaP/Tdap/Td series (1 - Tdap) 7/30/1984 BREAST CANCER SCRN MAMMOGRAM 7/30/2013 FOBT Q 1 YEAR AGE 50-75 7/30/2013 PAP AKA CERVICAL CYTOLOGY 2/1/2016 Influenza Age 5 to Adult 8/1/2017 Allergies as of 3/20/2018  Review Complete On: 3/20/2018 By: Iggy Ellington Severity Noted Reaction Type Reactions Latex  02/12/2013    Other (comments) \"YEAST INFECTION\" Amoxicillin  03/11/2013    Other (comments) Gave pt bad ha's Flagyl [Metronidazole]  11/12/2016    Other (comments) Pt states headaches and vomiting Urised [Meth-hyos-atrp-m Blue-ba-phsal]  10/16/2012    Itching, Swelling Current Immunizations  Never Reviewed No immunizations on file. Not reviewed this visit You Were Diagnosed With   
  
 Codes Comments Closed fracture of distal end of right fibula and tibia with routine healing, subsequent encounter    -  Primary ICD-10-CM: S82.301D, T25.502G ICD-9-CM: V54.16 Pes planus of right foot     ICD-10-CM: M21.41 
ICD-9-CM: 164 Vitals BP Pulse Temp Resp Height(growth percentile) Weight(growth percentile) 111/67 75 97.4 °F (36.3 °C) 14 5' 6\" (1.676 m) 155 lb (70.3 kg) SpO2 BMI OB Status Smoking Status 100% 25.02 kg/m2 Hysterectomy Former Smoker Vitals History BMI and BSA Data Body Mass Index Body Surface Area 25.02 kg/m 2 1.81 m 2 Preferred Pharmacy Pharmacy Name Phone  [facility] [address] [facility], [address] [place] [address] [phone] Your Updated Medication List  
  
   
This list is accurate as of 3/20/18 12:25 PM.  Always use your most recent med list.  
  
  
  
  
 alendronate 70 mg tablet Commonly known as:  FOSAMAX Take  by mouth.  
  
 meloxicam 15 mg tablet Commonly known as:  MOBIC Take 1 Tab by mouth daily (with breakfast). NORCO 5-325 mg per tablet Generic drug:  HYDROcodone-acetaminophen Take  by mouth. ondansetron 4 mg disintegrating tablet Commonly known as:  ZOFRAN ODT Take 1-2 Tabs by mouth every eight (8) hours as needed. Indications: PREVENTION OF POST-OPERATIVE NAUSEA AND VOMITING  
  
 oxyCODONE-acetaminophen 5-325 mg per tablet Commonly known as:  PERCOCET Take 1 to 2  tablet every 6 hours as needed for pain control. If you were instructed to try over the counter ibuprofen or tylenol, only take the percocet for pain not controlled with the over the counter medication. We Performed the Following AMB POC XRAY, ANKLE; COMPLETE, 3+ VIE [25666 CPT(R)] AMB SUPPLY ORDER [[id number] Custom] Comments:  
 Order date: 3/20/2018 Custom Trilam Orthotic right Medially posted Introducing [facility]! Dear Levi Gallo: Thank you for requesting a [employer] account. Our records indicate that you already have an active [employer] account. You can access your account anytime at [email or web address] Did you know that you can access your hospital and ER discharge instructions at any time in [employer]? You can also review all of your test results from your hospital stay or ER visit. Additional Information If you have questions, please visit the Frequently Asked Questions section of the [employer] website at [email or web address] Remember, [employer] is NOT to be used for urgent needs. For medical emergencies, dial 911. Now available from your iPhone and Android! Please provide this summary of care documentation to your next provider. Your primary care clinician is listed as Nando Meneses. If you have any questions after today's visit, please call [phone].

## 2018-03-21 ENCOUNTER — TELEPHONE (OUTPATIENT)
Dept: ORTHOPEDIC SURGERY | Age: 53
End: 2018-03-21

## 2018-03-21 NOTE — TELEPHONE ENCOUNTER
Giles Cotton from [facility] called in requesting to get some clarification from Dr. Harish Gray about a form that was filled out. Giles Cotton says that anyone that answers in her department can help the office out they just need patients first and last name,   last four of social.   Giles Cotton stated they need clarification on the dates the patient was seen for 2017 & 2018. She also stated they are questioning because 2017 dates seem to be written in different ink and not the same hand writting then what Dr. Harish Gray wrote for 2018 and his signature. They are making sure patient has not filled it in after Dr. Harish Gray.

## 2018-03-23 NOTE — PROGRESS NOTES
[facility] [address] Marjorie Crook [address]  [place], [address]  [phone] fax    Physical Therapy Discharge Summary  Patient name: Tiarra Rolle Start of Care: 2018   Referral source: Montez Cross MD : 1965                          Medical Diagnosis: Pain in right ankle and joints of right foot [M25.571]  Unspecified fracture of shaft of right tibia, initial encounter for closed fracture [S82.201A] Onset Date:2017                          Treatment Diagnosis: s/p R ankle ORIF with limited mobility   Prior Hospitalization: see medical history Provider#: [id number]   Medications: Verified on Patient summary List    Comorbidities: Latex allergy, Hypotension, osteopenia   Prior Level of Function: relatively unlimited with daily activity, some limitations 2/2 plantar fasciitis  Visits from Start of Care: 7    Missed Visits: 1  Reporting Period : 2018 to 3/09/2018    Summary of Care:    Short Term Goals: To be accomplished in 2 weeks:                         1. Patient will report performance of HEP at least 2 times per day to facilitate improved outcomes and improved self management. Pt reports compliance with HEP 2018                         2. Pt will demonstrate PROM R ankle DF to 5 degrees or better to improve gait mechanics. Not met -1 degrees from neutral 2018; 4 degrees DF 2018  Long Term Goals: To be accomplished in 6 weeks:                         1. Pt will demonstrate R ankle AROM DF to 8 degrees, Inv to 15 degrees, and PF to 45 or better to improve gait mechanics and ease of functional mobility. Progressed; meeting IV and PF, not met DF 2018                         2. Pt will demonstrate gait void of compensatory hip movement to reduce hip discomfort with ADLs. Not met 2018                         3. Pt will demonstrate 4/5 global R ankle strength to improve ease of ADLs.  Met 4/5 MMT 2/19/2018                         4. Pt will demonstrate ability to perform eccentric step down on 4 inch step void of compensation to indicate improving functional utilization of ankle mobility during daily Not met 2/19/2018    Extensive review and instruction provided to patient regarding her HEP provided by Dr. Binu Lau with an upcoming follow-up in 4 weeks. D/C from PT services to continue HEP.     ASSESSMENT/RECOMMENDATIONS:  [x]Discontinue therapy: []Patient has reached or is progressing toward set goals      []Patient is non-compliant or has abdicated      [x]Due to lack of appreciable progress towards set [address], PT 3/23/2018 10:13 AM

## 2018-03-23 NOTE — TELEPHONE ENCOUNTER
I spoke with Ms. Pat Jerez (patient) and she is going to fax over the paperwork so that Dr. Alli Diaz can make a note in regards to the dates of service. Patient did input her own dates that she was previously seen by another physician prior to Dr. Alli Diaz taking over her care.

## 2018-03-26 NOTE — TELEPHONE ENCOUNTER
Dr. Kalyn Proctor has added a hand written addendum to the disability form. I faxed it to the number provided and confirmation has been received.

## 2018-04-17 ENCOUNTER — OFFICE VISIT (OUTPATIENT)
Dept: ORTHOPEDIC SURGERY | Age: 53
End: 2018-04-17

## 2018-04-17 VITALS
OXYGEN SATURATION: 100 % | HEART RATE: 65 BPM | HEIGHT: 66 IN | BODY MASS INDEX: 24.88 KG/M2 | WEIGHT: 154.8 LBS | DIASTOLIC BLOOD PRESSURE: 77 MMHG | SYSTOLIC BLOOD PRESSURE: 116 MMHG | TEMPERATURE: 96.6 F | RESPIRATION RATE: 16 BRPM

## 2018-04-17 DIAGNOSIS — S16.1XXA STRAIN OF NECK MUSCLE, INITIAL ENCOUNTER: ICD-10-CM

## 2018-04-17 DIAGNOSIS — S82.301D CLOSED FRACTURE OF DISTAL END OF RIGHT FIBULA AND TIBIA WITH ROUTINE HEALING, SUBSEQUENT ENCOUNTER: Primary | ICD-10-CM

## 2018-04-17 DIAGNOSIS — S82.831D CLOSED FRACTURE OF DISTAL END OF RIGHT FIBULA AND TIBIA WITH ROUTINE HEALING, SUBSEQUENT ENCOUNTER: Primary | ICD-10-CM

## 2018-04-17 RX ORDER — AA/PROT/LYSINE/METHIO/VIT C/B6 50-12.5 MG
TABLET ORAL
COMMUNITY
End: 2021-07-30

## 2018-04-17 RX ORDER — MELATONIN
DAILY
COMMUNITY
End: 2018-09-13

## 2018-04-17 RX ORDER — MELOXICAM 15 MG/1
15 TABLET ORAL
Qty: 30 TAB | Refills: 0 | Status: SHIPPED | OUTPATIENT
Start: 2018-04-17 | End: 2018-06-05 | Stop reason: SDUPTHER

## 2018-04-17 RX ORDER — AMPICILLIN TRIHYDRATE 250 MG
600 CAPSULE ORAL
COMMUNITY
End: 2021-07-30

## 2018-04-17 RX ORDER — FAMOTIDINE 40 MG/1
40 TABLET, FILM COATED ORAL DAILY
Qty: 30 TAB | Refills: 0 | Status: SHIPPED | OUTPATIENT
Start: 2018-04-17 | End: 2018-09-13

## 2018-04-17 NOTE — PROGRESS NOTES
AMBULATORY PROGRESS NOTE      Patient: Alex Murdock             MRN: [id number]     SSN: [id number] Body mass index is 24.99 kg/(m^2). YOB: 1965     AGE: 45 y.o. EX: female    PCP: Adrianna Escoto MD    IMPRESSION/DIAGNOSIS AND TREATMENT PLAN     DIAGNOSES  1. Closed fracture of distal end of right fibula and tibia with routine healing, subsequent encounter        Orders Placed This Encounter    [[id number]] Ankle Min 3V    meloxicam (MOBIC) 15 mg tablet    famotidine (PEPCID) 40 mg tablet      Alex Murdock understands her diagnoses and the proposed plan. Plan:    1) Continue supplementing Calcium and Vitamin D. 2) Continue HEP with Theraband. 3) Gradually increase exercise regimen as tolerated. 4) Mobic 15 m PO every day; 30 tablets, 0 refills. 5) Pepcid 40 m PO every day; 30 tablets, 0 refills. 6) Temporary DMV Placard. RTO - 3 months    HPI AND EXAMINATION     Alex Murdock IS A 45 y.o. female who presents to my outpatient office for follow up of a closed fracture of the distal end of the right fibula, and pes planus of right foot. At last visit, I provided a HEP. Since we saw her last, Ms. Ramiro Ruffin has continued to do the HEP with the Theraband which has provided an increase in her ROM in her right ankle. She continues to experience swelling along the right ankle and reports having tenderness along the lateral aspect. Patient has been supplementing calcium and Vitamin D as previously directed and has been instructed to continue doing so. Patient was unable to obtain the trilaminar orthotics due to the cost, and she tried asking her podiatrist to make the modifications to her previous inserts. The provider put a metatarsal pad in her inserts, however she requires medial posting for her inserts. Additionally, Ms. Ramiro Ruffin reports she has experienced cracking or popping with certain motions of her neck that has occurred over several months.  She reports having full ROM. Of note, she states it is not painful. Patient denies h/o GI complication or bariatric procedures. Right ANKLE and FOOT       Gait: slow  Tenderness: mild tenderness along the lateral malleolus. Cutaneous: No rashes, skin patches, wounds, or abrasions to the lower legs           Warm and Normal color. No regions of expressible drainage. Medial Border of Tibia Region: absent           Skin color, texture, turgor normal. Normal.           Well healed surgical scar along the anterior ankle and tibia. Joint Motion: ROM Ankle:Decreased , Hindfoot: Limited inversion, full eversion}, Forefoot toes:Normal  Neurologic Exam: Neuro: Motor: normal 5/5 strength in all tested muscle groups and Sensory : no sensory deficits noted. Contractures: Gastrocnemius or Achilles Contractures absent  Joint / Tendon Stability: No Ankle or Subtalar instability or joint laxity. No peroneal sublux ability or dislocation  Alignment: Slight Pes Planus  Vascular: Normal Pulses/ NL Capillary refill, No evidence of DVT seen on physical exam.   No calf swelling, no tenderness to calf muscles. Lymphatic:  No Evidence of Lymphedema. Neck: CERVICAL SPINE   Muscular tenderness not present   Passive range of motion within normal limits. Active range of motion within normal limits   Neck muscles supple. Spasm of muscles not present. No JVD present. No tracheal deviation, no erythema and normal range of motion    Present. No thyromegaly present. Lymphadenopathy:         No cervical adenopathy. No spinous process tenderness present. No rigidity. No edema and no erythema present.      CHART REVIEW     Past Medical History:   Diagnosis Date    Colon polyps     Constipation     Heart murmur     Hemorrhoids     Mitral valve prolapse     Nausea & vomiting     \"VIOLENTLY SICK AFTER TUBAL LIGATION\"    Plantar fasciitis     Vestibular neuritis      Current Outpatient Prescriptions   Medication Sig  coenzyme q10 (CO Q-10) 10 mg cap Take  by mouth.  red yeast rice extract 600 mg cap Take 600 mg by mouth now.  cholecalciferol (VITAMIN D3) 1,000 unit tablet Take  by mouth daily.  calcium-cholecalciferol, d3, (CALCIUM 600 + D) 600-125 mg-unit tab Take  by mouth.  meloxicam (MOBIC) 15 mg tablet Take 1 Tab by mouth daily (with breakfast).  famotidine (PEPCID) 40 mg tablet Take 1 Tab by mouth daily.  meloxicam (MOBIC) 15 mg tablet Take 1 Tab by mouth daily (with breakfast).  alendronate (FOSAMAX) 70 mg tablet Take  by mouth.  HYDROcodone-acetaminophen (NORCO) 5-325 mg per tablet Take  by mouth. No current facility-administered medications for this visit. Allergies   Allergen Reactions    Latex Other (comments)     \"YEAST INFECTION\"    Amoxicillin Other (comments)     Gave pt bad ha's    Flagyl [Metronidazole] Other (comments)     Pt states headaches and vomiting      Urised [Meth-Hyos-Atrp-M Blue-Ba-Phsal] Itching and Swelling     Past Surgical History:   Procedure Laterality Date    HX ANKLE FRACTURE TX      HX BREAST LUMPECTOMY      HX COLONOSCOPY      2010    HX CYST REMOVAL  2010    right breast    HX GYN      hysterectomy    HX HEENT      tonselectomy    HX HEENT      lasik eye surgery    HX OTHER SURGICAL      cystoscopy    HX OTHER SURGICAL  2010    right ovary removed    HX TUBAL LIGATION       Social History     Occupational History    Not on file.      Social History Main Topics    Smoking status: Never Smoker    Smokeless tobacco: Never Used    Alcohol use Yes      Comment: OCCASIONALLY    Drug use: No    Sexual activity: Not on file     Family History   Problem Relation Age of Onset    Cancer Sister      colon    Cancer Father      colon    Cancer Maternal Grandmother      colon    Hypertension Mother     Cancer Mother        REVIEW OF SYSTEMS : 4/17/2018  ALL BELOW ARE Negative except : SEE HPI       Constitutional: Negative for fever, chills and weight loss. Neg Weigh Loss  Cardiovascular: Negative for chest pain, claudication and leg swelling. SOB, PORTER   Gastrointestinal: Negative for  pain, N/V/D/C, Blood in stool or urine,dysuria, hematuria,        Incontinence, pelvic pain  Musculoskeletal: see HPI. Neurological: Negative for dizziness and weakness. Negative for headaches,Visual Changes, Confusion, Seizures,   Psychiatric/Behavioral: Negative for depression, memory loss and substance abuse. Extremities:  Negative for  hair changes, rash or skin lesion changes. Hematologic: Negative for Bleeding problems, bruising, pallor or swollen lymph nodes. Peripheral Vascular: No calf pain, vascular vein tenderness to calf pain              No calf throbbing, posterior knee throbbing pain    DIAGNOSTIC IMAGING      Dictation on: 04/17/2018  3:37 PM by: Alyssa Murray [[id number]]         Written by Willie Geronimo, as dictated by Alyson Brumfield MD. I, , Alyson Brumfield MD, confirm that all documentation is accurate.

## 2018-04-17 NOTE — PROCEDURES
X-rays, three views of the right ankle reveal a healed right tibia and fibular fracture. There is some generalized osteopenia to the ankle. Otherwise, I see no new fractures, subluxation or dislocation. Impression is healed fibula and distal tibia or pilon fracture.

## 2018-04-17 NOTE — PATIENT INSTRUCTIONS
Please follow up in 3 months. You are advised to contact us if your condition worsens. Ankle Sprain: Rehab Exercises  Your Care Instructions  Here are some examples of typical rehabilitation exercises for your condition. Start each exercise slowly. Ease off the exercise if you start to have pain. Your doctor or physical therapist will tell you when you can start these exercises and which ones will work best for you. How to do the exercises  \"Alphabet\" exercise    1. Trace the alphabet with your toe. This helps your ankle move in all directions. Side-to-side knee swing exercise    1. Sit in a chair with your foot flat on the floor. 2. Slowly move your knee from side to side. Keep your foot pressed flat. 3. Continue this exercise for 2 to 3 minutes. Towel curl    1. While sitting, place your foot on a towel on the floor. Scrunch the towel toward you with your toes. 2. Then use your toes to push the towel away from you. 3. To make this exercise more challenging you can put something on the other end of the towel. A can of soup is about the right weight for this. Towel stretch    1. Sit with your legs extended and knees straight. 2. Place a towel around your foot just under the toes. 3. Hold each end of the towel in each hand, with your hands above your knees. 4. Pull back with the towel so that your foot stretches toward you. 5. Hold the position for at least 15 to 30 seconds. 6. Repeat 2 to 4 times a session. Do up to 5 sessions a day. Ankle eversion exercise    1. Start by sitting with your foot flat on the floor. Push your foot outward against a wall or a piece of furniture that doesn't move. Hold for about 6 seconds, and relax. Repeat 8 to 12 times. 2. After you feel comfortable with this, try using rubber tubing looped around the outside of your feet for resistance. Push your foot out to the side against the tubing, and then count to 10 as you slowly bring your foot back to the middle.  Repeat 8 to 12 times. Isometric opposition exercises    1. While sitting, put your feet together flat on the floor. 2. Press your injured foot inward against your other foot. Hold for about 6 seconds, and relax. Repeat 8 to 12 times. 3. Then place the heel of your other foot on top of the injured one. Push down with the top heel while trying to push up with your injured foot. Hold for about 6 seconds, and relax. Repeat 8 to 12 times. Resisted ankle inversion    1. Sit on the floor with your good leg crossed over your other leg. 2. Hold both ends of an exercise band and loop the band around the inside of your affected foot. Then press your other foot against the band. 3. Keeping your legs crossed, slowly push your affected foot against the band so that foot moves away from your other foot. Then slowly relax. 4. Repeat 8 to 12 times. Resisted ankle eversion    1. Sit on the floor with your legs straight. 2. Hold both ends of an exercise band and loop the band around the outside of your affected foot. Then press your other foot against the band. 3. Keeping your leg straight, slowly push your affected foot outward against the band and away from your other foot without letting your leg rotate. Then slowly relax. 4. Repeat 8 to 12 times. Resisted ankle dorsiflexion    1. Tie the ends of an exercise band together to form a loop. Attach one end of the loop to a secure object or shut a door on it to hold it in place. (Or you can have someone hold one end of the loop to provide resistance.)  2. While sitting on the floor or in a chair, loop the other end of the band over the top of your affected foot. 3. Keeping your knee and leg straight, slowly flex your foot to pull back on the exercise band, and then slowly relax. 4. Repeat 8 to 12 times. Single-leg balance    1. Stand on a flat surface with your arms stretched out to your sides like you are making the letter \"T. \" Then lift your good leg off the floor, bending it at the knee. If you are not steady on your feet, use one hand to hold on to a chair, counter, or wall. 2. Standing on the leg with your affected ankle, keep that knee straight. Try to balance on that leg for up to 30 seconds. Then rest for up to 10 seconds. 3. Repeat 6 to 8 times. 4. When you can balance on your affected leg for 30 seconds with your eyes open, try to balance on it with your eyes closed. 5. When you can do this exercise with your eyes closed for 30 seconds and with ease and no pain, try standing on a pillow or piece of foam, and repeat steps 1 through 4. Follow-up care is a key part of your treatment and safety. Be sure to make and go to all appointments, and call your doctor if you are having problems. It's also a good idea to know your test results and keep a list of the medicines you take. Where can you learn more? Go to http://angela-michelle.info/. Eleonora Baez in the search box to learn more about \"Ankle Sprain: Rehab Exercises. \"  Current as of: March 21, 2017  Content Version: 11.4  © 4565-8198 [employer]. Care instructions adapted under license by [employer] (which disclaims liability or warranty for this information). If you have questions about a medical condition or this instruction, always ask your healthcare professional. [address] any warranty or liability for your use of this information. Neck Arthritis: Exercises  Your Care Instructions  Here are some examples of typical rehabilitation exercises for your condition. Start each exercise slowly. Ease off the exercise if you start to have pain. Your doctor or physical therapist will tell you when you can start these exercises and which ones will work best for you. How to do the exercises  Neck stretches to the side    2. This stretch works best if you keep your shoulder down as you lean away from it.  To help you remember to do this, start by relaxing your shoulders and lightly holding on to your thighs or your chair. 3. Tilt your head toward your shoulder and hold for 15 to 30 seconds. Let the weight of your head stretch your muscles. 4. Repeat 2 to 4 times toward each shoulder. Chin tuck    4. Lie on the floor with a rolled-up towel under your neck. Your head should be touching the floor. 5. Slowly bring your chin toward your chest.  6. Hold for a count of 6, and then relax for up to 10 seconds. 7. Repeat 8 to 12 times. Active cervical rotation    4. Sit in a firm chair, or stand up straight. 5. Keeping your chin level, turn your head to the right, and hold for 15 to 30 seconds. 6. Turn your head to the left and hold for 15 to 30 seconds. 7. Repeat 2 to 4 times to each side. Shoulder blade squeeze    7. While standing, squeeze your shoulder blades together. 8. Do not raise your shoulders up as you are squeezing. 9. Hold for 6 seconds. 10. Repeat 8 to 12 times. Shoulder rolls    3. Sit comfortably with your feet shoulder-width apart. You can also do this exercise standing up. 4. Roll your shoulders up, then back, and then down in a smooth, circular motion. 5. Repeat 2 to 4 times. Follow-up care is a key part of your treatment and safety. Be sure to make and go to all appointments, and call your doctor if you are having problems. It's also a good idea to know your test results and keep a list of the medicines you take. Where can you learn more? Go to http://angela-michelle.info/. Enter G210 in the search box to learn more about \"Neck Arthritis: Exercises. \"  Current as of: March 21, 2017  Content Version: 11.4  © 4691-6889 [employer], [employer]. Care instructions adapted under license by [employer] (which disclaims liability or warranty for this information).  If you have questions about a medical condition or this instruction, always ask your healthcare professional. Anjel Deleon disclaims any warranty or liability for your use of this information.

## 2018-04-17 NOTE — MR AVS SNAPSHOT
[address] [place] [address], [address] 200 [facility] 
[phone] Patient: Antonio Presley MRN: [id number] QVA:[id number]/21/[id number] Visit Information Date & Time Provider Department Dept. Phone Encounter #  
 4/17/2018  1:40 PM Ruperto Arroyo MD [facility] [phone] Upcoming Health Maintenance Date Due Hepatitis C Screening 1965 DTaP/Tdap/Td series (1 - Tdap) 7/30/1984 BREAST CANCER SCRN MAMMOGRAM 7/30/2013 FOBT Q 1 YEAR AGE 50-75 7/30/2013 PAP AKA CERVICAL CYTOLOGY 2/1/2016 Influenza Age 5 to Adult 8/1/2017 Allergies as of 4/17/2018  Review Complete On: 3/20/2018 By: Ruperto Arroyo MD  
  
 Severity Noted Reaction Type Reactions Latex  02/12/2013    Other (comments) \"YEAST INFECTION\" Amoxicillin  03/11/2013    Other (comments) Gave pt bad ha's Flagyl [Metronidazole]  11/12/2016    Other (comments) Pt states headaches and vomiting Urised [Meth-hyos-atrp-m Blue-ba-phsal]  10/16/2012    Itching, Swelling Current Immunizations  Never Reviewed No immunizations on file. Not reviewed this visit You Were Diagnosed With   
  
 Codes Comments Closed fracture of distal end of right fibula and tibia with routine healing, subsequent encounter    -  Primary ICD-10-CM: S82.301D, H21.438M ICD-9-CM: V54.16 Vitals BP Pulse Temp Resp Height(growth percentile) Weight(growth percentile) 116/77 65 96.6 °F (35.9 °C) (Oral) 16 5' 6\" (1.676 m) 154 lb 12.8 oz (70.2 kg) SpO2 BMI OB Status Smoking Status 100% 24.99 kg/m2 Hysterectomy Never Smoker BMI and BSA Data Body Mass Index Body Surface Area 24.99 kg/m 2 1.81 m 2 Preferred Pharmacy Pharmacy Name Phone [facility] [address] [phone] Your Updated Medication List  
  
   
 This list is accurate as of 4/17/18  3:12 PM.  Always use your most recent med list.  
  
  
  
  
 alendronate 70 mg tablet Commonly known as:  FOSAMAX Take  by mouth. CALCIUM 600 + D 600-125 mg-unit Tab Generic drug:  calcium-cholecalciferol (d3) Take  by mouth. CO Q-10 10 mg Cap Generic drug:  coenzyme q10 Take  by mouth.  
  
 meloxicam 15 mg tablet Commonly known as:  MOBIC Take 1 Tab by mouth daily (with breakfast). NORCO 5-325 mg per tablet Generic drug:  HYDROcodone-acetaminophen Take  by mouth. red yeast rice extract 600 mg Cap Take 600 mg by mouth now. VITAMIN D3 1,000 unit tablet Generic drug:  cholecalciferol Take  by mouth daily. We Performed the Following AMB POC XRAY, ANKLE; COMPLETE, 3+ VIE [89835 CPT(R)] Patient Instructions Please follow up in 3 months. You are advised to contact us if your condition worsens. Ankle Sprain: Rehab Exercises Your Care Instructions Here are some examples of typical rehabilitation exercises for your condition. Start each exercise slowly. Ease off the exercise if you start to have pain. Your doctor or physical therapist will tell you when you can start these exercises and which ones will work best for you. How to do the exercises \"Alphabet\" exercise 1. Trace the alphabet with your toe. This helps your ankle move in all directions. Side-to-side knee swing exercise 1. Sit in a chair with your foot flat on the floor. 2. Slowly move your knee from side to side. Keep your foot pressed flat. 3. Continue this exercise for 2 to 3 minutes. Towel curl 1. While sitting, place your foot on a towel on the floor. Scrunch the towel toward you with your toes. 2. Then use your toes to push the towel away from you. 3. To make this exercise more challenging you can put something on the other end of the towel. A can of soup is about the right weight for this. Towel stretch 1. Sit with your legs extended and knees straight. 2. Place a towel around your foot just under the toes. 3. Hold each end of the towel in each hand, with your hands above your knees. 4. Pull back with the towel so that your foot stretches toward you. 5. Hold the position for at least 15 to 30 seconds. 6. Repeat 2 to 4 times a session. Do up to 5 sessions a day. Ankle eversion exercise 1. Start by sitting with your foot flat on the floor. Push your foot outward against a wall or a piece of furniture that doesn't move. Hold for about 6 seconds, and relax. Repeat 8 to 12 times. 2. After you feel comfortable with this, try using rubber tubing looped around the outside of your feet for resistance. Push your foot out to the side against the tubing, and then count to 10 as you slowly bring your foot back to the middle. Repeat 8 to 12 times. Isometric opposition exercises 1. While sitting, put your feet together flat on the floor. 2. Press your injured foot inward against your other foot. Hold for about 6 seconds, and relax. Repeat 8 to 12 times. 3. Then place the heel of your other foot on top of the injured one. Push down with the top heel while trying to push up with your injured foot. Hold for about 6 seconds, and relax. Repeat 8 to 12 times. Resisted ankle inversion 1. Sit on the floor with your good leg crossed over your other leg. 2. Hold both ends of an exercise band and loop the band around the inside of your affected foot. Then press your other foot against the band. 3. Keeping your legs crossed, slowly push your affected foot against the band so that foot moves away from your other foot. Then slowly relax. 4. Repeat 8 to 12 times. Resisted ankle eversion 1. Sit on the floor with your legs straight. 2. Hold both ends of an exercise band and loop the band around the outside of your affected foot. Then press your other foot against the band. 3. Keeping your leg straight, slowly push your affected foot outward against the band and away from your other foot without letting your leg rotate. Then slowly relax. 4. Repeat 8 to 12 times. Resisted ankle dorsiflexion 1. Tie the ends of an exercise band together to form a loop. Attach one end of the loop to a secure object or shut a door on it to hold it in place. (Or you can have someone hold one end of the loop to provide resistance.) 2. While sitting on the floor or in a chair, loop the other end of the band over the top of your affected foot. 3. Keeping your knee and leg straight, slowly flex your foot to pull back on the exercise band, and then slowly relax. 4. Repeat 8 to 12 times. Single-leg balance 1. Stand on a flat surface with your arms stretched out to your sides like you are making the letter \"T. \" Then lift your good leg off the floor, bending it at the knee. If you are not steady on your feet, use one hand to hold on to a chair, counter, or wall. 2. Standing on the leg with your affected ankle, keep that knee straight. Try to balance on that leg for up to 30 seconds. Then rest for up to 10 seconds. 3. Repeat 6 to 8 times. 4. When you can balance on your affected leg for 30 seconds with your eyes open, try to balance on it with your eyes closed. 5. When you can do this exercise with your eyes closed for 30 seconds and with ease and no pain, try standing on a pillow or piece of foam, and repeat steps 1 through 4. Follow-up care is a key part of your treatment and safety. Be sure to make and go to all appointments, and call your doctor if you are having problems. It's also a good idea to know your test results and keep a list of the medicines you take. Where can you learn more? Go to http://angela-michelle.info/. Fermin Vaughan in the search box to learn more about \"Ankle Sprain: Rehab Exercises. \" Current as of: March 21, 2017 Content Version: 11.4 © 8873-2862 Healthwise, "I AND [employer]. Care instructions adapted under license by [employer] (which disclaims liability or warranty for this information). If you have questions about a medical condition or this instruction, always ask your healthcare professional. [address] any warranty or liability for your use of this information. Neck Arthritis: Exercises Your Care Instructions Here are some examples of typical rehabilitation exercises for your condition. Start each exercise slowly. Ease off the exercise if you start to have pain. Your doctor or physical therapist will tell you when you can start these exercises and which ones will work best for you. How to do the exercises Neck stretches to the side 2. This stretch works best if you keep your shoulder down as you lean away from it. To help you remember to do this, start by relaxing your shoulders and lightly holding on to your thighs or your chair. 3. Tilt your head toward your shoulder and hold for 15 to 30 seconds. Let the weight of your head stretch your muscles. 4. Repeat 2 to 4 times toward each shoulder. Chin tuck 4. Lie on the floor with a rolled-up towel under your neck. Your head should be touching the floor. 5. Slowly bring your chin toward your chest. 
6. Hold for a count of 6, and then relax for up to 10 seconds. 7. Repeat 8 to 12 times. Active cervical rotation 4. Sit in a firm chair, or stand up straight. 5. Keeping your chin level, turn your head to the right, and hold for 15 to 30 seconds. 6. Turn your head to the left and hold for 15 to 30 seconds. 7. Repeat 2 to 4 times to each side. Shoulder blade squeeze 7. While standing, squeeze your shoulder blades together. 8. Do not raise your shoulders up as you are squeezing. 9. Hold for 6 seconds. 10. Repeat 8 to 12 times. Shoulder rolls 3. Sit comfortably with your feet shoulder-width apart.  You can also do this exercise standing up. 4. Roll your shoulders up, then back, and then down in a smooth, circular motion. 5. Repeat 2 to 4 times. Follow-up care is a key part of your treatment and safety. Be sure to make and go to all appointments, and call your doctor if you are having problems. It's also a good idea to know your test results and keep a list of the medicines you take. Where can you learn more? Go to http://angela-michelle.info/. Enter M791 in the search box to learn more about \"Neck Arthritis: Exercises. \" Current as of: March 21, 2017 Content Version: 11.4 © 7018-9027 DocDep. Care instructions adapted under license by [employer] (which disclaims liability or warranty for this information). If you have questions about a medical condition or this instruction, always ask your healthcare professional. [address] any warranty or liability for your use of this information. Introducing [facility]! Dear Merlin Bueno: Thank you for requesting a Spireon account. Our records indicate that you already have an active Spireon account. You can access your account anytime at [email or web address] [email or web address] Did you know that you can access your hospital and ER discharge instructions at any time in Spireon? You can also review all of your test results from your hospital stay or ER visit. Additional Information If you have questions, please visit the Frequently Asked Questions section of the Spireon website at [email or web address]/. Remember, Spireon is NOT to be used for urgent needs. For medical emergencies, dial 911. Now available from your iPhone and Android! Please provide this summary of care documentation to your next provider. Your primary care clinician is listed as Caty Chaney. If you have any questions after today's visit, please call [phone].

## 2018-06-05 NOTE — PROGRESS NOTES
Colon Screen    Patient was contacted by phone for the documentation reflected in this encounter    Patient: Angelique Roblero MRN: [id number]  SSN: [id number]    YOB: 1965  Age: 45 y.o. Sex: female        Subjective:   Angelique Roblero wasreferred due to colonoscopy screening follow up recommendation. PCP is Troy Lawrence MD.  Patient referred for colonoscopy for   Family history of coloretal cancer (screening only). Patient denies abdominal pain,rectal pain or bleeding. Abdominal surgeries as described below, specifically none  Family history as described below, specifically father. Last colonoscopy was 5 years ago Dr Irwin Barton. Is patient currently on Oxygen: no  Is patient taking blood thinners, fluid pills,or medication for diabetes? No but on mobic will stop five days before       Does the patient have a history of any condition listed below? Cardiac, pulmonary or hepatic disease? no  Severe coronary artery disease or aortic stenosis? no  Throat cancer? no  Heart transplant? no  Endocarditis? no  Heart valve replacement? no  Congestive heart failure?  no        Allergies   Allergen Reactions    Latex Other (comments)     \"YEAST INFECTION\"    Amoxicillin Other (comments)     Gave pt bad ha's    Flagyl [Metronidazole] Other (comments)     Pt states headaches and vomiting      Urised [Meth-Hyos-Atrp-M Blue-Ba-Phsal] Itching and Swelling       Past Medical History:   Diagnosis Date    Colon polyps     Constipation     Heart murmur     Hemorrhoids     Mitral valve prolapse     Nausea & vomiting     \"VIOLENTLY SICK AFTER TUBAL LIGATION\"    Plantar fasciitis     Vestibular neuritis      Past Surgical History:   Procedure Laterality Date    HX ANKLE FRACTURE TX      HX BREAST LUMPECTOMY      right    HX COLONOSCOPY      [id number]    HX CYST REMOVAL  2010    right breast    HX GYN      hysterectomy    HX HEENT      tonselectomy    HX HEENT      lasik eye surgery    HX OTHER SURGICAL      cystoscopy    HX OTHER SURGICAL  2010    right ovary removed    HX TUBAL LIGATION        Family History   Problem Relation Age of Onset    Cancer Sister     Cancer Father      colon    Cancer Maternal Grandmother     Hypertension Mother     Cancer Mother      Social History   Substance Use Topics    Smoking status: Never Smoker    Smokeless tobacco: Never Used    Alcohol use Yes      Comment: OCCASIONALLY      Prior to Admission medications    Medication Sig Start Date End Date Taking? Authorizing Provider   coenzyme q10 (CO Q-10) 10 mg cap Take  by mouth. Yes Historical Provider   red yeast rice extract 600 mg cap Take 600 mg by mouth now. Yes Historical Provider   cholecalciferol (VITAMIN D3) 1,000 unit tablet Take  by mouth daily. Yes Historical Provider   calcium-cholecalciferol, d3, (CALCIUM 600 + D) 600-125 mg-unit tab Take  by mouth. Yes Historical Provider   meloxicam (MOBIC) 15 mg tablet Take 1 Tab by mouth daily (with breakfast). 2/20/18  Yes Janis David MD   alendronate (FOSAMAX) 70 mg tablet Take  by mouth. Yes Historical Provider   famotidine (PEPCID) 40 mg tablet Take 1 Tab by mouth daily. 4/17/18   Janis David MD   HYDROcodone-acetaminophen [facility]) 5-325 mg per tablet Take  by mouth. Historical Provider          Review of Systems   Constitutional: Negative. HENT: Negative. Eyes: Negative. Respiratory: Negative. Cardiovascular: Positive for leg swelling and PND. Negative for chest pain, palpitations, orthopnea and claudication. Gastrointestinal: Negative. Genitourinary: Positive for frequency. Negative for dysuria, flank pain, hematuria and urgency. Musculoskeletal: Negative. Skin: Negative. Neurological: Positive for dizziness. Negative for tingling, tremors, sensory change, speech change, focal weakness, seizures, loss of consciousness and headaches. Endo/Heme/Allergies: Negative. Psychiatric/Behavioral: Negative. Risks colonoscopy described- colon injury, missed lesion, anesthesia problems, bleeding       Akbarma LELA Godoy  June 5, 2018  10:00 AM

## 2018-06-05 NOTE — MR AVS SNAPSHOT
[address] [facility] [place] [address] [facility] 
[phone] Patient: Angy Miranda MRN: [id number] TKV:7/25/2832 Visit Information Date & Time Provider Department Dept. Phone Encounter #  
 6/5/2018  9:56 AM Patrick Snow RN [address] [id number] Your Appointments 6/11/2018  1:00 PM  
Follow Up with Nicolas De Los Santos MD  
[address] [facility], [address] [facility] Appt Note: 2 month f/u; .  
 [address], [address] [facility]  
[phone] [address] [facility] Upcoming Health Maintenance Date Due Hepatitis C Screening 1965 DTaP/Tdap/Td series (1 - Tdap) 7/30/1984 BREAST CANCER SCRN MAMMOGRAM 7/30/2013 FOBT Q 1 YEAR AGE 50-75 7/30/2013 PAP AKA CERVICAL CYTOLOGY 2/1/2016 Influenza Age 5 to Adult 8/1/2018 Allergies as of 6/5/2018  Review Complete On: 6/5/2018 By: Patrick Snow RN Severity Noted Reaction Type Reactions Latex  02/12/2013    Other (comments) \"YEAST INFECTION\" Amoxicillin  03/11/2013    Other (comments) Gave pt bad ha's Flagyl [Metronidazole]  11/12/2016    Other (comments) Pt states headaches and vomiting Urised [Meth-hyos-atrp-m Blue-ba-phsal]  10/16/2012    Itching, Swelling Current Immunizations  Never Reviewed No immunizations on file. Not reviewed this visit Vitals OB Status Smoking Status Hysterectomy Never Smoker Preferred Pharmacy Pharmacy Name Phone [phone] [facility], [address] [place] [address] Rd [phone] Your Updated Medication List  
  
   
This list is accurate as of 6/5/18 10:04 AM.  Always use your most recent med list.  
  
  
  
  
 alendronate 70 mg tablet Commonly known as:  FOSAMAX Take  by mouth. CALCIUM 600 + D 600-125 mg-unit Tab Generic drug:  calcium-cholecalciferol (d3) Take  by mouth. CO Q-10 10 mg Cap Generic drug:  coenzyme q10 Take  by mouth. famotidine 40 mg tablet Commonly known as:  PEPCID Take 1 Tab by mouth daily. meloxicam 15 mg tablet Commonly known as:  MOBIC Take 1 Tab by mouth daily (with breakfast). NORCO 5-325 mg per tablet Generic drug:  HYDROcodone-acetaminophen Take  by mouth. red yeast rice extract 600 mg Cap Take 600 mg by mouth now. VITAMIN D3 1,000 unit tablet Generic drug:  cholecalciferol Take  by mouth daily. Introducing [facility]! Dear Kiki Body: Thank you for requesting a SensiGen account. Our records indicate that you already have an active SensiGen account. You can access your account anytime at [email or web address] [employer]ash [email or web address]/[employer] Did you know that you can access your hospital and ER discharge instructions at any time in SensiGen? You can also review all of your test results from your hospital stay or ER visit. Additional Information If you have questions, please visit the Frequently Asked Questions section of the SensiGen website at [email or web address]/. Remember, SensiGen is NOT to be used for urgent needs. For medical emergencies, dial 911. Now available from your iPhone and Android! Please provide this summary of care documentation to your next provider. Your primary care clinician is listed as Lamar Richards. If you have any questions after today's visit, please call [phone].

## 2018-06-11 ENCOUNTER — OFFICE VISIT (OUTPATIENT)
Dept: ORTHOPEDIC SURGERY | Age: 53
End: 2018-06-11

## 2018-06-11 VITALS
SYSTOLIC BLOOD PRESSURE: 111 MMHG | TEMPERATURE: 97.3 F | HEART RATE: 61 BPM | HEIGHT: 66 IN | BODY MASS INDEX: 24.91 KG/M2 | WEIGHT: 155 LBS | DIASTOLIC BLOOD PRESSURE: 71 MMHG | OXYGEN SATURATION: 100 % | RESPIRATION RATE: 16 BRPM

## 2018-06-11 DIAGNOSIS — S82.841S CLOSED BIMALLEOLAR FRACTURE OF RIGHT ANKLE, SEQUELA: Primary | ICD-10-CM

## 2018-06-11 DIAGNOSIS — M25.571 ACUTE RIGHT ANKLE PAIN: ICD-10-CM

## 2018-06-11 RX ORDER — ETODOLAC 400 MG/1
400 TABLET, FILM COATED ORAL 2 TIMES DAILY WITH MEALS
Qty: 60 TAB | Refills: 0 | Status: SHIPPED | OUTPATIENT
Start: 2018-06-11 | End: 2018-10-05 | Stop reason: SDUPTHER

## 2018-06-11 NOTE — PATIENT INSTRUCTIONS
Please follow up in 5 weeks. You are advised to contact us if your condition worsens. Broken Ankle: Care Instructions  Your Care Instructions    An ankle may break (fracture) during sports, a fall, or other accidents. Fractures can range from a small, hairline crack, to a bone or bones broken into two or more pieces. Your treatment depends on how bad the break is. Your doctor may have put your ankle in a splint or cast to allow it to heal or to keep it stable until you see another doctor. It may take weeks or months for your ankle to heal. You can help your ankle heal with some care at home. You heal best when you take good care of yourself. Eat a variety of healthy foods, and don't smoke. You may have had a sedative to help you relax. You may be unsteady after having sedation. It can take a few hours for the medicine's effects to wear off. Common side effects of sedation include nausea, vomiting, and feeling sleepy or tired. The doctor has checked you carefully, but problems can develop later. If you notice any problems or new symptoms,  get medical treatment right away. Follow-up care is a key part of your treatment and safety. Be sure to make and go to all appointments, and call your doctor if you are having problems. It's also a good idea to know your test results and keep a list of the medicines you take. How can you care for yourself at home? · If the doctor gave you a sedative:  ¨ For 24 hours, don't do anything that requires attention to detail. It takes time for the medicine's effects to completely wear off. ¨ For your safety, do not drive or operate any machinery that could be dangerous. Wait until the medicine wears off and you can think clearly and react easily. · Put ice or a cold pack on your ankle for 10 to 20 minutes at a time. Try to do this every 1 to 2 hours for the next 3 days (when you are awake). Put a thin cloth between the ice and your cast or splint.  Keep your cast or splint dry.  · Follow the cast care instructions your doctor gives you. If you have a splint, do not take it off unless your doctor tells you to. · Be safe with medicines. Take pain medicines exactly as directed. ¨ If the doctor gave you a prescription medicine for pain, take it as prescribed. ¨ If you are not taking a prescription pain medicine, ask your doctor if you can take an over-the-counter medicine. · Prop up your leg on pillows in the first few days after the injury. Keep the ankle higher than the level of your heart. This will help reduce swelling. · Do not put weight on your ankle unless your doctor tells you to. Use crutches to walk. · Follow instructions for exercises to keep your leg strong. · Wiggle your toes often to reduce swelling and stiffness. When should you call for help? Call 911 anytime you think you may need emergency care. For example, call if:  ? · You have chest pain, are short of breath, or you cough up blood. ? · You are very sleepy and you have trouble waking up. ?Call your doctor now or seek immediate medical care if:  ? · You have new or worse nausea or vomiting. ? · You have new or worse pain. ? · Your foot is cool or pale or changes color. ? · You have tingling, weakness, or numbness in your toes. ? · Your cast or splint feels too tight. ? · You have signs of a blood clot in your leg (called a deep vein thrombosis), such as:  ¨ Pain in your calf, back of the knee, thigh, or groin. ¨ Redness or swelling in your leg. ? Watch closely for changes in your health, and be sure to contact your doctor if:  ? · You have a problem with your splint or cast.   ? · You do not get better as expected. Where can you learn more? Go to http://angela-michelle.info/. Enter P763 in the search box to learn more about \"Broken Ankle: Care Instructions. \"  Current as of: March 21, 2017  Content Version: 11.4  © 4928-0099 Cover.  Care instructions adapted under license by [employer] (which disclaims liability or warranty for this information). If you have questions about a medical condition or this instruction, always ask your healthcare professional. [address] any warranty or liability for your use of this information.

## 2018-06-11 NOTE — PROCEDURES
DIAGNOSTIC STUDIES:  X-rays of the right tibia and fibula reveal postop changes from a right distal tibia and fibula fracture fixation. Surgery was performed by Dr. Lianne Purdy of [facility]. Additional surgery was done, I believe, in July or August 2017. Otherwise, the plate and screws are in excellent position. There is continued bone healing seen to the distal tibia, for which this residual fracture line is seen distal to the third proximal screw. Otherwise, the ankle joint is well maintained. There are no fractures, subluxations, or dislocations. The ankle, subtalar, and transverse tarsal joints are well maintained.

## 2018-06-11 NOTE — MR AVS SNAPSHOT
[address], [address] 200 [facility] 
[phone] Patient: Maribel Pelayo MRN: [id number] POT:[id number] Visit Information Date & Time Provider Department Dept. Phone Encounter #  
 6/11/2018  1:00 PM Farzana Brown, [address] [facility] [phone] Your Appointments 7/19/2018  1:00 PM  
Nurse Visit with [facility] NURSE VISIT [facility] ([address]) Appt Note: was due 2/2018 Has other health issues / wants to wait to September [address] [place] [address] [id number] [address] [facility] Upcoming Health Maintenance Date Due Hepatitis C Screening 1965 DTaP/Tdap/Td series (1 - Tdap) 7/30/1984 BREAST CANCER SCRN MAMMOGRAM 7/30/2013 FOBT Q 1 YEAR AGE 50-75 7/30/2013 PAP AKA CERVICAL CYTOLOGY 2/1/2016 Influenza Age 5 to Adult 8/1/2018 Allergies as of 6/11/2018  Review Complete On: 6/11/2018 By: Farzana Brown MD  
  
 Severity Noted Reaction Type Reactions Latex  02/12/2013    Other (comments) \"YEAST INFECTION\" Amoxicillin  03/11/2013    Other (comments) Gave pt bad ha's Flagyl [Metronidazole]  11/12/2016    Other (comments) Pt states headaches and vomiting Urised [Meth-hyos-atrp-m Blue-ba-phsal]  10/16/2012    Itching, Swelling Current Immunizations  Never Reviewed No immunizations on file. Not reviewed this visit You Were Diagnosed With   
  
 Codes Comments Acute right ankle pain    -  Primary ICD-10-CM: M25.571 ICD-9-CM: 719.47, 338.19 Closed bimalleolar fracture of right ankle, sequela     ICD-10-CM: G14.710J 
ICD-9-CM: 905. 4 Vitals BP Pulse Temp Resp Height(growth percentile) Weight(growth percentile) 111/71 61 97.3 °F (36.3 °C) (Oral) 16 5' 6\" (1.676 m) 155 lb (70.3 kg) SpO2 BMI OB Status Smoking Status 100% 25.02 kg/m2 Hysterectomy Never Smoker BMI and BSA Data Body Mass Index Body Surface Area 25.02 kg/m 2 1.81 m 2 Preferred Pharmacy Pharmacy Name Phone [facility] [address] [place] [address], [address] [place] [address] Rd [phone] Your Updated Medication List  
  
   
This list is accurate as of 6/11/18  1:44 PM.  Always use your most recent med list.  
  
  
  
  
 alendronate 70 mg tablet Commonly known as:  FOSAMAX Take  by mouth. CALCIUM 600 + D 600-125 mg-unit Tab Generic drug:  calcium-cholecalciferol (d3) Take  by mouth. CO Q-10 10 mg Cap Generic drug:  coenzyme q10 Take  by mouth. famotidine 40 mg tablet Commonly known as:  PEPCID Take 1 Tab by mouth daily. meloxicam 15 mg tablet Commonly known as:  MOBIC Take 1 Tab by mouth daily (with breakfast). NORCO 5-325 mg per tablet Generic drug:  HYDROcodone-acetaminophen Take  by mouth. red yeast rice extract 600 mg Cap Take 600 mg by mouth now. VITAMIN D3 1,000 unit tablet Generic drug:  cholecalciferol Take  by mouth daily. We Performed the Following AMB POC XRAY, ANKLE; COMPLETE, 3+ VIE [20581 CPT(R)] Patient Instructions Please follow up in 5 weeks. You are advised to contact us if your condition worsens. Broken Ankle: Care Instructions Your Care Instructions An ankle may break (fracture) during sports, a fall, or other accidents. Fractures can range from a small, hairline crack, to a bone or bones broken into two or more pieces. Your treatment depends on how bad the break is. Your doctor may have put your ankle in a splint or cast to allow it to heal or to keep it stable until you see another doctor.  It may take weeks or months for your ankle to heal. You can help your ankle heal with some care at home. You heal best when you take good care of yourself. Eat a variety of healthy foods, and don't smoke. You may have had a sedative to help you relax. You may be unsteady after having sedation. It can take a few hours for the medicine's effects to wear off. Common side effects of sedation include nausea, vomiting, and feeling sleepy or tired. The doctor has checked you carefully, but problems can develop later. If you notice any problems or new symptoms,  get medical treatment right away. Follow-up care is a key part of your treatment and safety. Be sure to make and go to all appointments, and call your doctor if you are having problems. It's also a good idea to know your test results and keep a list of the medicines you take. How can you care for yourself at home? · If the doctor gave you a sedative: ¨ For 24 hours, don't do anything that requires attention to detail. It takes time for the medicine's effects to completely wear off. ¨ For your safety, do not drive or operate any machinery that could be dangerous. Wait until the medicine wears off and you can think clearly and react easily. · Put ice or a cold pack on your ankle for 10 to 20 minutes at a time. Try to do this every 1 to 2 hours for the next 3 days (when you are awake). Put a thin cloth between the ice and your cast or splint. Keep your cast or splint dry. · Follow the cast care instructions your doctor gives you. If you have a splint, do not take it off unless your doctor tells you to. · Be safe with medicines. Take pain medicines exactly as directed. ¨ If the doctor gave you a prescription medicine for pain, take it as prescribed. ¨ If you are not taking a prescription pain medicine, ask your doctor if you can take an over-the-counter medicine. · Prop up your leg on pillows in the first few days after the injury. Keep the ankle higher than the level of your heart. This will help reduce swelling. · Do not put weight on your ankle unless your doctor tells you to. Use crutches to walk. · Follow instructions for exercises to keep your leg strong. · Wiggle your toes often to reduce swelling and stiffness. When should you call for help? Call 911 anytime you think you may need emergency care. For example, call if: 
? · You have chest pain, are short of breath, or you cough up blood. ? · You are very sleepy and you have trouble waking up. ?Call your doctor now or seek immediate medical care if: 
? · You have new or worse nausea or vomiting. ? · You have new or worse pain. ? · Your foot is cool or pale or changes color. ? · You have tingling, weakness, or numbness in your toes. ? · Your cast or splint feels too tight. ? · You have signs of a blood clot in your leg (called a deep vein thrombosis), such as: 
¨ Pain in your calf, back of the knee, thigh, or groin. ¨ Redness or swelling in your leg. ? Watch closely for changes in your health, and be sure to contact your doctor if: 
? · You have a problem with your splint or cast.  
? · You do not get better as expected. Where can you learn more? Go to http://angela-michelle.info/. Enter P763 in the search box to learn more about \"Broken Ankle: Care Instructions. \" Current as of: March 21, 2017 Content Version: 11.4 © 7849-0949 [employer]. Care instructions adapted under license by [employer] (which disclaims liability or warranty for this information). If you have questions about a medical condition or this instruction, always ask your healthcare professional. [place] [address] any warranty or liability for your use of this information. Introducing [facility]! Dear Mariola Cedeno: Thank you for requesting a [employer] account. Our records indicate that you already have an active [employer] account. You can access your account anytime at [email or web address] Did you know that you can access your hospital and ER discharge instructions at any time in [email or web address]? You can also review all of your test results from your hospital stay or ER visit. Additional Information If you have questions, please visit the Frequently Asked Questions section of the [email or web address] website at [email or web address] Remember, [email or web address] is NOT to be used for urgent needs. For medical emergencies, dial 911. Now available from your iPhone and Android! Please provide this summary of care documentation to your next provider. Your primary care clinician is listed as Le Aparicio. If you have any questions after today's visit, please call [phone].

## 2018-06-11 NOTE — LETTER
NOTIFICATION RETURN TO WORK / SCHOOL 
 
6/11/2018 1:43 PM 
 
Ms. Bennett Duenas 
[address] [facility] 
[address] [place] [id number] To Whom It May Concern: 
 
Bennett Duenas is currently under the care of [address] [place] [address] Mayito Turner. Please limit walking, standing for 20 minute. No kneeling, bending, squatting, lifting, and twisting up to 10 pounds. No climbing ladders. If there are questions or concerns please have the patient contact our office.  
 
 
 
Sincerely, 
 
 
José Manuel Ordonez MD

## 2018-06-11 NOTE — PROGRESS NOTES
AMBULATORY PROGRESS NOTE      Patient: Fadi Barron             MRN: [id number]     SSN: [id number] Body mass index is 25.02 kg/(m^2). YOB: 1965     AGE: 45 y.o. EX: female    PCP: Lila Conway MD    IMPRESSION/DIAGNOSIS AND TREATMENT PLAN     DIAGNOSES  1. Closed bimalleolar fracture of right ankle, sequela    2. Acute right ankle pain        Orders Placed This Encounter    [11322] Ankle Min 3V    REFERRAL TO PHYSICAL THERAPY    etodolac (LODINE) 400 mg tablet      Fadi Barron understands her diagnoses and the proposed plan. I answered numerous questions regarding how long she is going to have stiffness. She is going to have stiffness upwards of 12-18 months. She has start-up stiffness and soreness and activity-related swelling. She is going to have this upwards of a year from the date of her injury. So, it is going to persist, at a minimum, until 2018. I explained to her and her sister that this is indeed a severe injury. This is a pilon fracture of the fibula. This is different than an ankle fracture and patients have higher chances of post-traumatic stiffness, and she understands this. Plan:    1) Continue using the supportive shoes as directed. 2) D/C Mobic 15 mg as directed. 3) Lodine 400 m PO BID; 60 tablets, 0 refills. 4) Work Note: Please limit walking, standing for 20 minute. No kneeling, bending, squatting, lifting, and twisting up to 10 pounds. No climbing ladders. 5) Referral for FCE. RTO - 5 weeks    HPI AND EXAMINATION     Fadi Barron IS A 45 y.o. female who presents to my outpatient office for follow up of a closed fracture of the distal end of the right fibula, and pes planus of right foot. At last visit, I recommended to continue supplementing Calcium and Vitamin D, continue HEP with Theraband, prescribed Mobic 15 mg and Pepcid 40 mg, and gave Temporary DMV placard. Original date of ankle fracture 2018.     Since we saw her last, Ms. Jak Arceo reports she continues to experience pain and discomfort along her right ankle. XR imaging of the right ankle has been reviewed with the patient showing no changes to her alignment. She has purchased new tennis shoes with PureStride inserts. Additionally, she is concerned about the swelling and continues to use the HEP with therabands. However, he notes feeling discouraged because she has not noticed any improvement since her last OV. Patient reports that recently she had a week long work assignment and notes the right ankle was painful as she was not able to wear supportive shoes. Of note, she does not take Mobic everyday since she does not need it. Of note, patient is unable to stand or walk for over an hour without experiencing pain. Patient denies h/o GI complication or bariatric procedures.     Right ANKLE and FOOT        Gait: slow  Tenderness: mild tenderness along the lateral malleolus. Cutaneous: No rashes, skin patches, wounds, or abrasions to the lower legs                                Warm and Normal color. No regions of expressible drainage. Medial Border of Tibia Region: absent                                Skin color, texture, turgor normal. Normal.                                Well healed surgical scar along the anterior ankle and tibia. Joint Motion: ROM Ankle:Decreased , Hindfoot: Limited inversion, full eversion}, Forefoot toes:Normal  Neurologic Exam: Neuro: Motor: normal 5/5 strength in all tested muscle groups and Sensory : no sensory deficits noted. Contractures: Gastrocnemius or Achilles Contractures absent  Joint / Tendon Stability: No Ankle or Subtalar instability or joint laxity.                                              No peroneal sublux ability or dislocation  Alignment: Slight Pes Planus  Vascular: Normal Pulses/ NL Capillary refill, No evidence of DVT seen on physical exam.                        No calf swelling, no tenderness to calf muscles. Lymphatic:  No Evidence of Lymphedema.     Neck: CERVICAL SPINE                        Muscular tenderness not present                        Passive range of motion within normal limits. Active range of motion within normal limits                        Neck muscles supple. Spasm of muscles not present. No JVD present. No tracheal deviation, no erythema and normal range of motion                         Present. No thyromegaly present. Lymphadenopathy:                         No cervical adenopathy. No spinous process tenderness present. No rigidity. No edema and no erythema present. CHART REVIEW     Past Medical History:   Diagnosis Date    Colon polyps     Constipation     Heart murmur     Hemorrhoids     Mitral valve prolapse     Nausea & vomiting     \"VIOLENTLY SICK AFTER TUBAL LIGATION\"    Plantar fasciitis     Vestibular neuritis      Current Outpatient Prescriptions   Medication Sig    etodolac (LODINE) 400 mg tablet Take 400 mg by mouth two (2) times daily (with meals).  coenzyme q10 (CO Q-10) 10 mg cap Take  by mouth.  red yeast rice extract 600 mg cap Take 600 mg by mouth now.  cholecalciferol (VITAMIN D3) 1,000 unit tablet Take  by mouth daily.  calcium-cholecalciferol, d3, (CALCIUM 600 + D) 600-125 mg-unit tab Take  by mouth.  famotidine (PEPCID) 40 mg tablet Take 1 Tab by mouth daily.  HYDROcodone-acetaminophen (NORCO) 5-325 mg per tablet Take  by mouth.  alendronate (FOSAMAX) 70 mg tablet Take  by mouth. No current facility-administered medications for this visit.       Allergies   Allergen Reactions    Latex Other (comments)     \"YEAST INFECTION\"    Amoxicillin Other (comments)     Gave pt bad ha's    Flagyl [Metronidazole] Other (comments)     Pt states headaches and vomiting      Urised [Meth-Hyos-Atrp-M Blue-Ba-Phsal] Itching and Swelling     Past Surgical History:   Procedure Laterality Date    HX ANKLE FRACTURE TX      HX BREAST LUMPECTOMY      right    HX COLONOSCOPY      2010,2013    HX CYST REMOVAL  2010    right breast    HX GYN      hysterectomy    HX HEENT      tonselectomy    HX HEENT      lasik eye surgery    HX OTHER SURGICAL      cystoscopy    HX OTHER SURGICAL  2010    right ovary removed    HX TUBAL LIGATION       Social History     Occupational History    Not on file. Social History Main Topics    Smoking status: Never Smoker    Smokeless tobacco: Never Used    Alcohol use Yes      Comment: OCCASIONALLY    Drug use: No    Sexual activity: Not on file     Family History   Problem Relation Age of Onset    Cancer Sister     Cancer Father      colon    Cancer Maternal Grandmother     Hypertension Mother     Cancer Mother        REVIEW OF SYSTEMS : 6/11/2018  ALL BELOW ARE Negative except : SEE HPI       Constitutional: Negative for fever, chills and weight loss. Neg Weigh Loss  Cardiovascular: Negative for chest pain, claudication and leg swelling. SOB, PORTER   Gastrointestinal: Negative for  pain, N/V/D/C, Blood in stool or urine,dysuria, hematuria,        Incontinence, pelvic pain  Musculoskeletal: see HPI. Neurological: Negative for dizziness and weakness. Negative for headaches,Visual Changes, Confusion, Seizures,   Psychiatric/Behavioral: Negative for depression, memory loss and substance abuse. Extremities:  Negative for  hair changes, rash or skin lesion changes. Hematologic: Negative for Bleeding problems, bruising, pallor or swollen lymph nodes.   Peripheral Vascular: No calf pain, vascular vein tenderness to calf pain              No calf throbbing, posterior knee throbbing pain    DIAGNOSTIC IMAGING      Dictation on: 06/11/2018  1:19 PM by: Carlos Chavez [[id number]]         Written by Mamadou Gonzales, as dictated by Ev Herzog MD. Dr. CARLOS EDUARDO, Ev Herzog MD, confirm that all documentation is accurate.

## 2018-08-14 ENCOUNTER — DOCUMENTATION ONLY (OUTPATIENT)
Dept: ORTHOPEDIC SURGERY | Age: 53
End: 2018-08-14

## 2018-08-14 NOTE — PROGRESS NOTES
Pt dropped off a [employer] form to the [facility] location. Please advise pt at [phone] when complete.

## 2018-08-15 ENCOUNTER — OFFICE VISIT (OUTPATIENT)
Dept: ORTHOPEDIC SURGERY | Age: 53
End: 2018-08-15

## 2018-08-15 VITALS
BODY MASS INDEX: 24.68 KG/M2 | OXYGEN SATURATION: 98 % | DIASTOLIC BLOOD PRESSURE: 69 MMHG | TEMPERATURE: 96.8 F | HEIGHT: 66 IN | WEIGHT: 153.6 LBS | SYSTOLIC BLOOD PRESSURE: 114 MMHG | RESPIRATION RATE: 16 BRPM | HEART RATE: 64 BPM

## 2018-08-15 DIAGNOSIS — M21.41 PES PLANUS OF RIGHT FOOT: ICD-10-CM

## 2018-08-15 DIAGNOSIS — S82.841S CLOSED BIMALLEOLAR FRACTURE OF RIGHT ANKLE, SEQUELA: ICD-10-CM

## 2018-08-15 DIAGNOSIS — S82.301D CLOSED FRACTURE OF DISTAL END OF RIGHT FIBULA AND TIBIA WITH ROUTINE HEALING, SUBSEQUENT ENCOUNTER: Primary | ICD-10-CM

## 2018-08-15 DIAGNOSIS — G89.29 CHRONIC PAIN OF RIGHT ANKLE: ICD-10-CM

## 2018-08-15 DIAGNOSIS — M25.571 CHRONIC PAIN OF RIGHT ANKLE: ICD-10-CM

## 2018-08-15 DIAGNOSIS — S82.831D CLOSED FRACTURE OF DISTAL END OF RIGHT FIBULA AND TIBIA WITH ROUTINE HEALING, SUBSEQUENT ENCOUNTER: Primary | ICD-10-CM

## 2018-08-15 RX ORDER — ERGOCALCIFEROL 1.25 MG/1
50000 CAPSULE ORAL
COMMUNITY
End: 2018-09-13

## 2018-08-15 NOTE — PROGRESS NOTES
AMBULATORY PROGRESS NOTE      Patient: Aly Gonzalez             MRN: [id number]     SSN: [id number] Body mass index is 24.79 kg/(m^2). YOB: 1965     AGE: 52 y.o. EX: female    PCP: Deni Grewal MD       IMPRESSION//  DIAGNOSIS AND TREATMENT PLAN         DIAGNOSES  1. Closed fracture of distal end of right fibula and tibia with routine healing, subsequent encounter    2. Closed bimalleolar fracture of right ankle, sequela    3. Chronic pain of right ankle    4. Pes planus of right foot        Orders Placed This Encounter    [96425] Ankle Min 3V      Aly Gonzalez understands her diagnoses and the proposed plan. PLAN  HPI //  EXAMINATION        Aly Gonzalez IS A 52 y.o. female who presents to my outpatient office for evaluation of:     Ms. Peng Oden, a 52-year-old female, is here for follow-up and assessment. It is to be noted she had ankle, distal tibia and fibula fracture fixation, right side, by Dr. Anibal Hunter of [facility], in 08/2017. She reports having a few concerns: That she still has stiffness of her foot and ankle, has difficulty walking or standing or walking for any significant period of time. She had x-rays today, 3 views, of the right ankle. Results are listed as below. PLAN:  1. I am going to see her in 3 months' time. 2. I do give her a note to the inform the TSA agents that she has an ankle hardware in place from her right distal tibia and fibula fractures. 3. Work note was written to allow her to, hopefully obtain a new job but with restrictions such that she does not stand over 15 minutes in any continuous fashion without a sit-down, and restrictions to allow her to no longer lift or do not lift more than 25-30 pounds. She has not done any TheraBand recently, has not done any supervised physical therapy recently. We addressed several concerns in terms of the stiffness she has in her hindfoot.   I explained to her that usually patient that has postop stiffness, post-traumatic stiffness, may persist and have this upwards of 12-18 months after the injury. Any stiffness that she does have at that point may become permanent. Additional plans would be for her to continue home exercise stretching program.    We addressed other issues in terms of the Functional Capacity Evaluation. Functional Capacity Evaluation was not done, although I talked about it, because it was not Workers' Compensation and Spencer Garrett may not cover the formal Functional Capacity Evaluation or FCE. PHYSICAL EXAMINATION:  On exam, the patient is alert, follows commands. She is in no acute distress. Her affect and mood are appropriate. Her right lower extremity is examined. She has well-healed surgical scars about her ankle anteriorly and laterally. I can palpate at the fibula plate laterally but I cannot palpate any screws at this time. These are recessed screws that are locked screws into the plate. She definitely has decreased range of motion that relates to inversion of her hindfoot. She has full eversion, has near full-plantarflexion, has diminished dorsiflexion. There is no grinding, popping, or instability of her ankle. Her calf is soft and nontender. She does walk with a very slight limp. She walks with the right foot slightly externally rotated, more slight limp when she begins walking but assumes a more normalized pattern of gait in terms of her foot progression angle, although it is slightly more externally rotated on this injured right side, and in terms of her jack of gait. Her stride length is a little bit shorter (this is not unusual due to her having stiffness in the anterior portion of her ankle and incomplete dorsiflexion).       Visit Vitals    /69    Pulse 64    Temp 96.8 °F (36 °C) (Oral)    Resp 16    Ht 5' 6\" (1.676 m)    Wt 153 lb 9.6 oz (69.7 kg)    SpO2 98%    BMI 24.79 kg/m2 REVIEW OF SYSTEMS : 8/15/2018  ALL BELOW ARE Negative except : SEE HPI          Past Medical History:   Diagnosis Date    Colon polyps     Constipation     Heart murmur     Hemorrhoids     Mitral valve prolapse     Nausea & vomiting     \"VIOLENTLY SICK AFTER TUBAL LIGATION\"    Plantar fasciitis     Vestibular neuritis      Current Outpatient Prescriptions   Medication Sig    ergocalciferol (VITAMIN D2) 50,000 unit capsule Take 50,000 Units by mouth.  cycloSPORINE (RESTASIS MULTIDOSE) 0.05 % drop ophthalmic drops 1 Drop two (2) times a day.  etodolac (LODINE) 400 mg tablet Take 400 mg by mouth two (2) times daily (with meals).  coenzyme q10 (CO Q-10) 10 mg cap Take  by mouth.  red yeast rice extract 600 mg cap Take 600 mg by mouth now.  calcium-cholecalciferol, d3, (CALCIUM 600 + D) 600-125 mg-unit tab Take  by mouth.  cholecalciferol (VITAMIN D3) 1,000 unit tablet Take  by mouth daily.  famotidine (PEPCID) 40 mg tablet Take 1 Tab by mouth daily.  HYDROcodone-acetaminophen (NORCO) 5-325 mg per tablet Take  by mouth.  alendronate (FOSAMAX) 70 mg tablet Take  by mouth. No current facility-administered medications for this visit. Allergies   Allergen Reactions    Latex Other (comments)     \"YEAST INFECTION\"    Amoxicillin Other (comments)     Gave pt bad ha's    Flagyl [Metronidazole] Other (comments)     Pt states headaches and vomiting      Urised [Meth-Hyos-Atrp-M Blue-Ba-Phsal] Itching and Swelling     Past Surgical History:   Procedure Laterality Date    HX ANKLE FRACTURE TX      HX BREAST LUMPECTOMY      right    HX COLONOSCOPY      2010,2013    HX CYST REMOVAL  2010    right breast    HX GYN      hysterectomy    HX HEENT      tonselectomy    HX HEENT      lasik eye surgery    HX OTHER SURGICAL      cystoscopy    HX OTHER SURGICAL  2010    right ovary removed    HX TUBAL LIGATION       Social History     Occupational History    Not on file.      Social History Main Topics    Smoking status: Never Smoker    Smokeless tobacco: Never Used    Alcohol use Yes      Comment: OCCASIONALLY    Drug use: No    Sexual activity: Not on file     Family History   Problem Relation Age of Onset    Cancer Sister     Cancer Father      colon    Cancer Maternal Grandmother     Hypertension Mother     Cancer Mother              REVIEW OF SYSTEMS : 8/15/2018  ALL BELOW ARE Negative except : SEE HPI      Constitutional: Negative for fever, chills and weight loss. Neg Weight Loss  Cardiovascular: Negative for chest pain, claudication and leg swelling. SOB, PORTER   Gastrointestinal/Urological: Negative for pain, N/V/D/C, Blood in stool or urine,dysuria,  Hematuria, Incontinence  Endocrine: See HPI  Skin: see HPI  Musculoskeletal: see HPI. Neurological: Negative for dizziness and weakness, headaches,Visual Changes or   Confusion, or Seizures,   Psychiatric/Behavioral: Negative for depression, memory loss and substance abuse. Extremities:  Negative for hair changes, rash or skin lesion changes. Hematologic: Negative for Bleeding problems, bruising, pallor or swollen lymph nodes. Peripheral Vascular: No calf pain, vascular vein tenderness to calf pain              No calf throbbing, posterior knee throbbing pain         DIAGNOSTIC IMAGING           Dictation on: 08/15/2018  1:29 PM by: Tere Ochoa [[id number]]          Please see above section of this report. I have personally reviewed the results of the above study. The interpretation of this study is my professional opinion.       Cristin Taylor MD  8/15/2018  2:02 PM

## 2018-08-15 NOTE — LETTER
NOTIFICATION RETURN TO WORK / SCHOOL 
 
8/15/2018 1:53 PM 
 
Ms. Teena Fisher 
[address] [facility] 
[address] [facility] [address] To Whom It May Concern: 
 
Teena Fisher is currently under the care of [address]. Please note that Ms. Neli Person has metal hardware along her right tibia and fibula that was placed due to a right lower extremity injury she had in the past.  
 
If there are questions or concerns please have the patient contact our office.  
 
 
 
Sincerely, 
 
 
Zeeshan Dhillon MD

## 2018-08-15 NOTE — PATIENT INSTRUCTIONS
Please follow up in 3 months. You are advised to contact us if your condition worsens. Ankle Fracture: Rehab Exercises  Your Care Instructions  Here are some examples of typical rehabilitation exercises for your condition. Start each exercise slowly. Ease off the exercise if you start to have pain. Your doctor or physical therapist will tell you when you can start these exercises and which ones will work best for you. How to do the exercises  Calf stretch (knee straight)    For this exercise, you will need a towel. 1. Sit with your affected leg straight and supported on the floor. Your other leg should be bent, with that foot flat on the floor. 2. Place a towel around your affected foot just under the toes. 3. Hold one end of the towel in each hand, with your hands above your knees. 4. Pull back gently with the towel so that your foot stretches toward you. 5. Hold the position for at least 15 to 30 seconds. 6. Repeat 2 to 4 times a session, up to 5 sessions a day. Calf stretch (knee bent)    For this exercise, you will need a towel. You will also need a pillow or foam roll. 1. Sit with your affected leg straight and supported on the floor. Your other leg should be bent, with that foot flat on the floor. 2. Place a pillow or foam roll under your affected leg. 3. Place a towel around your affected foot just under the toes. 4. Hold one end of the towel in each hand, with your hands above your knees. 5. Pull back gently with the towel so that your foot stretches toward you. 6. Hold the position for at least 15 to 30 seconds. 7. Repeat 2 to 4 times a session, up to 5 sessions a day. Ankle plantar flexion    1. Sit with your affected leg straight and supported on the floor. Your other leg should be bent, with that foot flat on the floor. 2. Keeping your affected leg straight, gently flex your foot downward so your toes are pointed away from your body.  Then slowly relax your foot to the starting position. 3. Repeat 8 to 12 times. Ankle dorsiflexion    1. Sit with your affected leg straight and supported on the floor. Your other leg should be bent, with that foot flat on the floor. 2. Keeping your affected leg straight, gently flex your foot back toward your body so your toes point upward. Then slowly relax your foot to the starting position. 3. Repeat 8 to 12 times. Resisted ankle plantar flexion    For the next four exercises, you will need elastic exercise material, such as surgical tubing or Thera-Band. 1. Sit with your affected leg straight and supported on the floor. Your other leg should be bent, with that foot flat on the floor. 2. Place an elastic band around your affected foot just under the toes. 3. Hold each end of the band in each hand, with your hands above your knees. 4. Keeping your affected leg straight, gently flex your foot downward so your toes are pointed away from your body. Then slowly relax your foot to the starting position. 5. Repeat 8 to 12 times. Resisted ankle dorsiflexion    1. Tie the ends of an exercise band together to form a loop. Attach one end of the loop to a secure object, like a table leg, or shut a door on it to hold it in place. (Or you can have someone hold one end of the loop to provide resistance.)  2. While sitting on the floor or in a chair, loop the other end of the band over the top of your affected foot. 3. Keeping your knee and leg straight, slowly flex your foot toward you to pull back on the exercise band, and then slowly relax. 4. Repeat 8 to 12 times. Resisted ankle inversion    1. Sit on the floor with your good leg crossed over your other leg. 2. Hold both ends of an exercise band and loop the band around the inside of your affected foot. Then press your good foot against the band. 3. Keeping your legs crossed, slowly push your affected foot against the band so that foot moves away from your good foot. Then slowly relax.   4. Repeat 8 to 12 times. Resisted ankle eversion    1. Sit on the floor with your legs straight. 2. Hold both ends of an exercise band and loop the band around the outside of your affected foot. Then press your good foot against the band. 3. Keeping your leg straight, slowly push your affected foot outward against the band and away from your good foot without letting your leg rotate. Then slowly relax. 4. Repeat 8 to 12 times. Ankle alphabet    1. Sit in a chair with your feet flat on the floor. (You can also do this exercise lying on your back with your affected leg propped up on a pillow). 2. Lift the heel of your affected foot off the floor, and slowly trace the letters of the alphabet. Heel raises    1. Stand with your feet a few inches apart, with your hands lightly resting on a counter or chair in front of you. 2. Slowly raise your heels off the floor while keeping your knees straight. 3. Hold for about 6 seconds, then slowly lower your heels to the floor. 4. Do 8 to 12 repetitions several times during the day. Follow-up care is a key part of your treatment and safety. Be sure to make and go to all appointments, and call your doctor if you are having problems. It's also a good idea to know your test results and keep a list of the medicines you take. Where can you learn more? Go to http://angela-michelle.info/. Enter T800 in the search box to learn more about \"Ankle Fracture: Rehab Exercises. \"  Current as of: November 29, 2017  Content Version: 11.7  © 4251-4100 [employer]. Care instructions adapted under license by [employer] (which disclaims liability or warranty for this information). If you have questions about a medical condition or this instruction, always ask your healthcare professional. [address] any warranty or liability for your use of this information.

## 2018-08-15 NOTE — PROCEDURES
X-rays of the right ankle, 3 views, AP, lateral, and oblique shows right distal tibia fracture, healed fibula fracture. No osteolytic or osteoblastic lesions seen on these ankle films. There is some generalized osteopenia, however, seen to the ankle joint.

## 2018-08-15 NOTE — LETTER
NOTIFICATION RETURN TO WORK / SCHOOL 
 
8/15/2018 1:40 PM 
 
Ms. Priya Bautista 
[address] [address] To Whom It May Concern: 
 
Priya Bautista is currently under the care of [address] [place] [address] [address]. Please allow her to work with restrictions to include:  
  1) No lifting over 25-30 pounds. 2) No standing over 15-20 minutes without a sit down period. 3) Please limit kneeling, bending, squatting, lifting, and twisting. If there are questions or concerns please have the patient contact our office.  
 
 
 
Sincerely, 
 
 
Nidia Donis MD

## 2018-08-15 NOTE — LETTER
NOTIFICATION RETURN TO WORK / SCHOOL 
 
8/15/2018 2:00 PM 
 
Ms. Jaida Duran 
[address] [address] To Whom It May Concern: 
 
Jaida Duran is currently under the care of [address]. Please allow her to work with restrictions to include:  
  1) No lifting over 25-30 pounds. 2) No standing over 15-20 minutes without a sit down period. 3) Please limit kneeling, bending, squatting, lifting, and twisting. 4) Please allow her to work only 3 days per work week. If there are questions or concerns please have the patient contact our office.  
 
 
 
Sincerely, 
 
 
Edel Jules MD

## 2018-08-15 NOTE — PROGRESS NOTES
AMBULATORY PROGRESS NOTE      Patient: Priya Bautista             MRN: [id number]     SSN: [id number] Body mass index is 24.79 kg/(m^2). YOB: 1965     AGE: 52 y.o. EX: female    PCP: Bryce Main MD    IMPRESSION/DIAGNOSIS AND TREATMENT PLAN     DIAGNOSES  1. Chronic pain of right ankle    2. Closed bimalleolar fracture of right ankle, sequela    3. Closed fracture of distal end of right fibula and tibia with routine healing, subsequent encounter    4. Pes planus of right foot        Orders Placed This Encounter    [[id number]] Ankle Min 3V      Priya Bautista understands her diagnoses and the proposed plan. Plan:    1)  2)    RTO -    HPI AND EXAMINATION     Meli Kent IS A 52 y.o. female who presents to my outpatient office for follow up of a closed fracture of the distal end of the right fibula, and pes planus of right foot. At last visit, I recommended to continue using the supportive shoes as directed, discontinued Mobic 15 mg, prescribed Lodine 400 mg, and gave a work note. Patient denies h/o GI complication or bariatric procedures.      Right ANKLE and FOOT         Gait: slow  Tenderness: mild tenderness along the lateral malleolus. Cutaneous: No rashes, skin patches, wounds, or abrasions to the lower legs                                Warm and Normal color. No regions of expressible drainage.                                Medial Border of Tibia Region: absent                                Skin color, texture, turgor normal. Normal.                                Well healed surgical scar along the anterior ankle and tibia. Joint Motion: ROM Ankle:Decreased , Hindfoot: Limited inversion, full eversion}, Forefoot toes:Normal  Neurologic Exam: Neuro: Motor: normal 5/5 strength in all tested muscle groups and Sensory : no sensory deficits noted.    Contractures: Gastrocnemius or Achilles Contractures absent  Joint / Tendon Stability: No Ankle or Subtalar instability or joint laxity.                                           No peroneal sublux ability or dislocation  Alignment: Slight Pes Planus  Vascular: Normal Pulses/ NL Capillary refill, No evidence of DVT seen on physical exam.                        No calf swelling, no tenderness to calf muscles. Lymphatic:  No Evidence of Lymphedema.      Neck: CERVICAL SPINE                        Muscular tenderness not present                        Passive range of motion within normal limits.                       Active range of motion within normal limits                        Neck muscles supple. Spasm of muscles not present.                        No JVD present. No tracheal deviation, no erythema and normal range of motion                         Present. No thyromegaly present. Lymphadenopathy:                         No cervical adenopathy.                         No spinous process tenderness present. No rigidity.                       No edema and no erythema present. CHART REVIEW     Past Medical History:   Diagnosis Date    Colon polyps     Constipation     Heart murmur     Hemorrhoids     Mitral valve prolapse     Nausea & vomiting     \"VIOLENTLY SICK AFTER TUBAL LIGATION\"    Plantar fasciitis     Vestibular neuritis      Current Outpatient Prescriptions   Medication Sig    ergocalciferol (VITAMIN D2) 50,000 unit capsule Take 50,000 Units by mouth.  cycloSPORINE (RESTASIS MULTIDOSE) 0.05 % drop ophthalmic drops 1 Drop two (2) times a day.  etodolac (LODINE) 400 mg tablet Take 400 mg by mouth two (2) times daily (with meals).  coenzyme q10 (CO Q-10) 10 mg cap Take  by mouth.  red yeast rice extract 600 mg cap Take 600 mg by mouth now.  calcium-cholecalciferol, d3, (CALCIUM 600 + D) 600-125 mg-unit tab Take  by mouth.  cholecalciferol (VITAMIN D3) 1,000 unit tablet Take  by mouth daily.  famotidine (PEPCID) 40 mg tablet Take 1 Tab by mouth daily.     HYDROcodone-acetaminophen (NORCO) 5-325 mg per tablet Take  by mouth.  alendronate (FOSAMAX) 70 mg tablet Take  by mouth. No current facility-administered medications for this visit. Allergies   Allergen Reactions    Latex Other (comments)     \"YEAST INFECTION\"    Amoxicillin Other (comments)     Gave pt bad ha's    Flagyl [Metronidazole] Other (comments)     Pt states headaches and vomiting      Urised [Meth-Hyos-Atrp-M Blue-Ba-Phsal] Itching and Swelling     Past Surgical History:   Procedure Laterality Date    HX ANKLE FRACTURE TX      HX BREAST LUMPECTOMY      right    HX COLONOSCOPY      2010,2013    HX CYST REMOVAL  2010    right breast    HX GYN      hysterectomy    HX HEENT      tonselectomy    HX HEENT      lasik eye surgery    HX OTHER SURGICAL      cystoscopy    HX OTHER SURGICAL  2010    right ovary removed    HX TUBAL LIGATION       Social History     Occupational History    Not on file. Social History Main Topics    Smoking status: Never Smoker    Smokeless tobacco: Never Used    Alcohol use Yes      Comment: OCCASIONALLY    Drug use: No    Sexual activity: Not on file     Family History   Problem Relation Age of Onset    Cancer Sister     Cancer Father      colon    Cancer Maternal Grandmother     Hypertension Mother     Cancer Mother        REVIEW OF SYSTEMS : 8/15/2018  ALL BELOW ARE Negative except : SEE HPI       Constitutional: Negative for fever, chills and weight loss. Neg Weigh Loss  Cardiovascular: Negative for chest pain, claudication and leg swelling. SOB, PORTER   Gastrointestinal: Negative for  pain, N/V/D/C, Blood in stool or urine,dysuria, hematuria,        Incontinence, pelvic pain  Musculoskeletal: see HPI. Neurological: Negative for dizziness and weakness. Negative for headaches,Visual Changes, Confusion, Seizures,   Psychiatric/Behavioral: Negative for depression, memory loss and substance abuse.    Extremities:  Negative for  hair changes, rash or skin lesion changes. Hematologic: Negative for Bleeding problems, bruising, pallor or swollen lymph nodes. Peripheral Vascular: No calf pain, vascular vein tenderness to calf pain              No calf throbbing, posterior knee throbbing pain    DIAGNOSTIC IMAGING      Dictation on: 08/15/2018  1:29 PM by: Zenon Doshi [[id number]]         Written by Mika Spangler, as dictated by Dr. Radha Lu. I, Radha Lundy confirm that all documentation is accurate.

## 2018-08-15 NOTE — MR AVS SNAPSHOT
[address] [place] [address], [address] [facility] 
[phone] Patient: Mindy Tavarez MRN: [id number] NPO:6/41/2947 Visit Information Date & Time Provider Department Dept. Phone Encounter #  
 8/15/2018  1:00 PM Dyan Barajas, [address] [facility] [phone] [id number] Upcoming Health Maintenance Date Due Hepatitis C Screening 1965 DTaP/Tdap/Td series (1 - Tdap) 7/30/1984 BREAST CANCER SCRN MAMMOGRAM 7/30/2013 FOBT Q 1 YEAR AGE 50-75 7/30/2013 PAP AKA CERVICAL CYTOLOGY 2/1/2016 Influenza Age 5 to Adult 8/1/2018 Allergies as of 8/15/2018  Review Complete On: 8/15/2018 By: Dyan Barajas MD  
  
 Severity Noted Reaction Type Reactions Latex  02/12/2013    Other (comments) \"YEAST INFECTION\" Amoxicillin  03/11/2013    Other (comments) Gave pt bad ha's Flagyl [Metronidazole]  11/12/2016    Other (comments) Pt states headaches and vomiting Urised [Meth-hyos-atrp-m Blue-ba-phsal]  10/16/2012    Itching, Swelling Current Immunizations  Never Reviewed No immunizations on file. Not reviewed this visit You Were Diagnosed With   
  
 Codes Comments Closed fracture of distal end of right fibula and tibia with routine healing, subsequent encounter    -  Primary ICD-10-CM: S82.301D, Q17.158X ICD-9-CM: V54.16 Closed bimalleolar fracture of right ankle, sequela     ICD-10-CM: N09.345L 
ICD-9-CM: 905. 4 Chronic pain of right ankle     ICD-10-CM: M25.571, G89.29 ICD-9-CM: 719.47, 338.29 Pes planus of right foot     ICD-10-CM: M21.41 
ICD-9-CM: 214 Vitals BP Pulse Temp Resp Height(growth percentile) Weight(growth percentile) 114/69 64 96.8 °F (36 °C) (Oral) 16 5' 6\" (1.676 m) 153 lb 9.6 oz (69.7 kg) SpO2 BMI OB Status Smoking Status 98% 24.79 kg/m2 Hysterectomy Never Smoker BMI and BSA Data Body Mass Index Body Surface Area 24.79 kg/m 2 1.8 m 2 Preferred Pharmacy Pharmacy Name Phone [facility] [address] [place] [address], [address] [place] [address] Rd [phone] Your Updated Medication List  
  
   
This list is accurate as of 8/15/18  1:58 PM.  Always use your most recent med list.  
  
  
  
  
 alendronate 70 mg tablet Commonly known as:  FOSAMAX Take  by mouth. CALCIUM 600 + D 600-125 mg-unit Tab Generic drug:  calcium-cholecalciferol (d3) Take  by mouth. CO Q-10 10 mg Cap Generic drug:  coenzyme q10 Take  by mouth.  
  
 etodolac 400 mg tablet Commonly known as:  LODINE Take 400 mg by mouth two (2) times daily (with meals). famotidine 40 mg tablet Commonly known as:  PEPCID Take 1 Tab by mouth daily. NORCO 5-325 mg per tablet Generic drug:  HYDROcodone-acetaminophen Take  by mouth. red yeast rice extract 600 mg Cap Take 600 mg by mouth now. RESTASIS MULTIDOSE 0.05 % Drop ophthalmic drops Generic drug:  cycloSPORINE  
1 Drop two (2) times a day. VITAMIN D2 50,000 unit capsule Generic drug:  ergocalciferol Take 50,000 Units by mouth. VITAMIN D3 1,000 unit tablet Generic drug:  cholecalciferol Take  by mouth daily. We Performed the Following AMB POC XRAY, ANKLE; COMPLETE, 3+ VIE [13529 CPT(R)] Patient Instructions Please follow up in 3 months. You are advised to contact us if your condition worsens. Ankle Fracture: Rehab Exercises Your Care Instructions Here are some examples of typical rehabilitation exercises for your condition. Start each exercise slowly. Ease off the exercise if you start to have pain. Your doctor or physical therapist will tell you when you can start these exercises and which ones will work best for you. How to do the exercises Calf stretch (knee straight) For this exercise, you will need a towel. 1. Sit with your affected leg straight and supported on the floor. Your other leg should be bent, with that foot flat on the floor. 2. Place a towel around your affected foot just under the toes. 3. Hold one end of the towel in each hand, with your hands above your knees. 4. Pull back gently with the towel so that your foot stretches toward you. 5. Hold the position for at least 15 to 30 seconds. 6. Repeat 2 to 4 times a session, up to 5 sessions a day. Calf stretch (knee bent) For this exercise, you will need a towel. You will also need a pillow or foam roll. 1. Sit with your affected leg straight and supported on the floor. Your other leg should be bent, with that foot flat on the floor. 2. Place a pillow or foam roll under your affected leg. 3. Place a towel around your affected foot just under the toes. 4. Hold one end of the towel in each hand, with your hands above your knees. 5. Pull back gently with the towel so that your foot stretches toward you. 6. Hold the position for at least 15 to 30 seconds. 7. Repeat 2 to 4 times a session, up to 5 sessions a day. Ankle plantar flexion 1. Sit with your affected leg straight and supported on the floor. Your other leg should be bent, with that foot flat on the floor. 2. Keeping your affected leg straight, gently flex your foot downward so your toes are pointed away from your body. Then slowly relax your foot to the starting position. 3. Repeat 8 to 12 times. Ankle dorsiflexion 1. Sit with your affected leg straight and supported on the floor. Your other leg should be bent, with that foot flat on the floor. 2. Keeping your affected leg straight, gently flex your foot back toward your body so your toes point upward. Then slowly relax your foot to the starting position. 3. Repeat 8 to 12 times. Resisted ankle plantar flexion For the next four exercises, you will need elastic exercise material, such as surgical tubing or Thera-Band. 1. Sit with your affected leg straight and supported on the floor. Your other leg should be bent, with that foot flat on the floor. 2. Place an elastic band around your affected foot just under the toes. 3. Hold each end of the band in each hand, with your hands above your knees. 4. Keeping your affected leg straight, gently flex your foot downward so your toes are pointed away from your body. Then slowly relax your foot to the starting position. 5. Repeat 8 to 12 times. Resisted ankle dorsiflexion 1. Tie the ends of an exercise band together to form a loop. Attach one end of the loop to a secure object, like a table leg, or shut a door on it to hold it in place. (Or you can have someone hold one end of the loop to provide resistance.) 2. While sitting on the floor or in a chair, loop the other end of the band over the top of your affected foot. 3. Keeping your knee and leg straight, slowly flex your foot toward you to pull back on the exercise band, and then slowly relax. 4. Repeat 8 to 12 times. Resisted ankle inversion 1. Sit on the floor with your good leg crossed over your other leg. 2. Hold both ends of an exercise band and loop the band around the inside of your affected foot. Then press your good foot against the band. 3. Keeping your legs crossed, slowly push your affected foot against the band so that foot moves away from your good foot. Then slowly relax. 4. Repeat 8 to 12 times. Resisted ankle eversion 1. Sit on the floor with your legs straight. 2. Hold both ends of an exercise band and loop the band around the outside of your affected foot. Then press your good foot against the band. 3. Keeping your leg straight, slowly push your affected foot outward against the band and away from your good foot without letting your leg rotate. Then slowly relax. 4. Repeat 8 to 12 times. Ankle alphabet 1. Sit in a chair with your feet flat on the floor.  (You can also do this exercise lying on your back with your affected leg propped up on a pillow). 2. Lift the heel of your affected foot off the floor, and slowly trace the letters of the alphabet. Heel raises 1. Stand with your feet a few inches apart, with your hands lightly resting on a counter or chair in front of you. 2. Slowly raise your heels off the floor while keeping your knees straight. 3. Hold for about 6 seconds, then slowly lower your heels to the floor. 4. Do 8 to 12 repetitions several times during the day. Follow-up care is a key part of your treatment and safety. Be sure to make and go to all appointments, and call your doctor if you are having problems. It's also a good idea to know your test results and keep a list of the medicines you take. Where can you learn more? Go to http://angela-michelle.info/. Enter T800 in the search box to learn more about \"Ankle Fracture: Rehab Exercises. \" Current as of: November 29, 2017 Content Version: 11.7 © 2638-0733 [employer]. Care instructions adapted under license by [employer] (which disclaims liability or warranty for this information). If you have questions about a medical condition or this instruction, always ask your healthcare professional. [address] any warranty or liability for your use of this information. Introducing [facility]! Dear Svetlana Jarrell: Thank you for requesting a [facility] account. Our records indicate that you already have an active [facility] account. You can access your account anytime at [email or web address] Did you know that you can access your hospital and ER discharge instructions at any time in [facility]F? You can also review all of your test results from your hospital stay or ER visit. Additional Information If you have questions, please visit the Frequently Asked Questions section of the [employer] website at [email or web address] Remember, [employer] is NOT to be used for urgent needs. For medical emergencies, dial 911. Now available from your iPhone and Android! Please provide this summary of care documentation to your next provider. Your primary care clinician is listed as Khloe Hinson. If you have any questions after today's visit, please call [phone].

## 2018-09-05 ENCOUNTER — OFFICE VISIT (OUTPATIENT)
Dept: CARDIOLOGY CLINIC | Age: 53
End: 2018-09-05

## 2018-09-05 VITALS
DIASTOLIC BLOOD PRESSURE: 85 MMHG | BODY MASS INDEX: 25.07 KG/M2 | HEIGHT: 66 IN | SYSTOLIC BLOOD PRESSURE: 125 MMHG | WEIGHT: 156 LBS | HEART RATE: 73 BPM

## 2018-09-05 DIAGNOSIS — R06.02 SOB (SHORTNESS OF BREATH): ICD-10-CM

## 2018-09-05 DIAGNOSIS — R00.2 PALPITATIONS: Primary | ICD-10-CM

## 2018-09-05 DIAGNOSIS — R42 DIZZINESS: ICD-10-CM

## 2018-09-05 NOTE — MR AVS SNAPSHOT
Tahira Doll 
 
 
 [address] [facility] 
[phone] Patient: Marci Renner MRN: [id number] Visit Information Date & Time Provider Department Dept. Phone Encounter #  
 9/5/2018 11:15 AM Anita Huffman MD [facility] [place] [id number] [id number] Follow-up Instructions Return in about 1 month (around 10/5/2018). Your Appointments 10/11/2018  1:30 PM  
PROCEDURE with CA ECHO [facility] [place] ([address] [facility]) Appt Note: Echo/M[address] [place] [address]  
  
   
 [address] [address] [address] [address]  
  
    
 10/24/2018 11:15 AM  
Office Visit with Anita Huffman MD  
[facility] [place] ([facility]) Appt Note: Post Preventice/Echo follow up  
 [address] [place] [address]  
  
   
 [address] [id number] [address] [address]  
  
    
 11/14/2018  1:00 PM  
Follow Up with Leopoldo Jacobs, MD  
4 [facility], [address] [facility] [place] [address] ([facility]) Appt Note: rt ankle 3 mo fu  
 27 [facility], [address] 0[address] [facility]  
[phone] [facility] [place] Upcoming Health Maintenance Date Due Hepatitis C Screening 1965 DTaP/Tdap/Td series (1 - Tdap) 7/30/1984 BREAST CANCER SCRN MAMMOGRAM 7/30/2013 FOBT Q 1 YEAR AGE 50-75 7/30/2013 PAP AKA CERVICAL CYTOLOGY 2/1/2016 Influenza Age 5 to Adult 8/1/2018 Allergies as of 9/5/2018  Review Complete On: 9/5/2018 By: Za Foote Severity Noted Reaction Type Reactions Latex  02/12/2013    Other (comments) \"YEAST INFECTION\" Amoxicillin  03/11/2013    Other (comments) Gave pt bad ha's Flagyl [Metronidazole]  11/12/2016    Other (comments) Pt states headaches and vomiting Urised [Meth-hyos-atrp-m Blue-ba-phsal]  10/16/2012    Itching, Swelling Current Immunizations  Never Reviewed No immunizations on file. Not reviewed this visit You Were Diagnosed With   
  
 Codes Comments Palpitations    -  Primary ICD-10-CM: R00.2 ICD-9-CM: 785.1 SOB (shortness of breath)     ICD-10-CM: R06.02 
ICD-9-CM: 786.05 Dizziness     ICD-10-CM: R79 ICD-9-CM: 780.4 Vitals BP Pulse Height(growth percentile) Weight(growth percentile) BMI OB Status 125/85 (BP 1 Location: Right arm, BP Patient Position: Standing) 73 5' 6\" (1.676 m) 156 lb (70.8 kg) 25.18 kg/m2 Hysterectomy Smoking Status Never Smoker Vitals History BMI and BSA Data Body Mass Index Body Surface Area  
 25.18 kg/m 2 1.82 m 2 Preferred Pharmacy Pharmacy Name Phone [id number] [address] [place] [address] [phone] Your Updated Medication List  
  
   
This list is accurate as of 9/5/18 12:29 PM.  Always use your most recent med list.  
  
  
  
  
 alendronate 70 mg tablet Commonly known as:  FOSAMAX Take  by mouth. CALCIUM 600 + D 600-125 mg-unit Tab Generic drug:  calcium-cholecalciferol (d3) Take  by mouth. CO Q-10 10 mg Cap Generic drug:  coenzyme q10 Take  by mouth.  
  
 etodolac 400 mg tablet Commonly known as:  LODINE Take 400 mg by mouth two (2) times daily (with meals). famotidine 40 mg tablet Commonly known as:  PEPCID Take 1 Tab by mouth daily. NORCO 5-325 mg per tablet Generic drug:  HYDROcodone-acetaminophen Take  by mouth. red yeast rice extract 600 mg Cap Take 600 mg by mouth now. RESTASIS MULTIDOSE 0.05 % Drop ophthalmic drops Generic drug:  cycloSPORINE  
1 Drop two (2) times a day. VITAMIN D2 50,000 unit capsule Generic drug:  ergocalciferol Take 50,000 Units by mouth. VITAMIN D3 1,000 unit tablet Generic drug:  cholecalciferol Take  by mouth daily. We Performed the Following MA EXTERNAL MOBILE CV TELEMETRY W/I&REPORT UP TO 30 DAYS [06007 CPT(R)] Follow-up Instructions Return in about 1 month (around 10/5/2018). To-Do List   
 10/05/2018 Echocardiography:  ECHO ADULT COMPLETE Patient Instructions Dizziness: Care Instructions Your Care Instructions Dizziness is the feeling of unsteadiness or fuzziness in your head. It is different than having vertigo, which is a feeling that the room is spinning or that you are moving or falling. It is also different from lightheadedness, which is the feeling that you are about to faint. It can be hard to know what causes dizziness. Some people feel dizzy when they have migraine headaches. Sometimes bouts of flu can make you feel dizzy. Some medical conditions, such as heart problems or high blood pressure, can make you feel dizzy. Many medicines can cause dizziness, including medicines for high blood pressure, pain, or anxiety. If a medicine causes your symptoms, your doctor may recommend that you stop or change the medicine. If it is a problem with your heart, you may need medicine to help your heart work better. If there is no clear reason for your symptoms, your doctor may suggest watching and waiting for a while to see if the dizziness goes away on its own. Follow-up care is a key part of your treatment and safety. Be sure to make and go to all appointments, and call your doctor if you are having problems. It's also a good idea to know your test results and keep a list of the medicines you take. How can you care for yourself at home? · If your doctor recommends or prescribes medicine, take it exactly as directed. Call your doctor if you think you are having a problem with your medicine. · Do not drive while you feel dizzy. · Try to prevent falls. Steps you can take include: ¨ Using nonskid mats, adding grab bars near the tub, and using night-lights. ¨ Clearing your home so that walkways are free of anything you might trip on. ¨ Letting family and friends know that you have been feeling dizzy. This will help them know how to help you. When should you call for help? Call 911 anytime you think you may need emergency care. For example, call if: 
  · You passed out (lost consciousness).  
  · You have dizziness along with symptoms of a heart attack. These may include: ¨ Chest pain or pressure, or a strange feeling in the chest. 
¨ Sweating. ¨ Shortness of breath. ¨ Nausea or vomiting. ¨ Pain, pressure, or a strange feeling in the back, neck, jaw, or upper belly or in one or both shoulders or arms. ¨ Lightheadedness or sudden weakness. ¨ A fast or irregular heartbeat.  
  · You have symptoms of a stroke. These may include: 
¨ Sudden numbness, tingling, weakness, or loss of movement in your face, arm, or leg, especially on only one side of your body. ¨ Sudden vision changes. ¨ Sudden trouble speaking. ¨ Sudden confusion or trouble understanding simple statements. ¨ Sudden problems with walking or balance. ¨ A sudden, severe headache that is different from past headaches.  
 Call your doctor now or seek immediate medical care if: 
  · You feel dizzy and have a fever, headache, or ringing in your ears.  
  · You have new or increased nausea and vomiting.  
  · Your dizziness does not go away or comes back.  
 Watch closely for changes in your health, and be sure to contact your doctor if: 
  · You do not get better as expected. Where can you learn more? Go to http://angela-michelle.info/. Enter J055 in the search box to learn more about \"Dizziness: Care Instructions. \" Current as of: November 20, 2017 Content Version: 11.7 © 4602-0296 [employer].  Care instructions adapted under license by [address] (which disclaims liability or warranty for this information). If you have questions about a medical condition or this instruction, always ask your healthcare professional. [address] any warranty or liability for your use of this information. Introducing [facility]! Dear Gregorio Bailey: Thank you for requesting a [facility] account. Our records indicate that you already have an active [facility] account. You can access your account anytime at [email or web address] Did you know that you can access your hospital and ER discharge instructions at any time in [facility]? You can also review all of your test results from your hospital stay or ER visit. Additional Information If you have questions, please visit the Frequently Asked Questions section of the [facility] website at [email or web address] Remember, [facility] is NOT to be used for urgent needs. For medical emergencies, dial 911. Now available from your iPhone and Android! Please provide this summary of care documentation to your next provider. Your primary care clinician is listed as Adia Richards. If you have any questions after today's visit, please call [phone].

## 2018-09-05 NOTE — PATIENT INSTRUCTIONS
Dizziness: Care Instructions  Your Care Instructions  Dizziness is the feeling of unsteadiness or fuzziness in your head. It is different than having vertigo, which is a feeling that the room is spinning or that you are moving or falling. It is also different from lightheadedness, which is the feeling that you are about to faint. It can be hard to know what causes dizziness. Some people feel dizzy when they have migraine headaches. Sometimes bouts of flu can make you feel dizzy. Some medical conditions, such as heart problems or high blood pressure, can make you feel dizzy. Many medicines can cause dizziness, including medicines for high blood pressure, pain, or anxiety. If a medicine causes your symptoms, your doctor may recommend that you stop or change the medicine. If it is a problem with your heart, you may need medicine to help your heart work better. If there is no clear reason for your symptoms, your doctor may suggest watching and waiting for a while to see if the dizziness goes away on its own. Follow-up care is a key part of your treatment and safety. Be sure to make and go to all appointments, and call your doctor if you are having problems. It's also a good idea to know your test results and keep a list of the medicines you take. How can you care for yourself at home? · If your doctor recommends or prescribes medicine, take it exactly as directed. Call your doctor if you think you are having a problem with your medicine. · Do not drive while you feel dizzy. · Try to prevent falls. Steps you can take include:  ¨ Using nonskid mats, adding grab bars near the tub, and using night-lights. ¨ Clearing your home so that walkways are free of anything you might trip on. ¨ Letting family and friends know that you have been feeling dizzy. This will help them know how to help you. When should you call for help? Call 911 anytime you think you may need emergency care.  For example, call if:    · You passed out (lost consciousness).     · You have dizziness along with symptoms of a heart attack. These may include:  ¨ Chest pain or pressure, or a strange feeling in the chest.  ¨ Sweating. ¨ Shortness of breath. ¨ Nausea or vomiting. ¨ Pain, pressure, or a strange feeling in the back, neck, jaw, or upper belly or in one or both shoulders or arms. ¨ Lightheadedness or sudden weakness. ¨ A fast or irregular heartbeat.     · You have symptoms of a stroke. These may include:  ¨ Sudden numbness, tingling, weakness, or loss of movement in your face, arm, or leg, especially on only one side of your body. ¨ Sudden vision changes. ¨ Sudden trouble speaking. ¨ Sudden confusion or trouble understanding simple statements. ¨ Sudden problems with walking or balance. ¨ A sudden, severe headache that is different from past headaches.    Call your doctor now or seek immediate medical care if:    · You feel dizzy and have a fever, headache, or ringing in your ears.     · You have new or increased nausea and vomiting.     · Your dizziness does not go away or comes back.    Watch closely for changes in your health, and be sure to contact your doctor if:    · You do not get better as expected. Where can you learn more? Go to http://angela-michelle.info/. Enter A201 in the search box to learn more about \"Dizziness: Care Instructions. \"  Current as of: November 20, 2017  Content Version: 11.7  © 5541-0709 [employer]. Care instructions adapted under license by [employer] (which disclaims liability or warranty for this information). If you have questions about a medical condition or this instruction, always ask your healthcare professional. [place] [address] any warranty or liability for your use of this information.

## 2018-09-09 NOTE — PROGRESS NOTES
HISTORY OF PRESENT ILLNESS  Delfin Calvillo is a 52 y.o. female. New Patient   The history is provided by the patient. This is a chronic problem. The problem occurs every several days. The problem has been gradually worsening. Associated symptoms include shortness of breath. Pertinent negatives include no chest pain, no abdominal pain and no headaches. Irregular Heart Beat    The history is provided by the patient. This is a chronic problem. The problem has been gradually worsening. The problem occurs daily. Associated symptoms include malaise/fatigue and shortness of breath. Pertinent negatives include no diaphoresis, no fever, no chest pain, no claudication, no orthopnea, no PND, no abdominal pain, no nausea, no vomiting, no headaches, no dizziness, no weakness, no cough, no hemoptysis and no sputum production. Risk factors include no risk factors. Shortness of Breath   The history is provided by the patient. This is a chronic problem. The problem occurs intermittently. The problem has not changed since onset. Pertinent negatives include no fever, no headaches, no ear pain, no neck pain, no cough, no sputum production, no hemoptysis, no wheezing, no PND, no orthopnea, no chest pain, no vomiting, no abdominal pain, no rash, no leg swelling and no claudication. Associated medical issues do not include CAD or heart failure. Review of Systems   Constitutional: Positive for malaise/fatigue. Negative for chills, diaphoresis, fever and weight loss. HENT: Negative for congestion, ear discharge, ear pain, hearing loss, nosebleeds and tinnitus. Eyes: Negative for blurred vision. Respiratory: Positive for shortness of breath. Negative for cough, hemoptysis, sputum production, wheezing and stridor. Cardiovascular: Positive for palpitations. Negative for chest pain, orthopnea, claudication, leg swelling and PND. Gastrointestinal: Negative for abdominal pain, heartburn, nausea and vomiting. Musculoskeletal: Negative for myalgias and neck pain. Skin: Negative for itching and rash. Neurological: Negative for dizziness, tingling, tremors, focal weakness, loss of consciousness, weakness and headaches. Psychiatric/Behavioral: Negative for depression and suicidal ideas. Family History   Problem Relation Age of Onset    Cancer Sister     Cancer Father      colon    Cancer Maternal Grandmother     Hypertension Mother     Cancer Mother        Past Medical History:   Diagnosis Date    Colon polyps     Constipation     Heart murmur     Hemorrhoids     Mitral valve prolapse     Nausea & vomiting     \"VIOLENTLY SICK AFTER TUBAL LIGATION\"    Plantar fasciitis     Vestibular neuritis        Past Surgical History:   Procedure Laterality Date    HX ANKLE FRACTURE TX      HX BREAST LUMPECTOMY      right    HX COLONOSCOPY      2010,2013    HX CYST REMOVAL  2010    right breast    HX GYN      hysterectomy    HX HEENT      tonselectomy    HX HEENT      lasik eye surgery    HX OTHER SURGICAL      cystoscopy    HX OTHER SURGICAL  2010    right ovary removed    HX TUBAL LIGATION         Social History   Substance Use Topics    Smoking status: Never Smoker    Smokeless tobacco: Never Used    Alcohol use Yes      Comment: OCCASIONALLY       Allergies   Allergen Reactions    Latex Other (comments)     \"YEAST INFECTION\"    Amoxicillin Other (comments)     Gave pt bad ha's    Flagyl [Metronidazole] Other (comments)     Pt states headaches and vomiting      Urised [Meth-Hyos-Atrp-M Blue-Ba-Phsal] Itching and Swelling       Outpatient Prescriptions Marked as Taking for the 9/5/18 encounter (Office Visit) with Sukumar Rowan MD   Medication Sig Dispense Refill    ergocalciferol (VITAMIN D2) 50,000 unit capsule Take 50,000 Units by mouth.  cycloSPORINE (RESTASIS MULTIDOSE) 0.05 % drop ophthalmic drops 1 Drop two (2) times a day.       etodolac (LODINE) 400 mg tablet Take 400 mg by mouth two (2) times daily (with meals). 60 Tab 0    red yeast rice extract 600 mg cap Take 600 mg by mouth now.  calcium-cholecalciferol, d3, (CALCIUM 600 + D) 600-125 mg-unit tab Take  by mouth. Visit Vitals    /85 (BP 1 Location: Right arm, BP Patient Position: Standing)    Pulse 73    Ht 5' 6\" (1.676 m)    Wt 70.8 kg (156 lb)    BMI 25.18 kg/m2     Physical Exam   Constitutional: She is oriented to person, place, and time. She appears well-developed and well-nourished. No distress. HENT:   Head: Atraumatic. Mouth/Throat: No oropharyngeal exudate. Eyes: Conjunctivae are normal. Right eye exhibits no discharge. Left eye exhibits no discharge. No scleral icterus. Neck: Normal range of motion. Neck supple. No JVD present. No tracheal deviation present. No thyromegaly present. Cardiovascular: Normal rate and regular rhythm. Exam reveals no gallop. No murmur heard. Pulmonary/Chest: Effort normal and breath sounds normal. No stridor. She has no wheezes. She has no rales. Abdominal: Soft. There is no tenderness. There is no rebound and no guarding. Musculoskeletal: Normal range of motion. She exhibits no edema or tenderness. Lymphadenopathy:     She has no cervical adenopathy. Neurological: She is alert and oriented to person, place, and time. She exhibits normal muscle tone. Skin: Skin is warm. She is not diaphoretic. Psychiatric: She has a normal mood and affect. Her behavior is normal.     ekg sinus rhythm with no acute st-t changes  ASSESSMENT and PLAN    ICD-10-CM ICD-9-CM    1. Palpitations R00.2 785.1 ND EXTERNAL MOBILE CV TELEMETRY W/I&REPORT UP TO 30 DAYS      CANCELED: TSH 3RD GENERATION   2. SOB (shortness of breath) R06.02 786.05 ECHO ADULT COMPLETE   3. Dizziness R42 780.4      Orders Placed This Encounter    ND EXTERNAL MOBILE CV TELEMETRY W/I&REPORT UP TO 30 DAYS     Follow-up Disposition:  Return in about 1 month (around 10/5/2018).   current treatment plan is effective, no change in therapy. Patient seen for sob and palpitations  No syncope  No orthostatic hypotension in the clinic today  Will obtain echo and cardiac monitor.   F/u in 1 month

## 2018-09-20 ENCOUNTER — TELEPHONE (OUTPATIENT)
Dept: CARDIOLOGY CLINIC | Age: 53
End: 2018-09-20

## 2018-09-20 NOTE — TELEPHONE ENCOUNTER
Patient called and complained monitor was keeping her up a night. Patient states she can't sleep at all it beeps all the time. Patient states she only have symptoms only when in bed and lying on left side, states she can't lay on back for medical reason and when on right side it vibrates all night keeping her awake. Patient wants to know if she only keep monitor on for 15 days and not 30 days.  Please advise

## 2018-09-21 NOTE — TELEPHONE ENCOUNTER
Called and spoke to patient regarding monitor, Per Dr. Treva Montelongo  Its okay to wear monitor for 15 days. If any questions to call the office.

## 2018-09-28 DIAGNOSIS — M25.571 ACUTE RIGHT ANKLE PAIN: ICD-10-CM

## 2018-09-28 DIAGNOSIS — S82.841S CLOSED BIMALLEOLAR FRACTURE OF RIGHT ANKLE, SEQUELA: ICD-10-CM

## 2018-09-28 NOTE — TELEPHONE ENCOUNTER
Last Visit: 08/15/2018 with MD Simpson    Next Appointment: 11/14/2018 with MD Simpson   Previous Refill Encounters: 06/11/2018 per MD Simpson #[id number]    Requested Prescriptions     Pending Prescriptions Disp Refills    etodolac (LODINE) 400 mg tablet 60 Tab 0     Sig: Take 400 mg by mouth two (2) times daily (with meals).

## 2018-10-01 RX ORDER — ETODOLAC 400 MG/1
400 TABLET, FILM COATED ORAL 2 TIMES DAILY WITH MEALS
Qty: 60 TAB | Refills: 0 | OUTPATIENT
Start: 2018-10-01

## 2018-10-05 DIAGNOSIS — S82.841S CLOSED BIMALLEOLAR FRACTURE OF RIGHT ANKLE, SEQUELA: ICD-10-CM

## 2018-10-05 DIAGNOSIS — M25.571 ACUTE RIGHT ANKLE PAIN: ICD-10-CM

## 2018-10-08 RX ORDER — ETODOLAC 400 MG/1
TABLET, FILM COATED ORAL
Qty: 60 TAB | Refills: 0 | Status: SHIPPED | OUTPATIENT
Start: 2018-10-08 | End: 2019-09-03 | Stop reason: SDUPTHER

## 2018-10-11 ENCOUNTER — ANESTHESIA EVENT (OUTPATIENT)
Dept: ENDOSCOPY | Age: 53
End: 2018-10-11
Payer: COMMERCIAL

## 2018-10-12 ENCOUNTER — ANESTHESIA (OUTPATIENT)
Dept: ENDOSCOPY | Age: 53
End: 2018-10-12
Payer: COMMERCIAL

## 2018-10-12 ENCOUNTER — HOSPITAL ENCOUNTER (OUTPATIENT)
Age: 53
Setting detail: OUTPATIENT SURGERY
Discharge: HOME | End: 2018-10-12
Attending: COLON & RECTAL SURGERY | Admitting: COLON & RECTAL SURGERY
Payer: COMMERCIAL

## 2018-10-12 VITALS
OXYGEN SATURATION: 100 % | RESPIRATION RATE: 18 BRPM | DIASTOLIC BLOOD PRESSURE: 59 MMHG | SYSTOLIC BLOOD PRESSURE: 132 MMHG | TEMPERATURE: 98.5 F | HEART RATE: 59 BPM | BODY MASS INDEX: 25.83 KG/M2 | HEIGHT: 65 IN | WEIGHT: 155 LBS

## 2018-10-12 PROCEDURE — 76040000007: Performed by: COLON & RECTAL SURGERY

## 2018-10-12 PROCEDURE — 74011250636 HC RX REV CODE- 250/636

## 2018-10-12 PROCEDURE — 74011250636 HC RX REV CODE- 250/636: Performed by: NURSE ANESTHETIST, CERTIFIED REGISTERED

## 2018-10-12 PROCEDURE — 76060000032 HC ANESTHESIA 0.5 TO 1 HR: Performed by: COLON & RECTAL SURGERY

## 2018-10-12 RX ORDER — ONDANSETRON 2 MG/ML
INJECTION INTRAMUSCULAR; INTRAVENOUS AS NEEDED
Status: DISCONTINUED | OUTPATIENT
Start: 2018-10-12 | End: 2018-10-12 | Stop reason: HOSPADM

## 2018-10-12 RX ORDER — PROPOFOL 10 MG/ML
INJECTION, EMULSION INTRAVENOUS AS NEEDED
Status: DISCONTINUED | OUTPATIENT
Start: 2018-10-12 | End: 2018-10-12 | Stop reason: HOSPADM

## 2018-10-12 RX ORDER — SODIUM CHLORIDE 0.9 % (FLUSH) 0.9 %
5-10 SYRINGE (ML) INJECTION AS NEEDED
Status: DISCONTINUED | OUTPATIENT
Start: 2018-10-12 | End: 2018-10-12 | Stop reason: HOSPADM

## 2018-10-12 RX ORDER — LIDOCAINE HYDROCHLORIDE 10 MG/ML
0.1 INJECTION, SOLUTION EPIDURAL; INFILTRATION; INTRACAUDAL; PERINEURAL AS NEEDED
Status: DISCONTINUED | OUTPATIENT
Start: 2018-10-12 | End: 2018-10-12 | Stop reason: HOSPADM

## 2018-10-12 RX ORDER — SODIUM CHLORIDE, SODIUM LACTATE, POTASSIUM CHLORIDE, CALCIUM CHLORIDE 600; 310; 30; 20 MG/100ML; MG/100ML; MG/100ML; MG/100ML
75 INJECTION, SOLUTION INTRAVENOUS CONTINUOUS
Status: DISCONTINUED | OUTPATIENT
Start: 2018-10-12 | End: 2018-10-12 | Stop reason: HOSPADM

## 2018-10-12 RX ORDER — INSULIN LISPRO 100 [IU]/ML
INJECTION, SOLUTION INTRAVENOUS; SUBCUTANEOUS ONCE
Status: DISCONTINUED | OUTPATIENT
Start: 2018-10-12 | End: 2018-10-12 | Stop reason: HOSPADM

## 2018-10-12 RX ORDER — LIDOCAINE HYDROCHLORIDE 20 MG/ML
INJECTION, SOLUTION EPIDURAL; INFILTRATION; INTRACAUDAL; PERINEURAL AS NEEDED
Status: DISCONTINUED | OUTPATIENT
Start: 2018-10-12 | End: 2018-10-12 | Stop reason: HOSPADM

## 2018-10-12 RX ORDER — SODIUM CHLORIDE, SODIUM LACTATE, POTASSIUM CHLORIDE, CALCIUM CHLORIDE 600; 310; 30; 20 MG/100ML; MG/100ML; MG/100ML; MG/100ML
INJECTION, SOLUTION INTRAVENOUS
Status: DISCONTINUED | OUTPATIENT
Start: 2018-10-12 | End: 2018-10-12 | Stop reason: HOSPADM

## 2018-10-12 RX ORDER — SODIUM CHLORIDE 0.9 % (FLUSH) 0.9 %
5-10 SYRINGE (ML) INJECTION EVERY 8 HOURS
Status: DISCONTINUED | OUTPATIENT
Start: 2018-10-12 | End: 2018-10-12 | Stop reason: HOSPADM

## 2018-10-12 RX ADMIN — LIDOCAINE HYDROCHLORIDE 30 MG: 20 INJECTION, SOLUTION EPIDURAL; INFILTRATION; INTRACAUDAL; PERINEURAL at 13:02

## 2018-10-12 RX ADMIN — PROPOFOL 50 MG: 10 INJECTION, EMULSION INTRAVENOUS at 13:12

## 2018-10-12 RX ADMIN — SODIUM CHLORIDE, SODIUM LACTATE, POTASSIUM CHLORIDE, CALCIUM CHLORIDE: 600; 310; 30; 20 INJECTION, SOLUTION INTRAVENOUS at 12:59

## 2018-10-12 RX ADMIN — ONDANSETRON 4 MG: 2 INJECTION INTRAMUSCULAR; INTRAVENOUS at 13:04

## 2018-10-12 RX ADMIN — SODIUM CHLORIDE, SODIUM LACTATE, POTASSIUM CHLORIDE, AND CALCIUM CHLORIDE 75 ML/HR: 600; 310; 30; 20 INJECTION, SOLUTION INTRAVENOUS at 12:16

## 2018-10-12 RX ADMIN — PROPOFOL 80 MG: 10 INJECTION, EMULSION INTRAVENOUS at 13:02

## 2018-10-12 RX ADMIN — FAMOTIDINE 20 MG: 10 INJECTION INTRAVENOUS at 12:16

## 2018-10-12 RX ADMIN — PROPOFOL 30 MG: 10 INJECTION, EMULSION INTRAVENOUS at 13:05

## 2018-10-12 RX ADMIN — PROPOFOL 50 MG: 10 INJECTION, EMULSION INTRAVENOUS at 13:08

## 2018-10-12 NOTE — ANESTHESIA POSTPROCEDURE EVALUATION
Post-Anesthesia Evaluation and Assessment Patient: John Chen MRN: [id number]  SSN: [id number] YOB: 1965  Age: 52 y.o. Sex: female Cardiovascular Function/Vital Signs Visit Vitals  /59  Pulse (!) 59  Temp 36.9 °C (98.5 °F)  Resp 18  Ht 5' 5\" (1.651 m)  Wt 70.3 kg (155 lb)  SpO2 100%  Breastfeeding No  
 BMI 25.79 kg/m2 Patient is status post MAC anesthesia for Procedure(s): 
COLONOSCOPY. Nausea/Vomiting: None Postoperative hydration reviewed and adequate. Pain: 
Pain Scale 1: Numeric (0 - 10) (10/12/18 1341) Pain Intensity 1: 0 (10/12/18 1341) Managed Neurological Status: At baseline Mental Status and Level of Consciousness: Arousable Pulmonary Status:  
O2 Device: Room air (10/12/18 1341) Adequate oxygenation and airway patent Complications related to anesthesia: None Post-anesthesia assessment completed. No concerns Signed By: Seamus Garcia MD   
 October 12, 2018

## 2018-10-12 NOTE — H&P
HPI: Ana Cardona is a 52 y.o. female presenting with chief complain of [facility] of colorectal cancer Past Medical History:  
Diagnosis Date  Colon polyps  Constipation  Heart murmur  Hemorrhoids  Mitral valve prolapse  Nausea & vomiting \"VIOLENTLY SICK AFTER TUBAL LIGATION\"  Plantar fasciitis  Vestibular neuritis Past Surgical History:  
Procedure Laterality Date  HX ANKLE FRACTURE TX    
 HX BREAST LUMPECTOMY    
 right  HX COLONOSCOPY    
 [id number]  HX CYST REMOVAL  2010  
 right breast  
 HX GYN    
 hysterectomy  HX HEENT    
 tonselectomy  HX HEENT    
 lasik eye surgery  HX OTHER SURGICAL    
 cystoscopy  HX OTHER SURGICAL  2010  
 right ovary removed  HX TUBAL LIGATION Family History Problem Relation Age of Onset  Cancer Sister  Cancer Father   
  colon  Cancer Maternal Grandmother  Hypertension Mother  Cancer Mother Social History Social History  Marital status: SINGLE Spouse name: N/A  
 Number of children: N/A  
 Years of education: N/A Social History Main Topics  Smoking status: Never Smoker  Smokeless tobacco: Never Used  Alcohol use Yes Comment: OCCASIONALLY  Drug use: No  
 Sexual activity: Not Asked Other Topics Concern  None Social History Narrative Review of Systems - neg Outpatient Prescriptions Marked as Taking for the 10/12/18 encounter [facility] Encounter) Medication Sig Dispense Refill  etodolac (LODINE) 400 mg tablet TAKE 1 TABLET BY MOUTH 2 TIMES A DAY WITH MEALS 60 Tab 0  
 red yeast rice extract 600 mg cap Take 600 mg by mouth now. Allergies Allergen Reactions  Latex Other (comments) \"YEAST INFECTION\"  Wasp [Venom-Wasp] Anaphylaxis  Amoxicillin Other (comments) Gave pt bad ha's  Flagyl [Metronidazole] Other (comments) Pt states headaches and vomiting  Urised [Meth-Hyos-Atrp-M Blue-Ba-Phsal] Itching and Swelling Vitals:  
 10/10/18 1114 10/12/18 1210 BP:  124/78 Pulse:  63 Resp:  16 Temp:  98.4 °F (36.9 °C) SpO2:  100% Weight: 70.3 kg (155 lb) 70.3 kg (155 lb) Height: 5' 6\" (1.676 m) 5' 5\" (1.651 m) Physical Exam  
Constitutional: She appears well-developed and well-nourished. HENT:  
Head: Normocephalic and atraumatic. Eyes: Conjunctivae and EOM are normal.  
Abdominal: Soft. She exhibits no distension. There is no tenderness. Musculoskeletal: Normal range of motion. Lymphadenopathy:  
  She has no cervical adenopathy. Right: No inguinal adenopathy present. Left: No inguinal adenopathy present. Neurological: She exhibits normal muscle tone. Skin: Skin is warm and dry. No rash noted. Psychiatric: She has a normal mood and affect. Her speech is normal.  
 
 
Assessment / Plan 
 
colonoscopy The diagnoses and plan were discussed with the patient. All questions answered. Plan of care agreed to by all concerned.

## 2018-10-12 NOTE — ANESTHESIA PREPROCEDURE EVALUATION
Anesthetic History No history of anesthetic complications PONV Review of Systems / Medical History Patient summary reviewed and pertinent labs reviewed Pulmonary Within defined limits Neuro/Psych Within defined limits Cardiovascular Within defined limits Exercise tolerance: >4 METS Comments: H/o panic attack vs palpitation vs MI as per pt. Pt is f/u by cardiology on heart monitor for 30 days. GI/Hepatic/Renal 
Within defined limits Endo/Other Within defined limits Comments: Vestibular neuritis MVP Other Findings Physical Exam 
 
Airway Mallampati: II 
TM Distance: 4 - 6 cm Neck ROM: normal range of motion Mouth opening: Normal 
 
 Cardiovascular Regular rate and rhythm,  S1 and S2 normal,  no murmur, click, rub, or gallop Rhythm: regular Rate: normal 
 
 
 
 Dental 
 
Dentition: Lower dentition intact and Upper dentition intact Pulmonary Breath sounds clear to auscultation Abdominal 
GI exam deferred Other Findings Anesthetic Plan ASA: 2 Anesthesia type: MAC Induction: Intravenous Anesthetic plan and risks discussed with: Patient

## 2018-10-12 NOTE — PERIOP NOTES
Patient ambulated to and from the restroom with minimal assistance. Patient tolerated procedure well.

## 2018-10-12 NOTE — DISCHARGE INSTRUCTIONS
From Dr. Temo Mckeon: FOLLOW UP VISIT Appointment in: Other (Specify) Repeat colonoscopy in 5 years. Colonoscopy: What to Expect at [address] [place] [address]  After you have a colonoscopy, you will stay at the clinic for 1 to 2 hours until the medicines wear off. Then you can go home. But you will need to arrange for a ride. Your doctor will tell you when you can eat and do your other usual activities. Your doctor will talk to you about when you will need your next colonoscopy. Your doctor can help you decide how often you need to be checked. This will depend on the results of your test and your risk for colorectal cancer. After the test, you may be bloated or have gas pains. You may need to pass gas. If a biopsy was done or a polyp was removed, you may have streaks of blood in your stool (feces) for a few days. This care sheet gives you a general idea about how long it will take for you to recover. But each person recovers at a different pace. Follow the steps below to get better as quickly as possible. How can you care for yourself at home? Activity  · Rest when you feel tired. · You can do your normal activities when it feels okay to do so. Diet  · Follow your doctor's directions for eating. · Unless your doctor has told you not to, drink plenty of fluids. This helps to replace the fluids that were lost during the colon prep. · Do not drink alcohol. Medicines  · If polyps were removed or a biopsy was done during the test, your doctor may tell you not to take aspirin or other anti-inflammatory medicines for a few days. These include ibuprofen (Advil, Motrin) and naproxen (Aleve). Other instructions  · For your safety, do not drive or operate machinery until the medicine wears off and you can think clearly. Your doctor may tell you not to drive or operate machinery until the day after your test.  · Do not sign legal documents or make major decisions until the medicine wears off and you can think clearly.  The anesthesia can make it hard for you to fully understand what you are agreeing to. Follow-up care is a key part of your treatment and safety. Be sure to make and go to all appointments, and call your doctor if you are having problems. It's also a good idea to know your test results and keep a list of the medicines you take. When should you call for help? Call 911 anytime you think you may need emergency care. For example, call if:  · You passed out (lost consciousness). · You pass maroon or bloody stools. · You have severe belly pain. Call your doctor now or seek immediate medical care if:  · Your stools are black and tarlike. · Your stools have streaks of blood, but you did not have a biopsy or any polyps removed. · You have belly pain, or your belly is swollen and firm. · You vomit. · You have a fever. · You are very dizzy. Watch closely for changes in your health, and be sure to contact your doctor if you have any problems. Where can you learn more? Go to [employer].be  Enter E264 in the search box to learn more about \"Colonoscopy: What to Expect at Home. \"   © 0356-1198 Healthwise, Incorporated. Care instructions adapted under license by [employer] (which disclaims liability or warranty for this information). This care instruction is for use with your licensed healthcare professional. If you have questions about a medical condition or this instruction, always ask your healthcare professional. [place] [address] any warranty or liability for your use of this information. Content Version: 25.3.325658; Current as of: November 14, 2014      DISCHARGE SUMMARY from Nurse     POST-PROCEDURE INSTRUCTIONS:    Call your Physician if you:  ? Observe any excess bleeding. ? Develop a temperature over 100.5o F.  ? Experience abdominal, shoulder or chest pain. ?  Notice any signs of decreased circulation or nerve impairment to an extremity such as a change in color, persistent numbness, tingling, coldness or increase in pain. ? Vomit blood or you have nausea and vomiting lasting longer than 4 hours. ? Are unable to take medications. ? Are unable to urinate within 8 hours after discharge following general anesthesia or intravenous sedation. For the next 24 hours after receiving general anesthesia or intravenous sedation, or while taking prescription Narcotics, limit your activities:  ? Do NOT drive a motor vehicle, operate hazard machinery or power tools, or perform tasks that require coordination. The medication you received during your procedure may have some effect on your mental awareness. ? Do NOT make important personal or business decisions. The medication you received during your procedure may have some effect on your mental awareness. ? Do NOT drink alcoholic beverages. These drinks do not mix well with the medications that have been given to you. ? Upon discharge from the hospital, you must be accompanied by a responsible adult. ? Resume your diet as directed by your physician. ? Resume medications as your physician has prescribed. ? Please give a list of your current medications to your Primary Care Provider. ? Please update this list whenever your medications are discontinued, doses are changed, or new medications (including over-the-counter products) are added. ? Please carry medication information at all times in case of emergency situations. These are general instructions for a healthy lifestyle:    No smoking/ No tobacco products/ Avoid exposure to second hand smoke.  Surgeon General's Warning:  Quitting smoking now greatly reduces serious risk to your health. Obesity, smoking, and a sedentary lifestyle greatly increase your risk for illness.    A healthy diet, regular physical exercise & weight monitoring are important for maintaining a healthy lifestyle   You may be retaining fluid if you have a history of heart failure or if you experience any of the following symptoms:  Weight gain of 3 pounds or more overnight or 5 pounds in a week, increased swelling in our hands or feet or shortness of breath while lying flat in bed. Please call your doctor as soon as you notice any of these symptoms; do not wait until your next office visit. Recognize signs and symptoms of STROKE:  F  -  Face looks uneven  A  -  Arms unable to move or move unevenly  S  -  Speech slurred or non-existent  T  -  Time to call 911 - as soon as signs and symptoms begin - DO NOT go back to bed or wait to see If you get better - TIME IS BRAIN. Colorectal Screening   Colorectal cancer almost always develops from precancerous polyps (abnormal growths) in the colon or rectum. Screening tests can find precancerous polyps, so that they can be removed before they turn into cancer. Screening tests can also find colorectal cancer early, when treatment works best.  [facility] Speak with your physician about when you should begin screening and how often you should be tested. Additional Information    If you have questions, please call [phone]. Remember, [employer] is NOT to be used for urgent needs. For medical emergencies, dial 911. Educational references and/or instructions provided during this visit included:    Colonoscopy      APPOINTMENTS:    Please make a follow-up appointment with your physician. Discharge information has been reviewed with the patient. The patient verbalized understanding.

## 2018-10-12 NOTE — IP AVS SNAPSHOT
[address] [facility] 
 
 
 [address] [address] [address] [address] [phone] Patient: Evonne Proctor MRN: [id number] SYE:[birth year] About your hospitalization You were admitted on:  October 12, 2018 You last received care in the:  [facility] You were discharged on:  October 12, 2018 Why you were hospitalized Your primary diagnosis was:  Not on File Follow-up Information Follow up With Details Comments Contact Info Julius Waters MD   [address] 
[phone] Selma Ponce MD   [address] [facility] [address] 
[phone] Your Scheduled Appointments Friday October 12, 2018 COLONOSCOPY with Julius Waters MD  
[facility] ([facility]) [facility] [address] [address] [address] [phone] Monday October 15, 2018  9:30 AM EDT PROCEDURE with [facility] ([facility]) [address]  
[phone] Wednesday October 24, 2018 11:15 AM EDT Office Visit with Jessica Quintero MD  
[facility] ([facility]) [address] [facility]  
[phone] Wednesday November 14, 2018  1:00 PM EST Follow Up with Latricia Marina MD  
[facility] ([facility]) [address], [address]  
[phone] Discharge Orders None A check candelaria indicates which time of day the medication should be taken. My Medications CONTINUE taking these medications Instructions Each Dose to Equal  
 Morning Noon Evening Bedtime CALCIUM 600 + D 600-125 mg-unit Tab Generic drug:  calcium-cholecalciferol (d3) Your last dose was: Your next dose is: Take  by mouth. CO Q-10 10 mg Cap Generic drug:  coenzyme q10 Your last dose was: Your next dose is: Take  by mouth.  
     
   
   
   
  
 etodolac 400 mg tablet Commonly known as:  LODINE Your last dose was: Your next dose is: TAKE 1 TABLET BY MOUTH 2 TIMES A DAY WITH MEALS  
     
   
   
   
  
 red yeast rice extract 600 mg Cap Your last dose was: Your next dose is: Take 600 mg by mouth now. 600 mg RESTASIS MULTIDOSE 0.05 % Drop ophthalmic drops Generic drug:  cycloSPORINE Your last dose was: Your next dose is:    
   
   
 1 Drop two (2) times a day. 1 Drop Discharge Instructions From Dr. Yfn Boykin: FOLLOW UP VISIT Appointment in: Other (Specify) Repeat colonoscopy in 5 years. Colonoscopy: What to Expect at [facility] Your Recovery After you have a colonoscopy, you will stay at the clinic for 1 to 2 hours until the medicines wear off. Then you can go home. But you will need to arrange for a ride. Your doctor will tell you when you can eat and do your other usual activities. Your doctor will talk to you about when you will need your next colonoscopy. Your doctor can help you decide how often you need to be checked. This will depend on the results of your test and your risk for colorectal cancer. After the test, you may be bloated or have gas pains. You may need to pass gas. If a biopsy was done or a polyp was removed, you may have streaks of blood in your stool (feces) for a few days. This care sheet gives you a general idea about how long it will take for you to recover. But each person recovers at a different pace. Follow the steps below to get better as quickly as possible. How can you care for yourself at home? Activity · Rest when you feel tired. · You can do your normal activities when it feels okay to do so. Diet · Follow your doctor's directions for eating. · Unless your doctor has told you not to, drink plenty of fluids. This helps to replace the fluids that were lost during the colon prep. · Do not drink alcohol. Medicines · If polyps were removed or a biopsy was done during the test, your doctor may tell you not to take aspirin or other anti-inflammatory medicines for a few days. These include ibuprofen (Advil, Motrin) and naproxen (Aleve). Other instructions · For your safety, do not drive or operate machinery until the medicine wears off and you can think clearly. Your doctor may tell you not to drive or operate machinery until the day after your test. 
· Do not sign legal documents or make major decisions until the medicine wears off and you can think clearly. The anesthesia can make it hard for you to fully understand what you are agreeing to. Follow-up care is a key part of your treatment and safety. Be sure to make and go to all appointments, and call your doctor if you are having problems. It's also a good idea to know your test results and keep a list of the medicines you take. When should you call for help? Call 911 anytime you think you may need emergency care. For example, call if: 
· You passed out (lost consciousness). · You pass maroon or bloody stools. · You have severe belly pain. Call your doctor now or seek immediate medical care if: 
· Your stools are black and tarlike. · Your stools have streaks of blood, but you did not have a biopsy or any polyps removed. · You have belly pain, or your belly is swollen and firm. · You vomit. · You have a fever. · You are very dizzy. Watch closely for changes in your health, and be sure to contact your doctor if you have any problems. Where can you learn more? Go to [employer].be Enter E264 in the search box to learn more about \"Colonoscopy: What to Expect at Home. \"  
© 1238-2771 Healthwise, Incorporated.  Care instructions adapted under license by [employer] (which disclaims liability or warranty for this information). This care instruction is for use with your licensed healthcare professional. If you have questions about a medical condition or this instruction, always ask your healthcare professional. [address]  any warranty or liability for your use of this information. Content Version: 41.9.955508; Current as of: November 14, 2014 DISCHARGE SUMMARY from Nurse POST-PROCEDURE INSTRUCTIONS: 
 
Call your Physician if you: 
? Observe any excess bleeding. ? Develop a temperature over 100.5o F. 
? Experience abdominal, shoulder or chest pain. ? Notice any signs of decreased circulation or nerve impairment to an extremity such as a change in color, persistent numbness, tingling, coldness or increase in pain. ? Vomit blood or you have nausea and vomiting lasting longer than 4 hours. ? Are unable to take medications. ? Are unable to urinate within 8 hours after discharge following general anesthesia or intravenous sedation. For the next 24 hours after receiving general anesthesia or intravenous sedation, or while taking prescription Narcotics, limit your activities: 
? Do NOT drive a motor vehicle, operate hazard machinery or power tools, or perform tasks that require coordination. The medication you received during your procedure may have some effect on your mental awareness. ? Do NOT make important personal or business decisions. The medication you received during your procedure may have some effect on your mental awareness. ? Do NOT drink alcoholic beverages. These drinks do not mix well with the medications that have been given to you. ? Upon discharge from the hospital, you must be accompanied by a responsible adult. ? Resume your diet as directed by your physician. ? Resume medications as your physician has prescribed. ?  Please give a list of your current medications to your Primary Care Provider. ? Please update this list whenever your medications are discontinued, doses are changed, or new medications (including over-the-counter products) are added. ? Please carry medication information at all times in case of emergency situations. These are general instructions for a healthy lifestyle: No smoking/ No tobacco products/ Avoid exposure to second hand smoke. ? Surgeon General's Warning:  Quitting smoking now greatly reduces serious risk to your health. Obesity, smoking, and a sedentary lifestyle greatly increase your risk for illness. ? A healthy diet, regular physical exercise & weight monitoring are important for maintaining a healthy lifestyle ? You may be retaining fluid if you have a history of heart failure or if you experience any of the following symptoms:  Weight gain of 3 pounds or more overnight or 5 pounds in a week, increased swelling in our hands or feet or shortness of breath while lying flat in bed. Please call your doctor as soon as you notice any of these symptoms; do not wait until your next office visit. Recognize signs and symptoms of STROKE: 
F  -  Face looks uneven A  -  Arms unable to move or move unevenly S  -  Speech slurred or non-existent T  -  Time to call 911 - as soon as signs and symptoms begin - DO NOT go back to bed or wait to see If you get better - TIME IS BRAIN. Colorectal Screening ? Colorectal cancer almost always develops from precancerous polyps (abnormal growths) in the colon or rectum. Screening tests can find precancerous polyps, so that they can be removed before they turn into cancer. Screening tests can also find colorectal cancer early, when treatment works best. 
? Speak with your physician about when you should begin screening and how often you should be tested. Additional Information If you have questions, please call [phone].  Remember, [address] is NOT to be used for urgent needs. For medical emergencies, dial 911. Educational references and/or instructions provided during this visit included: 
 
Colonoscopy APPOINTMENTS: 
 
Please make a follow-up appointment with your physician. Discharge information has been reviewed with the patient. The patient verbalized understanding. Introducing \[facility] [place] [facility]\"" & HEALTH SERVICES! Dear Kathe Yeager: Thank you for requesting a [employer] account. Our records indicate that you already have an active [employer] account. You can access your account anytime at [email or web address] Did you know that you can access your hospital and ER discharge instructions at any time in [employer]? You can also review all of your test results from your hospital stay or ER visit. Additional Information If you have questions, please visit the Frequently Asked Questions section of the [employer] website at [email or web address] Remember, [employer] is NOT to be used for urgent needs. For medical emergencies, dial 911. Now available from your iPhone and Android! Introducing Mekhi Peng As a [facility] patient, I wanted to make you aware of our electronic visit tool called Mekhi Jaydonmeganfin. [facility] 24/7 allows you to connect within minutes with a medical provider 24 hours a day, seven days a week via a mobile device or tablet or logging into a secure website from your computer. You can access Mekhi Peng from anywhere in the [place]. A virtual visit might be right for you when you have a simple condition and feel like you just dont want to get out of bed, or cant get away from work for an appointment, when your regular [facility] provider is not available (evenings, weekends or holidays), or when youre out of town and need minor care.   Electronic visits cost only $49 and if the Mekhi Ginette provider determines a prescription is needed to treat your condition, one can be electronically transmitted to a nearby pharmacy*. Please take a moment to enroll today if you have not already done so. The enrollment process is free and takes just a few minutes. To enroll, please download the [employer] 24/7 neida to your tablet or phone, or visit [email or web address] to enroll on your computer. And, as an [address] [place] [address] patient with a [employer] account, the results of your visits will be scanned into your electronic medical record and your primary care provider will be able to view the scanned results. We urge you to continue to see your regular [facility]inny [facility]ipes provider for your ongoing medical care. And while your primary care provider may not be the one available when you seek a [employer] virtual visit, the peace of mind you get from getting a real diagnosis real time can be priceless. For more information on [employer], view our Frequently Asked Questions (FAQs) at [email or web address]. Sincerely, 
 
Trinity Flynn MD 
Chief Medical Officer [facility] Enriqueta Horowitz *:  certain medications cannot be prescribed via HRBoss Providers Seen During Your Hospitalization Provider Specialty Primary office phone Lex Al MD Colon and Rectal Surgery [phone] Your Primary Care Physician (PCP) Primary Care Physician Office Phone Office Fax [facility] [phone] You are allergic to the following Allergen Reactions Latex Other (comments) \"YEAST INFECTION\" Wasp (Venom-Wasp) Anaphylaxis Amoxicillin Other (comments) Gave pt bad ha's Flagyl (Metronidazole) Other (comments) Pt states headaches and vomiting Urised (Meth-Hyos-Atrp-M Blue-Ba-Phsal) Itching Swelling Recent Documentation Height Weight Breastfeeding? BMI OB Status Smoking Status 1.651 m 70.3 kg No 25.79 kg/m2 Hysterectomy Never Smoker Emergency Contacts Name Discharge Info Relation Home Work Mobile Pauly El DISCHARGE CAREGIVER [3] Sister [23] [phone] Pauly El  Other Relative [6] [phone] Pauly Kent  Other Relative [6] [phone] Patient Belongings The following personal items are in your possession at time of discharge: 
  Dental Appliances: None  Visual Aid: None Please provide this summary of care documentation to your next provider. Signatures-by signing, you are acknowledging that this After Visit Summary has been reviewed with you and you have received a copy. Patient Signature:  ____________________________________________________________ Date:  ____________________________________________________________  
  
[facility] Provider Signature:  ____________________________________________________________ Date:  ____________________________________________________________

## 2018-10-12 NOTE — PROCEDURES
[facility]  [address] [facility], [address]   [place], [address]  [phone]                    Colonoscopy Procedure Note      Vicki Dumont  1965  [id number]                Date of Procedure: 10/12/2018    Preoperative diagnosis: Z12.11,  Colon cancer Screening, [address] of colorectal cancer    Postoperative diagnosis: Normal    :  Yonas Gomez MD    Assistant(s): Endoscopy Technician-1: Stella Martinez; Constantin Luna  Endoscopy RN-1: Lee Aragon RN    Sedation: MAC    Procedure Details:  Prior to the procedure, a history and physical were performed. The patients medications, allergies and sensitivities were reviewed and all questions were answered. After informed consent was obtained for the procedure, with all risks and benefits of procedure explained. The patient was taken to the endoscopy suite and placed in the left lateral decubitus position. Patient identification and proposed procedure were verified prior to the procedure by the nurse and I. After sequential anesthesia administered by anesthesiologist, a digital rectal exam was performed and was normal.  The Olympus video colonoscope was introduced through the anus and advanced to terminal ileum. The quality of preparation was good. The colonoscope was slowly withdrawn and the mucosa examined for any abnormalities. Cecal withdrawal time was greater than 6 minutes. The patient tolerated the procedure well. There were no complications. Findings/Interventions:   Polyps - none    EBL: none    Recommendations: -Repeat colonoscopy in 5 years. Resume normal medication(s).      Discharge Disposition:  Omer Gil MD  10/12/2018  1:33 PM

## 2018-10-12 NOTE — PERIOP NOTES
Patient left ambulatory with steady gait and son to drive. No complaints or distress noted. Patient's ID bracelet removed and placed in shredder box.

## 2018-10-24 ENCOUNTER — OFFICE VISIT (OUTPATIENT)
Dept: CARDIOLOGY CLINIC | Age: 53
End: 2018-10-24

## 2018-10-24 VITALS
BODY MASS INDEX: 26.33 KG/M2 | HEART RATE: 68 BPM | WEIGHT: 158 LBS | SYSTOLIC BLOOD PRESSURE: 113 MMHG | DIASTOLIC BLOOD PRESSURE: 73 MMHG | HEIGHT: 65 IN

## 2018-10-24 DIAGNOSIS — R00.2 PALPITATIONS: Primary | ICD-10-CM

## 2018-10-24 DIAGNOSIS — R06.02 SOB (SHORTNESS OF BREATH): ICD-10-CM

## 2018-10-24 DIAGNOSIS — R42 DIZZINESS: ICD-10-CM

## 2018-10-24 NOTE — PROGRESS NOTES
HISTORY OF PRESENT ILLNESS Elijah Viramontes is a 52 y.o. female. Palpitations The history is provided by the patient. This is a chronic problem. The problem has not changed since onset. The problem occurs every several days. Associated symptoms include malaise/fatigue. Pertinent negatives include no diaphoresis, no fever, no claudication, no orthopnea, no PND, no nausea, no vomiting, no dizziness, no weakness, no cough, no hemoptysis and no sputum production. Risk factors include no risk factors. Shortness of Breath The history is provided by the patient. This is a chronic problem. The problem occurs intermittently. The problem has not changed since onset. Pertinent negatives include no fever, no ear pain, no neck pain, no cough, no sputum production, no hemoptysis, no wheezing, no PND, no orthopnea, no vomiting, no rash, no leg swelling and no claudication. Associated medical issues do not include CAD or heart failure. Review of Systems Constitutional: Positive for malaise/fatigue. Negative for chills, diaphoresis, fever and weight loss. HENT: Negative for congestion, ear discharge, ear pain, hearing loss, nosebleeds and tinnitus. Eyes: Negative for blurred vision. Respiratory: Negative for cough, hemoptysis, sputum production, wheezing and stridor. Cardiovascular: Positive for palpitations. Negative for orthopnea, claudication, leg swelling and PND. Gastrointestinal: Negative for heartburn, nausea and vomiting. Musculoskeletal: Negative for myalgias and neck pain. Skin: Negative for itching and rash. Neurological: Negative for dizziness, tingling, tremors, focal weakness, loss of consciousness and weakness. Psychiatric/Behavioral: Negative for depression and suicidal ideas. Family History Problem Relation Age of Onset  Cancer Sister  Cancer Father   
     colon  Cancer Maternal Grandmother  Hypertension Mother  Cancer Mother Past Medical History: Diagnosis Date  Colon polyps  Constipation  Heart murmur  Hemorrhoids  Mitral valve prolapse  Nausea & vomiting \"VIOLENTLY SICK AFTER TUBAL LIGATION\"  Plantar fasciitis  Vestibular neuritis Past Surgical History:  
Procedure Laterality Date  HX ANKLE FRACTURE TX    
 HX BREAST LUMPECTOMY    
 right  HX COLONOSCOPY    
 [id number]  HX CYST REMOVAL  2010  
 right breast  
 HX GYN    
 hysterectomy  HX HEENT    
 tonselectomy  HX HEENT    
 lasik eye surgery  HX OTHER SURGICAL    
 cystoscopy  HX OTHER SURGICAL  2010  
 right ovary removed  HX TUBAL LIGATION Social History Tobacco Use  Smoking status: Never Smoker  Smokeless tobacco: Never Used Substance Use Topics  Alcohol use: Yes Comment: OCCASIONALLY Allergies Allergen Reactions  Latex Other (comments) \"YEAST INFECTION\"  Wasp [Venom-Wasp] Anaphylaxis  Amoxicillin Other (comments) Gave pt bad ha's  Flagyl [Metronidazole] Other (comments) Pt states headaches and vomiting  Urised [Meth-Hyos-Atrp-M Blue-Ba-Phsal] Itching and Swelling Outpatient Medications Marked as Taking for the 10/24/18 encounter (Office Visit) with Brennen Tian MD  
Medication Sig Dispense Refill  etodolac (LODINE) 400 mg tablet TAKE 1 TABLET BY MOUTH 2 TIMES A DAY WITH MEALS 60 Tab 0  cycloSPORINE (RESTASIS MULTIDOSE) 0.05 % drop ophthalmic drops 1 Drop two (2) times a day.  coenzyme q10 (CO Q-10) 10 mg cap Take  by mouth.  red yeast rice extract 600 mg cap Take 600 mg by mouth now.  calcium-cholecalciferol, d3, (CALCIUM 600 + D) 600-125 mg-unit tab Take  by mouth. Visit Vitals /73 Pulse 68 Ht 5' 5\" (1.651 m) Wt 71.7 kg (158 lb) BMI 26.29 kg/m² Physical Exam  
Constitutional: She is oriented to person, place, and time. She appears well-developed and well-nourished. No distress.   
HENT:  
 Head: Atraumatic. Mouth/Throat: No oropharyngeal exudate. Eyes: Conjunctivae are normal. Right eye exhibits no discharge. Left eye exhibits no discharge. No scleral icterus. Neck: Normal range of motion. Neck supple. No JVD present. No tracheal deviation present. No thyromegaly present. Cardiovascular: Normal rate and regular rhythm. Exam reveals no gallop. No murmur heard. Pulmonary/Chest: Effort normal and breath sounds normal. No stridor. She has no wheezes. She has no rales. Abdominal: Soft. There is no tenderness. There is no rebound and no guarding. Musculoskeletal: Normal range of motion. She exhibits no edema or tenderness. Lymphadenopathy:  
  She has no cervical adenopathy. Neurological: She is alert and oriented to person, place, and time. She exhibits normal muscle tone. Skin: Skin is warm. She is not diaphoretic. Psychiatric: She has a normal mood and affect. Her behavior is normal.  
 
ekg sinus rhythm with no acute st-t changes 10/15/18 ECHO ADULT COMPLETE 10/19/2018 10/19/2018 Narrative · Normal left ventricular cavity size, wall thickness and systolic  
function (ejection fraction normal). The estimated ejection fraction is 56  
- 60%. No regional wall motion abnormality noted. There is mild (grade 1)  
left ventricular diastolic dysfunction. · Normal cavity size. LA index is 27.76 ml/m2. · Normal right ventricular size and function. · Trace tricuspid valve regurgitation is present. Pulmonary arterial  
systolic pressure is 23 mmHg. There is no evidence of pulmonary  
hypertension. · Mild mitral annular calcification. · No hemodynamically significant valvular pathology. · Pulmonary arterial systolic pressure is 10.7 mmHg. Signed by: Wale Tapia MD  
 
ASSESSMENT and PLAN 
  ICD-10-CM ICD-9-CM 1. Palpitations R00.2 785.1 2. SOB (shortness of breath) R06.02 786.05   
3. Dizziness R42 780.4 No orders of the defined types were placed in this encounter. Follow-up Disposition: 
Return in about 1 year (around 10/24/2019). current treatment plan is effective, no change in therapy. Patient seen for sob and palpitations No syncope No orthostatic hypotension in the clinic today Echo report reviewed with patient. No significant valvular heart disease. Cardiac monitoring reviewed patient had triggered monitor several times however no significant ventricular or atrial arrhythmias were noted. No pauses were noted. Patient was reassured about negative testing. Advised to follow-up in 1 year.

## 2018-10-24 NOTE — PROGRESS NOTES
1. Have you been to the ER, urgent care clinic since your last visit? Hospitalized since your last visit? 
 
 no 
2. Have you seen or consulted any other health care providers outside of the [address] [place] [address] since your last visit? Include any pap smears or colon screening. Yes Where: pcp 3. Since your last visit, have you had any of the following symptoms? shortness of breath.

## 2018-10-24 NOTE — PATIENT INSTRUCTIONS
Shortness of Breath: Care Instructions Your Care Instructions Shortness of breath has many causes. Sometimes conditions such as anxiety can lead to shortness of breath. Some people get mild shortness of breath when they exercise. Trouble breathing also can be a symptom of a serious problem, such as asthma, lung disease, emphysema, heart problems, and pneumonia. If your shortness of breath continues, you may need tests and treatment. Watch for any changes in your breathing and other symptoms. Follow-up care is a key part of your treatment and safety. Be sure to make and go to all appointments, and call your doctor if you are having problems. It's also a good idea to know your test results and keep a list of the medicines you take. How can you care for yourself at home? · Do not smoke or allow others to smoke around you. If you need help quitting, talk to your doctor about stop-smoking programs and medicines. These can increase your chances of quitting for good. · Get plenty of rest and sleep. · Take your medicines exactly as prescribed. Call your doctor if you think you are having a problem with your medicine. · Find healthy ways to deal with stress. ? Exercise daily. ? Get plenty of sleep. ? Eat regularly and well. When should you call for help? Call 911 anytime you think you may need emergency care. For example, call if: 
  · You have severe shortness of breath.  
  · You have symptoms of a heart attack. These may include: 
? Chest pain or pressure, or a strange feeling in the chest. 
? Sweating. ? Shortness of breath. ? Nausea or vomiting. ? Pain, pressure, or a strange feeling in the back, neck, jaw, or upper belly or in one or both shoulders or arms. ? Lightheadedness or sudden weakness. ? A fast or irregular heartbeat. After you call 911, the  may tell you to chew 1 adult-strength or 2 to 4 low-dose aspirin. Wait for an ambulance. Do not try to drive yourself.  Call your doctor now or seek immediate medical care if: 
  · Your shortness of breath gets worse or you start to wheeze. Wheezing is a high-pitched sound when you breathe.  
  · You wake up at night out of breath or have to prop your head up on several pillows to breathe.  
  · You are short of breath after only light activity or while at rest.  
 Watch closely for changes in your health, and be sure to contact your doctor if: 
  · You do not get better over the next 1 to 2 days. Where can you learn more? Go to http://angela-michelle.info/. Enter S780 in the search box to learn more about \"Shortness of Breath: Care Instructions. \" Current as of: December 6, 2017 Content Version: 11.8 © 6428-0048 [employer]. Care instructions adapted under license by [employer] (which disclaims liability or warranty for this information). If you have questions about a medical condition or this instruction, always ask your healthcare professional. [address] any warranty or liability for your use of this information. [employer] Activation Thank you for requesting access to [employer]. Please follow the instructions below to securely access and download your online medical record. [employer] allows you to send messages to your doctor, view your test results, renew your prescriptions, schedule appointments, and more. How Do I Sign Up? 1. In your internet browser, go to [email or web address]. 2. Click on the First Time User? Click Here link in the Sign In box. You will see the New Member Sign Up page. 3. Enter your [employer] Access Code exactly as it appears below. You will not need to use this code after youve completed the sign-up process. If you do not sign up before the expiration date, you must request a new code. [employer] Access Code: Activation code not generated Current [employer]eed Status: Active (This is the date your [employer]eed access code will ) 4. Enter the last four digits of your Social Security Number (xxxx) and Date of Birth (mm/dd/yyyy) as indicated and click Submit. You will be taken to the next sign-up page. 5. Create a Blueseed ID. This will be your Blueseed login ID and cannot be changed, so think of one that is secure and easy to remember. 6. Create a Blueseed password. You can change your password at any time. 7. Enter your Password Reset Question and Answer. This can be used at a later time if you forget your password. 8. Enter your e-mail address. You will receive e-mail notification when new information is available in [address] 9. Click Sign Up. You can now view and download portions of your medical record. 10. Click the Download Summary menu link to download a portable copy of your medical information. Additional Information If you have questions, please visit the Frequently Asked Questions section of the [employer] website at [email or web address] Remember, [employer]eed is NOT to be used for urgent needs. For medical emergencies, dial 911.

## 2018-11-14 ENCOUNTER — OFFICE VISIT (OUTPATIENT)
Dept: ORTHOPEDIC SURGERY | Age: 53
End: 2018-11-14

## 2018-11-14 VITALS
SYSTOLIC BLOOD PRESSURE: 116 MMHG | HEART RATE: 72 BPM | WEIGHT: 159 LBS | BODY MASS INDEX: 26.49 KG/M2 | TEMPERATURE: 97.8 F | DIASTOLIC BLOOD PRESSURE: 78 MMHG | OXYGEN SATURATION: 100 % | HEIGHT: 65 IN | RESPIRATION RATE: 16 BRPM

## 2018-11-14 DIAGNOSIS — M25.571 ACUTE RIGHT ANKLE PAIN: ICD-10-CM

## 2018-11-14 DIAGNOSIS — S82.841S CLOSED BIMALLEOLAR FRACTURE OF RIGHT ANKLE, SEQUELA: Primary | ICD-10-CM

## 2018-11-14 DIAGNOSIS — S82.301D CLOSED FRACTURE OF DISTAL END OF RIGHT FIBULA AND TIBIA WITH ROUTINE HEALING, SUBSEQUENT ENCOUNTER: ICD-10-CM

## 2018-11-14 DIAGNOSIS — S82.831D CLOSED FRACTURE OF DISTAL END OF RIGHT FIBULA AND TIBIA WITH ROUTINE HEALING, SUBSEQUENT ENCOUNTER: ICD-10-CM

## 2018-11-14 RX ORDER — BUSPIRONE HYDROCHLORIDE 15 MG/1
15 TABLET ORAL 2 TIMES DAILY
COMMUNITY

## 2018-11-14 RX ORDER — ETODOLAC 400 MG/1
400 TABLET, FILM COATED ORAL 2 TIMES DAILY WITH MEALS
Qty: 60 TAB | Refills: 0 | Status: SHIPPED | OUTPATIENT
Start: 2018-11-14 | End: 2019-02-11 | Stop reason: SDUPTHER

## 2018-11-14 NOTE — PROGRESS NOTES
AMBULATORY PROGRESS NOTE      Patient: Evonne Proctor             MRN: [id number]     SSN: [id number] Body mass index is 26.46 kg/m². YOB: 1965     AGE: 52 y.o. EX: female    PCP: Kerri Purdy MD     IMPRESSION/DIAGNOSIS AND TREATMENT PLAN     DIAGNOSES  1. Closed bimalleolar fracture of right ankle, sequela    2. Acute right ankle pain    3. Closed fracture of distal end of right fibula and tibia with routine healing, subsequent encounter        Orders Placed This Encounter    [65700] Ankle Min 3V    REFERRAL TO PHYSICAL THERAPY    etodolac (LODINE) 400 mg tablet      Evonne Proctor understands her diagnoses and the proposed plan. Plan:    1) Work Note: Please allow Ms. Zack Ngo to return to work. She is not to stand for longer than 15 minutes in any continuous fashion, and she is not to lift more than 25-30 pounds. 2) Decrease setting on compression stockings. 3) Provide paper copy of the Lodine prescription: Lodine 400 m PO BID; 60 tablets, 0 refills. 4) Continue activity modification as directed. 5) SOFÍA Yeager ATC, has educated the patient on ankle specific exercises and/or stretches. RTO - 3 months     HPI AND EXAMINATION     Meli Kent IS A 52 y.o. female who presents to my outpatient office for follow up of a closed fracture of the distal end of the right fibula and tibia, and a closed bimalleolar fracture of the right ankle. At the last visit, I provided a work note and instructed the patient to continue activity modification as directed. Ms. Fernando Perez, a 77-year-old female, is here for follow-up and assessment. It is to be noted she had ankle, distal tibia and fibula fracture fixation, right side, by Dr. Jenn Bauer of [facility], in 2017.     DIAGNOSTIC STUDIES:  X-rays today, three views of the right ankle, shows healed distal tibia and fibular fractures.   There is some osteopenia seen to the talus and to towards the navicular on these ankle films. The ankle joint still appears to be well maintained. There is some joint space narrowing to the subtalar region. I see no osteolytic or osteoblastic lesions are seen. No fracture, subluxation, or dislocations are seen additional to this complex fracture. Of note, she had this fracture fixed by Dr. Chato Olson of [address] in August 2017. Since we saw her last, Ms. Anshul Rowley states that her right ankle still hurts significantly and swells regularly. She notes that she experiences a burning sensation, as well. However, as she sits here now with her compression stockings removed, she notes that the burning pain has subsided. The patient denies any fever, shakes, or chills. She states that her ankle does not turn red or get warm. She presents to the office wearing her MediPed compression stockings. She reports that she sleeps with a Copper Fit brace at night and notes that this decreases her swelling it the morning. XR imaging has been reviewed with the patient. Of note, the patient is having a bone density scan on November 20th. She states that she will have the results sent over to me. She reports that she is still supplementing with calcium and vitamin D as directed. The patient inquires about whether additional formal PT could help with her pain. As it regards to her hip, Ms. Anshul Rowley states that she has been experiencing some pain in her right hip. She expresses concern about her gait affecting her hips. Visit Vitals  /78   Pulse 72   Temp 97.8 °F (36.6 °C) (Oral)   Resp 16   Ht 5' 5\" (1.651 m)   Wt 159 lb (72.1 kg)   SpO2 100%   BMI 26.46 kg/m²      ANKLE/FOOT right    Psychiatry: Alert, Oriented x 3; Speech normal in context and clarity,            Memory intact grossly, no involuntary movements - tremors, no dementia  Gait: Slight limp  Tenderness: Mild tenderness to the peroneal tendons. ST NT with stress  Cutaneous:  Well healed surgical scars, anteriorly and laterally. Palpable fibula plate laterally  Joint Motion: Decreased inversion of hindfoot    Full eversion    Near-full plantarflexion,     Diminished dorsiflexion, 5 degrees  Joint / Tendon Stability: No Ankle or Subtalar instability or joint laxity. No peroneal sublux ability or dislocation  Alignment: Forefoot, Midfoot, Hindfoot WNL. Neuro Motor/Sensory: NL/NL. Vascular: NL foot/ankle pulses. Lymphatics: No extremity lymphedema, No calf swelling, no tenderness to calf muscles. CHART REVIEW     Past Medical History:   Diagnosis Date    Colon polyps     Constipation     Heart murmur     Hemorrhoids     Mitral valve prolapse     Nausea & vomiting     \"VIOLENTLY SICK AFTER TUBAL LIGATION\"    Plantar fasciitis     Vestibular neuritis      Current Outpatient Medications   Medication Sig    busPIRone (BUSPAR) 15 mg tablet Take 7.5 mg by mouth daily.  etodolac (LODINE) 400 mg tablet Take 400 mg by mouth two (2) times daily (with meals).  etodolac (LODINE) 400 mg tablet TAKE 1 TABLET BY MOUTH 2 TIMES A DAY WITH MEALS    cycloSPORINE (RESTASIS MULTIDOSE) 0.05 % drop ophthalmic drops 1 Drop two (2) times a day.  coenzyme q10 (CO Q-10) 10 mg cap Take  by mouth.  red yeast rice extract 600 mg cap Take 600 mg by mouth now.  calcium-cholecalciferol, d3, (CALCIUM 600 + D) 600-125 mg-unit tab Take  by mouth. No current facility-administered medications for this visit.       Allergies   Allergen Reactions    Latex Other (comments)     \"YEAST INFECTION\"    Wasp [Venom-Wasp] Anaphylaxis    Amoxicillin Other (comments)     Gave pt bad ha's    Flagyl [Metronidazole] Other (comments)     Pt states headaches and vomiting      Urised [Meth-Hyos-Atrp-M Blue-Ba-Phsal] Itching and Swelling     Past Surgical History:   Procedure Laterality Date    HX ANKLE FRACTURE TX      HX BREAST LUMPECTOMY      right    HX COLONOSCOPY      [id number]    HX CYST REMOVAL 2010    right breast    HX GYN      hysterectomy    HX HEENT      tonselectomy    HX HEENT      lasik eye surgery    HX OTHER SURGICAL      cystoscopy    HX OTHER SURGICAL  2010    right ovary removed    HX TUBAL LIGATION       Social History     Occupational History    Not on file   Tobacco Use    Smoking status: Never Smoker    Smokeless tobacco: Never Used   Substance and Sexual Activity    Alcohol use: Yes     Comment: OCCASIONALLY    Drug use: No    Sexual activity: Not on file     Family History   Problem Relation Age of Onset    Cancer Sister     Cancer Father         colon    Cancer Maternal Grandmother     Hypertension Mother     Cancer Mother         REVIEW OF SYSTEMS : 11/14/2018  ALL BELOW ARE Negative except : SEE HPI     Constitutional: Negative for fever, chills and weight loss. Neg Weight Loss  Cardiovascular: Negative for chest pain, claudication and leg swelling. SOB, PORTER   Gastrointestinal/Urological: Negative for  pain, N/V/D/C, Blood in stool or urine,dysuria                         Hematuria, Incontinence, pelvic pain  Musculoskeletal: see HPI. Neurological: Negative for dizziness and weakness, headaches,Visual Changes             Confusion,  Or Seizures,   Psychiatric/Behavioral: Negative for depression, memory loss and substance abuse. Extremities:  Negative for hair changes, rash or skin lesion changes. Hematologic: Negative for Bleeding problems, bruising, pallor or swollen lymph nodes. Peripheral Vascular: No calf pain, vascular vein tenderness to calf pain              No calf throbbing, posterior knee throbbing pain     DIAGNOSTIC IMAGING     No notes on file    Please see above section of this report. I have personally reviewed the results of the above study. The interpretation of this study is my professional opinion. Written by Kevan Alcazar, as dictated by Dr. Isabel Olmos.  I, Dr. Isabel Olmos, confirm that all documentation is accurate.

## 2018-11-14 NOTE — PATIENT INSTRUCTIONS
Please follow up in 3 months. You are advised to contact us if your condition worsens. Foot Pain: Care Instructions Your Care Instructions Foot injuries that cause pain and swelling are fairly common. Almost all sports or home repair projects can cause a misstep that ends up as foot pain. Normal wear and tear, especially as you get older, also can cause foot pain. Most minor foot injuries will heal on their own, and home treatment is usually all you need to do. If you have a severe injury, you may need tests and treatment. Follow-up care is a key part of your treatment and safety. Be sure to make and go to all appointments, and call your doctor if you are having problems. It's also a good idea to know your test results and keep a list of the medicines you take. How can you care for yourself at home? · Take pain medicines exactly as directed. ? If the doctor gave you a prescription medicine for pain, take it as prescribed. ? If you are not taking a prescription pain medicine, ask your doctor if you can take an over-the-counter medicine. · Rest and protect your foot. Take a break from any activity that may cause pain. · Put ice or a cold pack on your foot for 10 to 20 minutes at a time. Put a thin cloth between the ice and your skin. · Prop up the sore foot on a pillow when you ice it or anytime you sit or lie down during the next 3 days. Try to keep it above the level of your heart. This will help reduce swelling. · Your doctor may recommend that you wrap your foot with an elastic bandage. Keep your foot wrapped for as long as your doctor advises. · If your doctor recommends crutches, use them as directed. · Wear roomy footwear. · As soon as pain and swelling end, begin gentle exercises of your foot. Your doctor can tell you which exercises will help. When should you call for help? Call 911 anytime you think you may need emergency care. For example, call if:   · Your foot turns pale, white, blue, or cold.  
 Call your doctor now or seek immediate medical care if: 
  · You cannot move or stand on your foot.  
  · Your foot looks twisted or out of its normal position.  
  · Your foot is not stable when you step down.  
  · You have signs of infection, such as: 
? Increased pain, swelling, warmth, or redness. ? Red streaks leading from the sore area. ? Pus draining from a place on your foot. ? A fever.  
  · Your foot is numb or tingly.  
 Watch closely for changes in your health, and be sure to contact your doctor if: 
  · You do not get better as expected.  
  · You have bruises from an injury that last longer than 2 weeks. Where can you learn more? Go to http://angela-michelle.info/. Enter Q290 in the search box to learn more about \"Foot Pain: Care Instructions. \" Current as of: November 29, 2017 Content Version: 11.8 © 5604-1328 [employer]wise, [employer]. Care instructions adapted under license by [employer] (which disclaims liability or warranty for this information). If you have questions about a medical condition or this instruction, always ask your healthcare professional. [place] [address] any warranty or liability for your use of this information.

## 2019-02-11 ENCOUNTER — OFFICE VISIT (OUTPATIENT)
Dept: ORTHOPEDIC SURGERY | Age: 54
End: 2019-02-11

## 2019-02-11 VITALS
WEIGHT: 156.6 LBS | OXYGEN SATURATION: 100 % | DIASTOLIC BLOOD PRESSURE: 81 MMHG | RESPIRATION RATE: 16 BRPM | HEIGHT: 65 IN | HEART RATE: 76 BPM | TEMPERATURE: 95.7 F | SYSTOLIC BLOOD PRESSURE: 133 MMHG | BODY MASS INDEX: 26.09 KG/M2

## 2019-02-11 DIAGNOSIS — S82.841S CLOSED BIMALLEOLAR FRACTURE OF RIGHT ANKLE, SEQUELA: Primary | ICD-10-CM

## 2019-02-11 DIAGNOSIS — S82.831D CLOSED FRACTURE OF DISTAL END OF RIGHT FIBULA AND TIBIA WITH ROUTINE HEALING, SUBSEQUENT ENCOUNTER: ICD-10-CM

## 2019-02-11 DIAGNOSIS — S82.301D CLOSED FRACTURE OF DISTAL END OF RIGHT FIBULA AND TIBIA WITH ROUTINE HEALING, SUBSEQUENT ENCOUNTER: ICD-10-CM

## 2019-02-11 RX ORDER — ALENDRONATE SODIUM 70 MG/1
TABLET ORAL
COMMUNITY

## 2019-02-11 NOTE — PROGRESS NOTES
AMBULATORY PROGRESS NOTE      Patient: Gemma Pete             MRN: [id number]     SSN: [id number] Body mass index is 26.06 kg/m². YOB: 1965     AGE: 52 y.o. EX: female    PCP: Khloe Maria MD     IMPRESSION/DIAGNOSIS AND TREATMENT PLAN     DIAGNOSES  1. Closed bimalleolar fracture of right ankle, sequela    2. Closed fracture of distal end of right fibula and tibia with routine healing, subsequent encounter        Orders Placed This Encounter    AMB SUPPLY ORDER    [86816] Ankle Min 3V    [address] understands her diagnoses and the proposed plan. Plan:    1) Continue HEP with [employer] as directed. 2) Work Note: Please allow Ms. Jeffrey Finley to work 2 days per week only until further notice. 3) Take Lodine as needed for pain. 4) Continue activity modification as directed. 5) DME Order: Right AS/AC brace or Shock Doctor from [employer]. RTO - 4 weeks     HPI AND EXAMINATION     Gemma Pete IS A 52 y.o. female who presents to my outpatient office for follow up of a closed fracture of the distal end of the right fibula and tibia, and a closed bimalleolar fracture of the right ankle. At the last visit, I provided a work note, provided a referral to PT, prescribed Lodine 400 mg, provided an HEP with ankle specific exercises, and instructed the patient to continue activity modification as directed. Since we saw her last, Ms. Jeffrey Finley states that she is still experiencing pain in her right ankle. She notes that she has returned to work, and she states that she has had some issues with her pain. She finds that the pain seems to come from standing for prolonged periods of time. She reports that she also experiences cramps and swelling, as well. She denies taking diuretics. She finds that she has to dorsiflex her foot and stretch it out in order to relieve the cramping. She reports that she has not been to PT yet.  She states that she is still taking the Lodine, however, she does not believe that it helps too much. Visit Vitals  /81 (BP 1 Location: Left arm, BP Patient Position: Sitting)   Pulse 76   Temp 95.7 °F (35.4 °C) (Oral)   Resp 16   Ht 5' 5\" (1.651 m)   Wt 156 lb 9.6 oz (71 kg)   SpO2 100%   BMI 26.06 kg/m²     Appearance: Alert, well appearing and pleasant patient who is in no distress, oriented to person, place/time, and who follows commands. This patient is accompanied in the examination room by her self. Dementia: no dementia  Psychiatric: Affect and mood are appropriate. Patient arrives to office via: without assistive device  HEENT: Head normocephalic & atraumatic. Both pupils are round, non icteric sclera   Eye: EOM are intact and sclera are clear    Neck: ROM WNL and JVD neck is not present     Hearings Intact, does not require hearing aid device  Respiratory: Breathing is unlabored without accessory chest muscle use  Cardiovascular/Peripheral Vascular: Normal Pulses to each hand and foot    ANKLE/FOOT right     Gait: Slight limp  Tenderness: Mild tenderness to the peroneal tendons. ST NT with stress  Cutaneous: Well healed surgical scars, anteriorly and laterally, with hypertrophy. Palpable fibula plate laterally  Joint Motion: Decreased inversion of hindfoot                          Full eversion                          Near-full plantarflexion,                           Diminished dorsiflexion, just about neutral.  Joint / Tendon Stability: No Ankle or Subtalar instability or joint laxity. No peroneal sublux ability or dislocation  Alignment: Forefoot, Midfoot, Hindfoot WNL. Neuro Motor/Sensory: NL/NL. Vascular: NL foot/ankle pulses. Lymphatics: No extremity lymphedema, No calf swelling, no tenderness to calf muscles.     CHART REVIEW     Past Medical History:   Diagnosis Date    Colon polyps     Constipation  Heart murmur     Hemorrhoids     Mitral valve prolapse     Nausea & vomiting     \"VIOLENTLY SICK AFTER TUBAL LIGATION\"    Plantar fasciitis     Vestibular neuritis      Current Outpatient Medications   Medication Sig    alendronate (FOSAMAX) 70 mg tablet Take  by mouth.  busPIRone (BUSPAR) 15 mg tablet Take 7.5 mg by mouth daily.  etodolac (LODINE) 400 mg tablet TAKE 1 TABLET BY MOUTH 2 TIMES A DAY WITH MEALS    coenzyme q10 (CO Q-10) 10 mg cap Take  by mouth.  red yeast rice extract 600 mg cap Take 600 mg by mouth now.  calcium-cholecalciferol, d3, (CALCIUM 600 + D) 600-125 mg-unit tab Take  by mouth.  cycloSPORINE (RESTASIS MULTIDOSE) 0.05 % drop ophthalmic drops 1 Drop two (2) times a day. No current facility-administered medications for this visit.       Allergies   Allergen Reactions    Latex Other (comments)     \"YEAST INFECTION\"    Wasp [Venom-Wasp] Anaphylaxis    Amoxicillin Other (comments)     Gave pt bad ha's    Flagyl [Metronidazole] Other (comments)     Pt states headaches and vomiting      Urised [Meth-Hyos-Atrp-M Blue-Ba-Phsal] Itching and Swelling     Past Surgical History:   Procedure Laterality Date    COLONOSCOPY N/A 10/12/2018    COLONOSCOPY performed by Jennie Oneill MD at [facility] HX 1 [facility] HX BREAST LUMPECTOMY      right    HX COLONOSCOPY      2010,2013    HX CYST REMOVAL  2010    right breast    HX GYN      hysterectomy    HX HEENT      tonselectomy    HX HEENT      lasik eye surgery    HX OTHER SURGICAL      cystoscopy    HX OTHER SURGICAL  2010    right ovary removed    HX TUBAL LIGATION       Social History     Occupational History    Not on file   Tobacco Use    Smoking status: Never Smoker    Smokeless tobacco: Never Used   Substance and Sexual Activity    Alcohol use: Yes     Comment: OCCASIONALLY    Drug use: No    Sexual activity: Not on file     Family History   Problem Relation Age of Onset    Cancer Sister     Cancer Father         colon    Cancer Maternal Grandmother     Hypertension Mother     Cancer Mother         REVIEW OF SYSTEMS : 2/11/2019  ALL BELOW ARE Negative except : SEE HPI     Constitutional: Negative for fever, chills and weight loss. Neg Weight Loss  Cardiovascular: Negative for chest pain, claudication and leg swelling. SOB, PORTER   Gastrointestinal/Urological: Negative for  pain, N/V/D/C, Blood in stool or urine,dysuria                         Hematuria, Incontinence, pelvic pain  Musculoskeletal: see HPI. Neurological: Negative for dizziness and weakness, headaches,Visual Changes             Confusion,  Or Seizures,   Psychiatric/Behavioral: Negative for depression, memory loss and substance abuse. Extremities:  Negative for hair changes, rash or skin lesion changes. Hematologic: Negative for Bleeding problems, bruising, pallor or swollen lymph nodes. Peripheral Vascular: No calf pain, vascular vein tenderness to calf pain              No calf throbbing, posterior knee throbbing pain     DIAGNOSTIC IMAGING     No notes on file X-rays, three views of the right ankle, AP, lateral, and oblique, today at [facility], February 11, 2019: There is retained hardware to the right distal tibia and fibula. There are healed distal tibia and fibula fractures. There is some generalized osteopenia. The ankle is concentrically aligned. There is some mild OA in the lateral gutter of the ankle. Otherwise, I see no bone-on-bone OA of the ankle. I have personally reviewed the results of the above study. The interpretation of this study is my professional opinion. Written by Mable Albarran, as dictated by Dr. Didi An. I, Dr. Didi An, confirm that all documentation is accurate.

## 2019-02-11 NOTE — LETTER
NOTIFICATION RETURN TO WORK / SCHOOL 
 
2/11/2019 2:00 PM 
 
Ms. Janak Stevens 
[facility] 
[address] [place] [id number] To Whom It May Concern: 
 
Janak Stevens is currently under the care of [address] [place] [address] Mayito Turner. Please allow Ms. Sumaya Rubin to work 2 days per week only until further notice. If there are questions or concerns please have the patient contact our office.  
 
 
 
Sincerely, 
 
 
Mohsen Strange MD

## 2019-02-11 NOTE — PATIENT INSTRUCTIONS
Please follow up in 4 weeks. You are advised to contact us if your condition worsens. Look into Shock Doctor from [employer] Address: [place] [address] [place], [place], [address] Phone: [phone] Foot Pain: Care Instructions Your Care Instructions Foot injuries that cause pain and swelling are fairly common. Almost all sports or home repair projects can cause a misstep that ends up as foot pain. Normal wear and tear, especially as you get older, also can cause foot pain. Most minor foot injuries will heal on their own, and home treatment is usually all you need to do. If you have a severe injury, you may need tests and treatment. Follow-up care is a key part of your treatment and safety. Be sure to make and go to all appointments, and call your doctor if you are having problems. It's also a good idea to know your test results and keep a list of the medicines you take. How can you care for yourself at home? · Take pain medicines exactly as directed. ? If the doctor gave you a prescription medicine for pain, take it as prescribed. ? If you are not taking a prescription pain medicine, ask your doctor if you can take an over-the-counter medicine. · Rest and protect your foot. Take a break from any activity that may cause pain. · Put ice or a cold pack on your foot for 10 to 20 minutes at a time. Put a thin cloth between the ice and your skin. · Prop up the sore foot on a pillow when you ice it or anytime you sit or lie down during the next 3 days. Try to keep it above the level of your heart. This will help reduce swelling. · Your doctor may recommend that you wrap your foot with an elastic bandage. Keep your foot wrapped for as long as your doctor advises. · If your doctor recommends crutches, use them as directed. · Wear roomy footwear. · As soon as pain and swelling end, begin gentle exercises of your foot. Your doctor can tell you which exercises will help. When should you call for help? Call 911 anytime you think you may need emergency care. For example, call if: 
  · Your foot turns pale, white, blue, or cold.  
 Call your doctor now or seek immediate medical care if: 
  · You cannot move or stand on your foot.  
  · Your foot looks twisted or out of its normal position.  
  · Your foot is not stable when you step down.  
  · You have signs of infection, such as: 
? Increased pain, swelling, warmth, or redness. ? Red streaks leading from the sore area. ? Pus draining from a place on your foot. ? A fever.  
  · Your foot is numb or tingly.  
 Watch closely for changes in your health, and be sure to contact your doctor if: 
  · You do not get better as expected.  
  · You have bruises from an injury that last longer than 2 weeks. Where can you learn more? Go to http://angela-michelle.info/. Enter H056 in the search box to learn more about \"Foot Pain: Care Instructions. \" Current as of: September 20, 2018 Content Version: 11.9 © 2717-0477 [employer]. Care instructions adapted under license by [employer] (which disclaims liability or warranty for this information). If you have questions about a medical condition or this instruction, always ask your healthcare professional. [address] any warranty or liability for your use of this information.

## 2019-02-11 NOTE — PROGRESS NOTES
1. Have you been to the ER, urgent care clinic since your last visit? Hospitalized since your last visit? Yes-[facility]    2. Have you seen or consulted any other health care providers outside of the [address] [place] [address] since your last visit? Include any pap smears or colon screening.  Unsure

## 2019-02-15 ENCOUNTER — HOSPITAL ENCOUNTER (OUTPATIENT)
Dept: PHYSICAL THERAPY | Age: 54
Discharge: HOME | End: 2019-02-15
Payer: MEDICAID

## 2019-02-15 PROCEDURE — 97016 VASOPNEUMATIC DEVICE THERAPY: CPT

## 2019-02-15 PROCEDURE — 97110 THERAPEUTIC EXERCISES: CPT

## 2019-02-15 PROCEDURE — 97162 PT EVAL MOD COMPLEX 30 MIN: CPT

## 2019-02-15 NOTE — PROGRESS NOTES
[facility]  [address] [facility] [address]  [place], [address]  [phone] fax    Plan of Care/ Statement of Necessity for Physical Therapy Services    Patient name: Art Marc Start of Care: 2/15/2019   Referral source: Benjamin Cunningham MD : 1965    Medical Diagnosis: Right ankle pain [M25.571]  Payor: [place] / Plan: 1500 / Product Type: Medicaid /  Onset Date:surgery 2017    Treatment Diagnosis: right distal tib/fib fx (2017) - chronic pain   Prior Hospitalization: see medical history Provider#: [id number]   Medications: Verified on Patient summary List    Comorbidities: anxiety, latex allergy, right ankle surgery   Prior Level of Function: functionally I with all activities      The Plan of Care and following information is based on the information from the initial evaluation. Assessment/ key information: 51 y/o female presents to PT with c/o increasing right ankle pain over the past 2 weeks after she returned to work. She reports she had returned to work 3 days a week and has had pain increase. She report Dr. Mario Bowie has now reduced her to 2 times per week. She has c/o \"cramps and spasms\" in the right gastroc and anterior tibialis and pain throughout the ankle. Pt reports her pain is not too bad with sitting but increases with standing or walking. Pt is ambulating with right LE rotated in ER and diminished toe off with use of air cast brace. She demonstrates minimal edema at this time but reports she does get increased swelling. + tenderness in the right gastroc. Right ankle AROM as follows: DF with knee extended: lacks 8 degrees from neutral, DF with knee flexion: 5 degrees, PF 35 degrees, IV 12 degrees, EV 10 degrees. MMT right ankle DF 5/5, PF 4/5, IV and EV 4-/5. Pt will benefit from PT to address the aforementioned impairments.      Evaluation Complexity History MEDIUM  Complexity : 1-2 comorbidities / personal factors will impact the outcome/ POC ; Examination MEDIUM Complexity : 3 Standardized tests and measures addressing body structure, function, activity limitation and / or participation in recreation  ;Presentation MEDIUM Complexity : Evolving with changing characteristics  ; Clinical Decision Making MEDIUM Complexity : FOTO score of 26-74  Overall Complexity Rating: MEDIUM  Problem List: pain affecting function, decrease ROM, decrease strength, impaired gait/ balance, decrease ADL/ functional abilitiies, decrease activity tolerance and decrease flexibility/ joint mobility   Treatment Plan may include any combination of the following: Therapeutic exercise, Therapeutic activities, Neuromuscular re-education, Physical agent/modality, Gait/balance training, Manual therapy, Patient education and Self Care training  Patient / Family readiness to learn indicated by: asking questions and trying to perform skills  Persons(s) to be included in education: patient (P)  Barriers to Learning/Limitations: None  Patient Goal (s): To feel better  Patient Self Reported Health Status: good  Rehabilitation Potential: fair    Short Term Goals: To be accomplished in 1 weeks:   1. Pt will be I and compliant with HEP   Long Term Goals: To be accomplished in 4 weeks:   1. Improve FOTO score to 50 to improve ability for functional tasks   2. Improve right ankle DF with knee extended to neutral for improved gait. 3. Improve right ankle MMT EV and IV to at least 4+/5 to improve ability for functional tasks   4. Pt will report no difficulty with walking between rooms at home. 5. Pt will report no difficulty with getting shoes or socks on.    6. Pt will report no difficulty with lifting a bag of groceries from the floor due to right ankle pain. Frequency / Duration: Patient to be seen 2-3 times per week for 4 weeks.     Patient/ Caregiver education and instruction: Diagnosis, prognosis, self care, brace/ splint application and exercises   [x]  Plan of care has been reviewed with PRABHJOT Sutton, PT 2/15/2019 8:33 AM    ________________________________________________________________________    I certify that the above Therapy Services are being furnished while the patient is under my care. I agree with the treatment plan and certify that this therapy is necessary.     Physician's Signature:____________Date:_________TIME:________    ** Signature, Date and Time must be completed for valid certification **    Please sign and return to [facility] [address] [place] [address] & [facility]  [address] [place] [address], [facility] [address]  [phone] fax

## 2019-02-15 NOTE — PROGRESS NOTES
PT DAILY TREATMENT NOTE 10-18    Patient Name: Maribel Pelayo  Date:2/15/2019  : 1965  [x]  Patient  Verified  Payor: [place] / Plan: 1500 / Product Type: Medicaid /    In time:830  Out time:917  Total Treatment Time (min): 47  Visit #: 1 of 8    Treatment Area: Right ankle pain [M25.571]    SUBJECTIVE  Pain Level (0-10 scale): 7  Any medication changes, allergies to medications, adverse drug reactions, diagnosis change, or new procedure performed?: [x] No    [] Yes (see summary sheet for update)  Subjective functional status/changes:   [] No changes reported       OBJECTIVE    Modality rationale: decrease edema, decrease inflammation and decrease pain to improve the patients ability to perform functional tasks   Min Type Additional Details    [] Estim:  []Unatt       []IFC  []Premod                        []Other:  []w/ice   []w/heat  Position:  Location:    [] Estim: []Att    []TENS instruct  []NMES                    []Other:  []w/US   []w/ice   []w/heat  Position:  Location:    []  Traction: [] Cervical       []Lumbar                       [] Prone          []Supine                       []Intermittent   []Continuous Lbs:  [] before manual  [] after manual    []  Ultrasound: []Continuous   [] Pulsed                           []1MHz   []3MHz W/cm2:  Location:    []  Iontophoresis with dexamethasone         Location: [] Take home patch   [] In clinic    []  Ice     []  heat  []  Ice massage  []  Laser   []  Anodyne Position:  Location:    []  Laser with stim  []  Other:  Position:  Location:   10 [x]  Vasopneumatic Device Pressure:       [x] lo [] med [] hi   Temperature: [x] lo [] med [] hi   [] Skin assessment post-treatment:  []intact []redness- no adverse reaction    []redness - adverse reaction:     27 min [x]Eval                  []Re-Eval       10 min Therapeutic Exercise:  [] See flow sheet : HEP   Rationale: increase ROM and increase strength to improve the patients ability to perform functional tasks            With   [] TE   [] TA   [] neuro   [] other: Patient Education: [x] Review HEP    [] Progressed/Changed HEP based on:   [] positioning   [] body mechanics   [] transfers   [] heat/ice application    [] other:      Other Objective/Functional Measures:       Pain Level (0-10 scale) post treatment: 7    ASSESSMENT/Changes in Function:      Patient will continue to benefit from skilled PT services to modify and progress therapeutic interventions, address functional mobility deficits, address ROM deficits, address strength deficits, analyze and address soft tissue restrictions, analyze and cue movement patterns and analyze and modify body mechanics/ergonomics to attain remaining goals.      [x]  See Plan of Care  []  See progress note/recertification  []  See Discharge Summary         Progress towards goals / Updated goals:  Per POC    PLAN  []  Upgrade activities as tolerated     [x]  Continue plan of care  []  Update interventions per flow sheet       []  Discharge due to:_  []  Other:_      Raffaele Cesar, PT 2/15/2019  8:31 AM    Future Appointments   Date Time Provider [facility]   2/25/2019 10:50 AM MD Jason Cat [address]   10/9/2019  9:30 AM Karyn Galvan MD [id number] [place] [address]

## 2019-02-27 ENCOUNTER — HOSPITAL ENCOUNTER (OUTPATIENT)
Dept: PHYSICAL THERAPY | Age: 54
Discharge: HOME | End: 2019-02-27
Payer: MEDICAID

## 2019-02-27 PROCEDURE — 97112 NEUROMUSCULAR REEDUCATION: CPT

## 2019-02-27 PROCEDURE — 97110 THERAPEUTIC EXERCISES: CPT

## 2019-02-27 PROCEDURE — 97140 MANUAL THERAPY 1/> REGIONS: CPT

## 2019-02-27 NOTE — PROGRESS NOTES
PT DAILY TREATMENT NOTE 10-18    Patient Name: Logan Gave  Date:2019  : 1965  [x]  Patient  Verified  Payor: MEDICAID [place] / Plan: 1500 / Product Type: Medicaid /    In OUCK:[id number]  Out YUUV:[id number]  Total Treatment Time (min): 58  Visit #: 2 of 10    Treatment Area: No admission diagnoses are documented for this encounter.     SUBJECTIVE  Pain Level (0-10 scale): 6  Any medication changes, allergies to medications, adverse drug reactions, diagnosis change, or new procedure performed?: [x] No    [] Yes (see summary sheet for update)  Subjective functional status/changes:   [] No changes reported  Continued     OBJECTIVE        16 min Therapeutic Exercise:  [x] See flow sheet :   Rationale: increase ROM, increase strength and improve coordination to improve the patients ability to tolerate increased activity levels    28 min Neuromuscular Re-education:  [x]  See flow sheet :   Rationale: increase ROM, increase strength and improve coordination  to improve the patients ability to improve daily function    14 min Manual Therapy:  Inf glide right sacral base, (B) Innominate p/a mobs, (B) LE LAD, (B) L/S and T/S Rot mobs     Rationale: decrease pain, increase ROM and increase tissue extensibility to ambulate with a good gait pattern       With   [x] TE   [] TA   [] neuro   [] other: Patient Education: [x] Review HEP    [] Progressed/Changed HEP based on:   [] positioning   [] body mechanics   [] transfers   [] heat/ice application    [] other:      Other Objective/Functional Measures:   - (+) right slump test,  - (+) right piriformis test  - Gait: Decr left pelvic sway with ambulation, Right antalgic gait  - Decr mobility left innominate in stork stance  - Elev Right crest  - Right Innominate ant rot       Pain Level (0-10 scale) post treatment: 6    ASSESSMENT/Changes in Function:   - Improved pelvic alignment and mobility after manual therapy  - Good tolerance with first full treatment    Patient will continue to benefit from skilled PT services to modify and progress therapeutic interventions, address functional mobility deficits, address ROM deficits, address strength deficits, analyze and address soft tissue restrictions, analyze and cue movement patterns and analyze and modify body mechanics/ergonomics to attain remaining goals. [x]  See Plan of Care  []  See progress note/recertification  []  See Discharge Summary         Progress towards goals / Updated goals:  Short Term Goals: To be accomplished in 1 weeks:               1. Pt will be I and compliant with HEP    Current: Reports compliance and good tolerance with added exercises    Long Term Goals: To be accomplished in 4 weeks:               1. Improve FOTO score to 50 to improve ability for functional tasks               2. Improve right ankle DF with knee extended to neutral for improved gait. 3. Improve right ankle MMT EV and IV to at least 4+/5 to improve ability for functional tasks               4. Pt will report no difficulty with walking between rooms at home. 5. Pt will report no difficulty with getting shoes or socks on.                6. Pt will report no difficulty with lifting a bag of groceries from the floor due to right ankle pain.          PLAN  [x]  Upgrade activities as tolerated     [x]  Continue plan of care  []  Update interventions per flow sheet       []  Discharge due to:_  []  Other:_      Kulwinder Jo, PT 2/27/2019  8:53 AM    Future Appointments   Date Time Provider Monique Bullard   3/11/2019  1:10 PM Klaudia Simpson MD [address]   10/9/2019  9:30 AM Makeda Matos MD [id number] [facility]

## 2019-03-11 ENCOUNTER — OFFICE VISIT (OUTPATIENT)
Dept: ORTHOPEDIC SURGERY | Age: 54
End: 2019-03-11

## 2019-03-11 VITALS
BODY MASS INDEX: 25.99 KG/M2 | SYSTOLIC BLOOD PRESSURE: 102 MMHG | HEIGHT: 65 IN | DIASTOLIC BLOOD PRESSURE: 65 MMHG | WEIGHT: 156 LBS | OXYGEN SATURATION: 92 % | RESPIRATION RATE: 12 BRPM | TEMPERATURE: 96.5 F | HEART RATE: 84 BPM

## 2019-03-11 DIAGNOSIS — M85.80 OSTEOPENIA, UNSPECIFIED LOCATION: ICD-10-CM

## 2019-03-11 DIAGNOSIS — S82.831D CLOSED FRACTURE OF DISTAL END OF RIGHT FIBULA AND TIBIA WITH ROUTINE HEALING, SUBSEQUENT ENCOUNTER: ICD-10-CM

## 2019-03-11 DIAGNOSIS — S82.841S CLOSED BIMALLEOLAR FRACTURE OF RIGHT ANKLE, SEQUELA: Primary | ICD-10-CM

## 2019-03-11 DIAGNOSIS — S82.301D CLOSED FRACTURE OF DISTAL END OF RIGHT FIBULA AND TIBIA WITH ROUTINE HEALING, SUBSEQUENT ENCOUNTER: ICD-10-CM

## 2019-03-11 NOTE — PROGRESS NOTES
AMBULATORY PROGRESS NOTE      Patient: Stephanie Adams             MRN: [id number]     SSN: [id number] Body mass index is 25.96 kg/m². YOB: 1965     AGE: 52 y.o. EX: female    PCP: Luis Enrique Fields MD     IMPRESSION/DIAGNOSIS AND TREATMENT PLAN     DIAGNOSES  1. Closed bimalleolar fracture of right ankle, sequela    2. Closed fracture of distal end of right fibula and tibia with routine healing, subsequent encounter    3. Osteopenia, unspecified location        Orders Placed This Encounter    REFERRAL TO ENDOCRINOLOGY      Stephanie Adams understands her diagnoses and the proposed plan. Plan:    1) Continue PT as tolerated. 2) Referral to Endocrinology. Please make suggestions for alternatives to Fosamax. 3) Look into a Shock Doctor ankle brace at [employer]. 4) Continue activity modification as directed. RTO - 5-6 weeks     HPI AND EXAMINATION     Stephanie Adams IS A 52 y.o. female who presents to my outpatient office for follow up of a closed fracture of the distal end of the right fibula and tibia, and a closed bimalleolar fracture of the right ankle. At the last visit, I provided a work note, provided an order for a right AS/AC brace or Shock Doctor brace, instructed the patient to continue activity modification as directed, to continue the HEP, and to take Lodine as needed for pain. Since we saw her last, Ms. Klaus Wilocx reports that she is still experiencing pain in her right ankle. She reports that she tried to perform her exercises at her first PT session, however, this worsened her pain. She states that she is covered by her insurance for 5 sessions of PT, and she will try to complete the sessions. She has found that she has been limping less since her PT session. She states that she had to quit her job due to her level of pain. She presents to the office today with her ankle brace, which she has been wearing as directed.  She finds that this helps with her pain, however, she cannot wear it too much, as it will begin to cause pain. Of note, the patient recently placed back on Fosamax for osteopenia, as ordered by her PCP. She states that her last bone density scan was in November 2018 and that she has a scan yearly. She notes that she does not like taking Fosamax and would like a suggestion for an alternative to this medication. Visit Vitals  /65   Pulse 84   Temp 96.5 °F (35.8 °C) (Oral)   Resp 12   Ht 5' 5\" (1.651 m)   Wt 156 lb (70.8 kg)   SpO2 92%   BMI 25.96 kg/m²      Appearance: Alert, well appearing and pleasant patient who is in no distress, oriented to person, place/time, and who follows commands. This patient is accompanied in the examination room by her self. Dementia: no dementia  Psychiatric: Affect and mood are appropriate. Patient arrives to office via: without assistive device  HEENT: Head normocephalic & atraumatic. Both pupils are round, non icteric sclera              Eye: EOM are intact and sclera are clear               Neck: ROM WNL and JVD neck is not present              Hearings Intact, does not require hearing aid device  Respiratory: Breathing is unlabored without accessory chest muscle use  Cardiovascular/Peripheral Vascular: Normal Pulses to each hand and foot     ANKLE/FOOT right     Gait: Slight limp  Tenderness: Mild tenderness to the peroneal tendons.                         HX NT with stress  Cutaneous: Well healed surgical scars, anteriorly and laterally, from subtalar fusion, with hypertrophy.                           Palpable fibula plate laterally  Joint Motion: Decreased inversion of hindfoot                          Full eversion                          Near-full plantarflexion,                           Diminished dorsiflexion, just about neutral.    PF arch better than DF arch. Joint / Tendon Stability: No Ankle or Subtalar instability or joint laxity.                                              No peroneal sublux ability or dislocation  Alignment: Forefoot, Midfoot, Hindfoot WNL. Neuro Motor/Sensory: NL/NL. Vascular: NL foot/ankle pulses. Lymphatics: No extremity lymphedema, No calf swelling, no tenderness to calf muscles. CHART REVIEW     Past Medical History:   Diagnosis Date    Colon polyps     Constipation     Heart murmur     Hemorrhoids     Mitral valve prolapse     Nausea & vomiting     \"VIOLENTLY SICK AFTER TUBAL LIGATION\"    Plantar fasciitis     Vestibular neuritis      Current Outpatient Medications   Medication Sig    alendronate (FOSAMAX) 70 mg tablet Take  by mouth.  busPIRone (BUSPAR) 15 mg tablet Take 7.5 mg by mouth daily.  etodolac (LODINE) 400 mg tablet TAKE 1 TABLET BY MOUTH 2 TIMES A DAY WITH MEALS    cycloSPORINE (RESTASIS MULTIDOSE) 0.05 % drop ophthalmic drops 1 Drop two (2) times a day.  coenzyme q10 (CO Q-10) 10 mg cap Take  by mouth.  red yeast rice extract 600 mg cap Take 600 mg by mouth now.  calcium-cholecalciferol, d3, (CALCIUM 600 + D) 600-125 mg-unit tab Take  by mouth. No current facility-administered medications for this visit.       Allergies   Allergen Reactions    Latex Other (comments)     \"YEAST INFECTION\"    Wasp [Venom-Wasp] Anaphylaxis    Amoxicillin Other (comments)     Gave pt bad ha's    Flagyl [Metronidazole] Other (comments)     Pt states headaches and vomiting      Urised [Meth-Hyos-Atrp-M Blue-Ba-Phsal] Itching and Swelling     Past Surgical History:   Procedure Laterality Date    COLONOSCOPY N/A 10/12/2018    COLONOSCOPY performed by Maricel Munoz MD at [place] [id number] [facility] HX BREAST LUMPECTOMY      right    HX COLONOSCOPY      2010,2013    HX CYST REMOVAL  2010    right breast    HX GYN      hysterectomy    HX HEENT      tonselectomy    HX HEENT      lasik eye surgery    HX OTHER SURGICAL      cystoscopy    HX OTHER SURGICAL  2010    right ovary removed    HX TUBAL LIGATION       Social History     Occupational History    Not on file   Tobacco Use    Smoking status: Never Smoker    Smokeless tobacco: Never Used   Substance and Sexual Activity    Alcohol use: Yes     Comment: OCCASIONALLY    Drug use: No    Sexual activity: Not on file     Family History   Problem Relation Age of Onset    Cancer Sister     Cancer Father         colon    Cancer Maternal Grandmother     Hypertension Mother     Cancer Mother         REVIEW OF SYSTEMS : 3/11/2019  ALL BELOW ARE Negative except : SEE HPI     Constitutional: Negative for fever, chills and weight loss. Neg Weight Loss  Cardiovascular: Negative for chest pain, claudication and leg swelling. SOB, PORTER   Gastrointestinal/Urological: Negative for  pain, N/V/D/C, Blood in stool or urine,dysuria                         Hematuria, Incontinence, pelvic pain  Musculoskeletal: see HPI. Neurological: Negative for dizziness and weakness, headaches,Visual Changes                Confusion,  Or Seizures,   Psychiatric/Behavioral: Negative for depression, memory loss and substance abuse. Extremities:  Negative for hair changes, rash or skin lesion changes. Hematologic: Negative for Bleeding problems, bruising, pallor or swollen lymph nodes. Peripheral Vascular: No calf pain, vascular vein tenderness to calf pain              No calf throbbing, posterior knee throbbing pain     DIAGNOSTIC IMAGING     No notes on file    Please see above section of this report. I have personally reviewed the results of the above study. The interpretation of this study is my professional opinion. Written by Breanna Goncalves, as dictated by Dr. Znia Knox. I, Dr. Zina Knox, confirm that all documentation is accurate.

## 2019-03-11 NOTE — PROGRESS NOTES
Verbal order given by Dr. Atiya Hart to sign order for referral entered by [facility].

## 2019-03-13 ENCOUNTER — HOSPITAL ENCOUNTER (OUTPATIENT)
Dept: PHYSICAL THERAPY | Age: 54
Discharge: HOME | End: 2019-03-13
Payer: MEDICAID

## 2019-03-13 PROCEDURE — 97110 THERAPEUTIC EXERCISES: CPT

## 2019-03-13 PROCEDURE — 97035 APP MDLTY 1+ULTRASOUND EA 15: CPT

## 2019-03-13 NOTE — PROGRESS NOTES
PT DAILY TREATMENT NOTE 10-18    Patient Name: Alvin Irizarry  Date:3/13/2019  : 1965  [x]  Patient  Verified  Payor: [facility] MEDICAID / Plan: Mariana Sunshine / Product Type: Managed Care Medicaid /    In time:940  Out time:105  Total Treatment Time (min): 36  Visit #: 3 of 8    Medicare/BCBS Only   Total Timed Codes (min):    1:1 Treatment Time:          Treatment Area: Back pain [M54.9]    SUBJECTIVE  Pain Level (0-10 scale): 7  Any medication changes, allergies to medications, adverse drug reactions, diagnosis change, or new procedure performed?: [x] No    [] Yes (see summary sheet for update)  Subjective functional status/changes:   [] No changes reported  Standing a lot with work at the [employer].     OBJECTIVE    Modality rationale: decrease inflammation, decrease pain and increase tissue extensibility to improve the patients ability to tolerate increased activity   Min Type Additional Details    [] Estim:  []Unatt       []IFC  []Premod                        []Other:  []w/ice   []w/heat  Position:  Location:    [] Estim: []Att    []TENS instruct  []NMES                    []Other:  []w/US   []w/ice   []w/heat  Position:  Location:    []  Traction: [] Cervical       []Lumbar                       [] Prone          []Supine                       []Intermittent   []Continuous Lbs:  [] before manual  [] after manual   11 [x]  Ultrasound: [x]Continuous   [] Pulsed                           []1MHz   [x]3MHz W/cm2:1.3 and 1.5  Location: lateral right ankle and peroneals, Gastroc and achilles    []  Iontophoresis with dexamethasone         Location: [] Take home patch   [] In clinic    []  Ice     []  heat  []  Ice massage  []  Laser   []  Anodyne Position:  Location:    []  Laser with stim  []  Other:  Position:  Location:    []  Vasopneumatic Device Pressure:       [] lo [] med [] hi   Temperature: [] lo [] med [] hi   [x] Skin assessment post-treatment:  [x]intact []redness- no adverse reaction    []redness - adverse reaction:     25 min Therapeutic Exercise:  [x] See flow sheet : Pt education and discussion about condition   Rationale: increase ROM, increase strength and improve coordination to improve the patients ability to tolerate increased activity  4 min Neuromuscular Re-education:  []  See flow sheet :  KT I-Strip for spacing at the sub lateral malleolus     Rationale: increase ROM and increase strength  to improve the patients ability to return to normal           With   [x] TE   [] TA   [] neuro   [] other: Patient Education: [x] Review HEP    [] Progressed/Changed HEP based on:   [] positioning   [] body mechanics   [] transfers   [] heat/ice application    [] other:      Other Objective/Functional Measures:   - Minor Right antalgic gait  - Continued s/s of right Sciatic nerve compression  - - TTP right peroneals/gastrock/achilles tendon/soleous      Pain Level (0-10 scale) post treatment: 6    ASSESSMENT/Changes in Function:   - Improved gait with less antalgia  - Good response to US with decreased TTP surrounding right ankle and posterior knee  - Decreased discomfort noted with ambulation after treatment    Patient will continue to benefit from skilled PT services to modify and progress therapeutic interventions, address functional mobility deficits, address ROM deficits, address strength deficits, analyze and address soft tissue restrictions and analyze and cue movement patterns to attain remaining goals. []  See Plan of Care  []  See progress note/recertification  []  See Discharge Summary         Progress towards goals / Updated goals:  Short Term Goals: To be accomplished in 1 weeks:               1. Pt will be I and compliant with HEP                            Current: Reports compliance and good tolerance with added exercises 2/27/19    Long Term Goals: To be accomplished in 4 weeks:               1.  Improve FOTO score to 50 to improve ability for functional tasks               2. Improve right ankle DF with knee extended to neutral for improved gait. Current: Met with DF greater than neutral in supine HS 90/90 3/13/19               3. Improve right ankle MMT EV and IV to at least 4+/5 to improve ability for functional tasks               4. Pt will report no difficulty with walking between rooms at home.                5. Pt will report no difficulty with getting shoes or socks on.                               Current:  Pt Don and Doff right sock and shoe without difficulty noted  3/13/19                6. Pt will report no difficulty with lifting a bag of groceries from the floor due to right ankle pain.          PLAN  [x]  Upgrade activities as tolerated     [x]  Continue plan of care  []  Update interventions per flow sheet       []  Discharge due to:_  []  Other:_      Galo Mack, PT 3/13/2019  9:49 AM    Future Appointments   Date Time Provider Monique Bullard   3/18/2019 10:00 AM Phuong New, PT [facility]   3/20/2019 10:00 AM Phuong New PT [facility]   4/22/2019  9:10 AM Stefani Simpson MD [place] [id number]   10/9/2019  9:30 AM Alec Baird MD [address] [place] [address]

## 2019-03-18 ENCOUNTER — HOSPITAL ENCOUNTER (OUTPATIENT)
Dept: PHYSICAL THERAPY | Age: 54
Discharge: HOME | End: 2019-03-18
Payer: MEDICAID

## 2019-03-18 PROCEDURE — 97112 NEUROMUSCULAR REEDUCATION: CPT

## 2019-03-18 PROCEDURE — 97035 APP MDLTY 1+ULTRASOUND EA 15: CPT

## 2019-03-18 PROCEDURE — 97110 THERAPEUTIC EXERCISES: CPT

## 2019-03-18 NOTE — PROGRESS NOTES
PT DAILY TREATMENT NOTE 10-18    Patient Name: Nehal Mg  Date:3/18/2019  : 1965  [x]  Patient  Verified  Payor: [facility] MEDICAID / Plan: Mariana 46 / Product Type: Managed Care Medicaid /    In time:1010  Out time:1053  Total Treatment Time (min): 43  Visit #: 4 of 8    Medicare/BCBS Only   Total Timed Codes (min):   1:1 Treatment Time:          Treatment Area: Back pain [M54.9]    SUBJECTIVE  Pain Level (0-10 scale): 7  Any medication changes, allergies to medications, adverse drug reactions, diagnosis change, or new procedure performed?: [x] No    [] Yes (see summary sheet for update)  Subjective functional status/changes:   [] No changes reported  Still have the pain in my ankle. Felt much better after last visit.   Doing HEP    OBJECTIVE    Modality rationale: decrease inflammation, decrease pain and increase tissue extensibility to improve the patients ability to walk with less pain   Min Type Additional Details    [] Estim:  []Unatt       []IFC  []Premod                        []Other:  []w/ice   []w/heat  Position:  Location:    [] Estim: []Att    []TENS instruct  []NMES                    []Other:  []w/US   []w/ice   []w/heat  Position:  Location:    []  Traction: [] Cervical       []Lumbar                       [] Prone          []Supine                       []Intermittent   []Continuous Lbs:  [] before manual  [] after manual   8 [x]  Ultrasound: []Continuous   [] Pulsed                           []1MHz   []3MHz W/cm2:  Location:    []  Iontophoresis with dexamethasone         Location: [] Take home patch   [] In clinic    []  Ice     []  heat  []  Ice massage  []  Laser   []  Anodyne Position:  Location:    []  Laser with stim  []  Other:  Position:  Location:    []  Vasopneumatic Device Pressure:       [] lo [] med [] hi   Temperature: [] lo [] med [] hi   [x] Skin assessment post-treatment:  [x]intact []redness- no adverse reaction    []redness - adverse reaction:     24 min Therapeutic Exercise:  [x] See flow sheet :   Rationale: increase ROM, increase strength and improve coordination to improve the patients ability to ambulate with less pain  8 min Neuromuscular Re-education:  []  See flow sheet : KT I-Strip for spacing at the right anterior ligament and forefoot    Rationale: increase ROM, increase strength and improve coordination  to improve the patients ability to ambulate with less pain        With   [x] TE   [] TA   [] neuro   [] other: Patient Education: [x] Review HEP    [] Progressed/Changed HEP based on:   [] positioning   [] body mechanics   [] transfers   [] heat/ice application    [] other:      Other Objective/Functional Measures:        Pain Level (0-10 scale) post treatment: 5.5    ASSESSMENT/Changes in Function:   - No significant changes noted from last visit  - Good response to US with less pain reported    Patient will continue to benefit from skilled PT services to modify and progress therapeutic interventions, address functional mobility deficits, address ROM deficits, address strength deficits, analyze and address soft tissue restrictions and analyze and cue movement patterns to attain remaining goals. []  See Plan of Care  []  See progress note/recertification  []  See Discharge Summary         Progress towards goals / Updated goals:  Short Term Goals: To be accomplished in 1 weeks:               1. Pt will be I and compliant with HEP                            RPOCDNJ: Reports compliance and good tolerance with added exercises 2/27/19    Long Term Goals: To be accomplished in 4 weeks:               1. Improve FOTO score to 50 to improve ability for functional tasks               2. Improve right ankle DF with knee extended to neutral for improved gait.                                           Current: Met with DF greater than neutral in long sitting 3/18/19               3. Improve right ankle MMT EV and IV to at least 4+/5 to improve ability for functional tasks               4. Pt will report no difficulty with walking between rooms at home.                5. Pt will report no difficulty with getting shoes or socks on.                               Current:  Pt Don and Doff right sock and shoe without difficulty noted  3/13/19                6. Pt will report no difficulty with lifting a bag of groceries from the floor due to right ankle pain.            PLAN  [x]  Upgrade activities as tolerated     [x]  Continue plan of care  []  Update interventions per flow sheet       []  Discharge due to:_  []  Other:_      Gamal Stuart, PT 3/18/2019  6:16 PM    Future Appointments   Date Time Provider Monique Bullard   3/20/2019 10:00 AM Blondell Gilford, PT [facility]   3/26/2019 10:00 AM Blondell Gilford, PT [facility]   3/28/2019 11:30 AM Blondell Gilford, PT [facility]   4/22/2019  9:10 AM Scott Ramirez MD [facility]   10/9/2019  9:30 AM Brittney Estrada MD [address] [place] [address]

## 2019-03-20 ENCOUNTER — HOSPITAL ENCOUNTER (OUTPATIENT)
Dept: PHYSICAL THERAPY | Age: 54
Discharge: HOME | End: 2019-03-20
Payer: MEDICAID

## 2019-03-20 PROCEDURE — 97140 MANUAL THERAPY 1/> REGIONS: CPT

## 2019-03-20 PROCEDURE — 97035 APP MDLTY 1+ULTRASOUND EA 15: CPT

## 2019-03-20 PROCEDURE — 97110 THERAPEUTIC EXERCISES: CPT

## 2019-03-20 NOTE — PROGRESS NOTES
PT DAILY TREATMENT NOTE 10-18    Patient Name: Melinda Vasquez  Date:3/20/2019  : 1965  [x]  Patient  Verified  Payor: [facility] MEDICAID / Plan: Mariana Sunshine / Product Type: Managed Care Medicaid /    In time:1002  Out time:1105  Total Treatment Time (min): 63  Visit #: 5 of 8    Treatment Area: Back pain [M54.9]    SUBJECTIVE  Pain Level (0-10 scale): 6.5  Any medication changes, allergies to medications, adverse drug reactions, diagnosis change, or new procedure performed?: [x] No    [] Yes (see summary sheet for update)  Subjective functional status/changes:   [] No changes reported  Still sore    OBJECTIVE    Modality rationale: decrease inflammation, decrease pain and increase tissue extensibility to improve the patients ability to return to normal activity    Min Type Additional Details    [] Estim:  []Unatt       []IFC  []Premod                        []Other:  []w/ice   []w/heat  Position:  Location:    [] Estim: []Att    []TENS instruct  []NMES                    []Other:  []w/US   []w/ice   []w/heat  Position:  Location:    []  Traction: [] Cervical       []Lumbar                       [] Prone          []Supine                       []Intermittent   []Continuous Lbs:  [] before manual  [] after manual   8 [x]  Ultrasound: [x]Continuous   [] Pulsed                           []1MHz   [x]3MHz W/cm2: 1.3  Location:Right ankle    []  Iontophoresis with dexamethasone         Location: [] Take home patch   [] In clinic    []  Ice     []  heat  []  Ice massage  []  Laser   []  Anodyne Position:  Location:    []  Laser with stim  []  Other:  Position:  Location:    []  Vasopneumatic Device Pressure:       [] lo [] med [] hi   Temperature: [] lo [] med [] hi   [x] Skin assessment post-treatment:  [x]intact []redness- no adverse reaction    []redness - adverse reaction:     40 min Therapeutic Exercise:  [x] See flow sheet :   Rationale: increase ROM, increase strength and improve coordination to improve the patients ability to increase daily activity    10 min Manual Therapy: Ankle mobs, mortis mobs, KT I-Strip for spacing at the right ankle anterior ligament and across forefoot    Rationale: decrease pain, increase ROM and increase tissue extensibility to return to normal mobility          With   [x] TE   [] TA   [] neuro   [] other: Patient Education: [x] Review HEP    [] Progressed/Changed HEP based on:   [] positioning   [] body mechanics   [] transfers   [] heat/ice application    [] other:      Other Objective/Functional Measures:   - DF 6* at start of manual therapy  - Improved DF to 11* after manual  - Pt amb 440' w/o difficulty      Pain Level (0-10 scale) post treatment: 6.5    ASSESSMENT/Changes in Function:   - pt with an improved gait and ease of motion with ambulation  - Increased AROM Ankle DF at 11* after manual therapy  - Good response to KT  - Overall improved gait, increased ankle mobility, improved function with step ups to 6\" step    Patient will continue to benefit from skilled PT services to modify and progress therapeutic interventions, address functional mobility deficits, address ROM deficits, address strength deficits, analyze and address soft tissue restrictions and analyze and cue movement patterns to attain remaining goals. []  See Plan of Care  []  See progress note/recertification  []  See Discharge Summary         Progress towards goals / Updated goals:  Short Term Goals: To be accomplished in 1 weeks:               1. Pt will be I and compliant with HEP                            [id number]: Reports compliance and good tolerance with added exercises 2/27/19    Long Term Goals: To be accomplished in 4 weeks:               1. Improve FOTO score to 50 to improve ability for functional tasks               2. Improve right ankle DF with knee extended to neutral for improved gait.                                           QNKULCF: Met with DF greater than neutral at 6*in long sitting 3/20/19               3. Improve right ankle MMT EV and IV to at least 4+/5 to improve ability for functional tasks               4. Pt will report no difficulty with walking between rooms at home.                5.  Pt will report no difficulty with getting shoes or socks on.                              Current:  Pt Raymond Martin right sock and shoe without difficulty noted  3/13/19                6. Pt will report no difficulty with lifting a bag of groceries from the floor due to right ankle pain.          PLAN  [x]  Upgrade activities as tolerated     [x]  Continue plan of care  []  Update interventions per flow sheet       []  Discharge due to:_  []  Other:_      Aquilino Meade, PT 3/20/2019  10:19 AM    Future Appointments   Date Time Provider Monique Bullard   3/26/2019 10:00 AM Sudhir Peng, PT [facility]   3/28/2019 11:30 AM Sudhir Peng PT [facility]   4/22/2019  9:10 AM Delmis Larry MD [address]   10/9/2019  9:30 AM Soledad Li MD [address] [place] [address]

## 2019-03-25 ENCOUNTER — HOSPITAL ENCOUNTER (OUTPATIENT)
Dept: PHYSICAL THERAPY | Age: 54
Discharge: HOME | End: 2019-03-25
Payer: MEDICAID

## 2019-03-25 PROCEDURE — 97110 THERAPEUTIC EXERCISES: CPT

## 2019-03-25 PROCEDURE — 97140 MANUAL THERAPY 1/> REGIONS: CPT

## 2019-03-25 PROCEDURE — 97035 APP MDLTY 1+ULTRASOUND EA 15: CPT

## 2019-03-25 NOTE — PROGRESS NOTES
PT DAILY TREATMENT NOTE 10-18    Patient Name: Marychuy Gonzáles  Date:3/25/2019  : 1965  [x]  Patient  Verified  Payor: [facility] MEDICAID / Plan: Mariana Sunshine / Product Type: Managed Care Medicaid /    In time:1032  Out time:1143  Total Treatment Time (min): 64  Visit #: 5 of 8    Treatment Area: Back pain [M54.9]    SUBJECTIVE  Pain Level (0-10 scale): 6.5  Any medication changes, allergies to medications, adverse drug reactions, diagnosis change, or new procedure performed?: [x] No    [] Yes (see summary sheet for update)  Subjective functional status/changes:   [] No changes reported  Still sore    OBJECTIVE    Modality rationale: decrease inflammation, decrease pain and increase tissue extensibility to improve the patients ability to return to normal activity    Min Type Additional Details    [] Estim:  []Unatt       []IFC  []Premod                        []Other:  []w/ice   []w/heat  Position:  Location:    [] Estim: []Att    []TENS instruct  []NMES                    []Other:  []w/US   []w/ice   []w/heat  Position:  Location:    []  Traction: [] Cervical       []Lumbar                       [] Prone          []Supine                       []Intermittent   []Continuous Lbs:  [] before manual  [] after manual   8 [x]  Ultrasound: [x]Continuous   [] Pulsed                           []1MHz   [x]3MHz W/cm2: 1.3  Location:Right ankle    []  Iontophoresis with dexamethasone         Location: [] Take home patch   [] In clinic    [x]  Ice     []  heat  []  Ice massage  []  Laser   []  Anodyne Position:  Location:    []  Laser with stim  []  Other:  Position:  Location:    []  Vasopneumatic Device Pressure:       [] lo [] med [] hi   Temperature: [] lo [] med [] hi   [x] Skin assessment post-treatment:  [x]intact []redness- no adverse reaction    []redness - adverse reaction:     42 min Therapeutic Exercise:  [x] See flow sheet :   Rationale: increase ROM, increase strength and improve coordination to improve the patients ability to increase daily activity    14 min Manual Therapy: Ankle mobs, mortis mobs, KT 2 I-Strips for spacing at the right ankle anterior ligament and across forefoot and the medial distal tibia     Rationale: decrease pain, increase ROM and increase tissue extensibility to return to normal mobility          With   [x] TE   [] TA   [] neuro   [] other: Patient Education: [x] Review HEP    [] Progressed/Changed HEP based on:   [] positioning   [] body mechanics   [] transfers   [] heat/ice application    [] other:      Other Objective/Functional Measures:   - Pt amb 660' w/o difficulty      Pain Level (0-10 scale) post treatment: 6    ASSESSMENT/Changes in Function:   Improved walking tolerance to 660' with minor increased medial ankle pain  Patient will continue to benefit from skilled PT services to modify and progress therapeutic interventions, address functional mobility deficits, address ROM deficits, address strength deficits, analyze and address soft tissue restrictions and analyze and cue movement patterns to attain remaining goals. []  See Plan of Care  []  See progress note/recertification  []  See Discharge Summary         Progress towards goals / Updated goals:  Short Term Goals: To be accomplished in 1 weeks:               1. Pt will be I and compliant with HEP                            JYQUYCE: Reports compliance and good tolerance with added exercises 2/27/19    Long Term Goals: To be accomplished in 4 weeks:               1. Improve FOTO score to 50 to improve ability for functional tasks               2. Improve right ankle DF with knee extended to neutral for improved gait.                                           VWUAMBV: Met with DF greater than neutral at 6*in long sitting 3/20/19               3. Improve right ankle MMT EV and IV to at least 4+/5 to improve ability for functional tasks               4. Pt will report no difficulty with walking between rooms at home.                5.  Pt will report no difficulty with getting shoes or socks on.                              Current:  Pt Thea Ring right sock and shoe without difficulty noted  3/2519                6. Pt will report no difficulty with lifting a bag of groceries from the floor due to right ankle pain.          PLAN  [x]  Upgrade activities as tolerated     [x]  Continue plan of care  []  Update interventions per flow sheet       []  Discharge due to:_  []  Other:_      Salome Sky PT 3/25/2019  1:27 PM    Future Appointments   Date Time Provider Monique Bullard   3/28/2019 11:30 AM Mariama Akers, PT [facility]   4/22/2019  9:10 AM Veronica Simpson MD [place] [id number]   10/9/2019  9:30 AM João Parnell MD [id number] [facility]

## 2019-03-26 ENCOUNTER — APPOINTMENT (OUTPATIENT)
Dept: PHYSICAL THERAPY | Age: 54
End: 2019-03-26
Payer: MEDICAID

## 2019-03-28 ENCOUNTER — HOSPITAL ENCOUNTER (OUTPATIENT)
Dept: PHYSICAL THERAPY | Age: 54
Discharge: HOME | End: 2019-03-28
Payer: MEDICAID

## 2019-03-28 NOTE — PROGRESS NOTES
PT DAILY TREATMENT NOTE 10-18    Patient Name: Johan Sheehan  Date:3/28/2019  : 1965  [x]  Patient  Verified  Payor: [facility] MEDICAID / Plan: [place]ged[place]o  / Product Type: Managed Care Medicaid /    In time:11:34  Out time:1226  Total Treatment Time (min): 52  Visit #: 7 of 8    Medicare/BCBS Only   Total Timed Codes (min):    1:1 Treatment Time:          Treatment Area: Back pain [M54.9]    SUBJECTIVE  Pain Level (0-10 scale): 4  Any medication changes, allergies to medications, adverse drug reactions, diagnosis change, or new procedure performed?: [x] No    [] Yes (see summary sheet for update)  Subjective functional status/changes:   [] No changes reported  Had increased pain after last visit, but the pain is better.  Current pain in the ankle     OBJECTIVE    Modality rationale: decrease inflammation, decrease pain and increase tissue extensibility to improve the patients ability to tolerate increased activity   Min Type Additional Details    [] Estim:  []Unatt       []IFC  []Premod                        []Other:  []w/ice   []w/heat  Position:  Location:    [] Estim: []Att    []TENS instruct  []NMES                    []Other:  []w/US   []w/ice   []w/heat  Position:  Location:    []  Traction: [] Cervical       []Lumbar                       [] Prone          []Supine                       []Intermittent   []Continuous Lbs:  [] before manual  [] after manual   8 [x]  Ultrasound: [x]Continuous   [] Pulsed                           [x]1MHz   []3MHz W/cm2: 1.3  Location: Right ankle    []  Iontophoresis with dexamethasone         Location: [] Take home patch   [] In clinic    []  Ice     []  heat  []  Ice massage  []  Laser   []  Anodyne Position:  Location:    []  Laser with stim  []  Other:  Position:  Location:    []  Vasopneumatic Device Pressure:       [] lo [] med [] hi   Temperature: [] lo [] med [] hi   [x] Skin assessment post-treatment:  [x]intact []redness- no adverse reaction []redness - adverse reaction:     44 min Therapeutic Exercise:  [x] See flow sheet :   Rationale: increase ROM, increase strength and improve coordination to improve the patients ability to stand and walk normally          With   [x] TE   [] TA   [] neuro   [] other: Patient Education: [x] Review HEP    [] Progressed/Changed HEP based on:   [] positioning   [] body mechanics   [] transfers   [] heat/ice application    [] other:      Other Objective/Functional Measures:      Pain Level (0-10 scale) post treatment: 3    ASSESSMENT/Changes in Function:    - Pt showing an improved gait pattern with less pain  - Good tolerance with treatment program with gradual pain modulation    Patient will continue to benefit from skilled PT services to modify and progress therapeutic interventions, address functional mobility deficits, address ROM deficits, address strength deficits, analyze and address soft tissue restrictions and analyze and cue movement patterns to attain remaining goals. []  See Plan of Care  []  See progress note/recertification  []  See Discharge Summary         Progress towards goals / Updated goals:  Short Term Goals: To be accomplished in 1 weeks:               1. Pt will be I and compliant with HEP                            QVYEXMO: Reports compliance and good tolerance with added exercises 2/27/19    Long Term Goals: To be accomplished in 4 weeks:               1. Improve FOTO score to 50 to improve ability for functional tasks               2. Improve right ankle DF with knee extended to neutral for improved gait.                                           CAVEPKM: Met with DF greater than neutral at 6*in long sitting 3/20/19               3. Improve right ankle MMT EV and IV to at least 4+/5 to improve ability for functional tasks    Current:  Good progress with strength training MMT to bew assessed at next visit 3/28/19               4. Pt will report no difficulty with walking between rooms at home.                5.  Pt will report no difficulty with getting shoes or socks on.                              Current:  Pt Almarie Limb and Doff right sock and shoe without difficulty noted  3/2519                6. Pt will report no difficulty with lifting a bag of groceries from the floor due to right ankle pain.       PLAN  [x]  Upgrade activities as tolerated     [x]  Continue plan of care  []  Update interventions per flow sheet       []  Discharge due to:_  []  Other:_      Mak Napoles, PT 3/28/2019  11:56 AM    Future Appointments   Date Time Provider Monique Bullard   4/22/2019  9:10 AM Mika Simpson MD [facility]   10/9/2019  9:30 AM Anna Davis MD [id number] [facility]

## 2019-04-01 ENCOUNTER — TELEPHONE (OUTPATIENT)
Dept: ORTHOPEDIC SURGERY | Age: 54
End: 2019-04-01

## 2019-04-01 DIAGNOSIS — S82.831D CLOSED FRACTURE OF DISTAL END OF RIGHT FIBULA AND TIBIA WITH ROUTINE HEALING, SUBSEQUENT ENCOUNTER: Primary | ICD-10-CM

## 2019-04-01 DIAGNOSIS — S82.301D CLOSED FRACTURE OF DISTAL END OF RIGHT FIBULA AND TIBIA WITH ROUTINE HEALING, SUBSEQUENT ENCOUNTER: Primary | ICD-10-CM

## 2019-04-01 DIAGNOSIS — S82.841D CLOSED BIMALLEOLAR FRACTURE OF RIGHT ANKLE WITH ROUTINE HEALING, SUBSEQUENT ENCOUNTER: ICD-10-CM

## 2019-04-01 DIAGNOSIS — M25.571 ACUTE RIGHT ANKLE PAIN: ICD-10-CM

## 2019-04-01 NOTE — TELEPHONE ENCOUNTER
Patient called for Rodrigo Davenport, and is asking if a new order can be sent to [facility] for her Right Ankle. [facility] at the [facility] location in [place]  tel [phone]. Fax # [phone]. Patient would like a call when done at tel. [phone].

## 2019-04-16 ENCOUNTER — HOSPITAL ENCOUNTER (OUTPATIENT)
Dept: PHYSICAL THERAPY | Age: 54
Discharge: HOME | End: 2019-04-16
Payer: MEDICAID

## 2019-04-16 PROCEDURE — 97110 THERAPEUTIC EXERCISES: CPT

## 2019-04-16 NOTE — PROGRESS NOTES
PT DAILY TREATMENT NOTE 10-18    Patient Name: Denise Brown  Date:2019  : 1965  [x]  Patient  Verified  Payor: Keke Comment / Plan: Lyndsey Johnson / Product Type: DALTON /    In time: 3:04  Out time:3:48  Total Treatment Time (min): 44  Visit #: 8 of 8    Medicare/BCBS Only   Total Timed Codes (min):    1:1 Treatment Time:          Treatment Area: Back pain [M54.9]    SUBJECTIVE  Pain Level (0-10 scale): 6.5  Any medication changes, allergies to medications, adverse drug reactions, diagnosis change, or new procedure performed?: [x] No    [] Yes (see summary sheet for update)  Subjective functional status/changes:   [] No changes reported  Pt reports a feeling of tightness in the ankle with pain at the fold of the ankle. Pain not changing much but increases with standing. My walking is better almost cant tell that there is a limp.     OBJECTIVE      44 min Therapeutic Exercise:  [x] See flow sheet : Progress assessment   Rationale: increase ROM, increase strength and improve coordination to improve the patients ability to return to normal          With   [] TE   [] TA   [] neuro   [] other: Patient Education: [x] Review HEP    [] Progressed/Changed HEP based on:   [] positioning   [] body mechanics   [] transfers   [] heat/ice application    [] other:      Other Objective/Functional Measures:   - DF 11* PF 45, IV 20, EV 25  - MMT Right Ankle DF 5  PF 5 IV 4+  EV 5  - FOTO 36     Pain Level (0-10 scale) post treatment: 6    ASSESSMENT/Changes in Function:   - Pt showing good progress with treatment program  - Right ankle strength and ROM WNL  - Improved ambulation with little to no right antalgic gait  - Continued reports of pain at the ankle fold    Patient will continue to benefit from skilled PT services to modify and progress therapeutic interventions, address functional mobility deficits, address ROM deficits, address strength deficits, analyze and address soft tissue restrictions and analyze and cue movement patterns to attain remaining goals. []  See Plan of Care  []  See progress note/recertification  []  See Discharge Summary         Progress towards goals / Updated goals:  Short Term Goals: To be accomplished in 1 weeks:               1. Pt will be I and compliant with HEP                            MBQZFVM: Reports compliance and good tolerance with added exercises 2/27/19    Long Term Goals: To be accomplished in 4 weeks:               1. Improve FOTO score to 50 to improve ability for functional tasks    Current: Not met at 36 today a decrease of 2 points from initial assessment despite significant improvement               2. Improve right ankle DF with knee extended to neutral for improved gait.                           BTFKZZC: Met with DF greater than neutral at [address] sitting 4/16/19/19               3. Improve right ankle MMT EV and IV to at least 4+/5 to improve ability for functional tasks                            Current:  Met at 4+/5 IV and 5/5 all other planes 4/16/19               4. Pt will report no difficulty with walking between rooms at home    Current:  Met Can walk much better with no difficulty walking between rooms at home 4/16/19.                5.  Pt will report no difficulty with getting shoes or socks on.                              Current:  Met 4/16/19               6. Pt will report no difficulty with lifting a bag of groceries from the floor due to right ankle pain.      Current: Continued ankle pain with carrying groceries 4/16/19      PLAN  [x]  Upgrade activities as tolerated     [x]  Continue plan of care  []  Update interventions per flow sheet       []  Discharge due to:_  []  Other:_      Hanna Buckner, GAIL 4/16/2019  3:21 PM    Future Appointments   Date Time Provider Monique Bullard   4/22/2019  9:10 AM MD Lorrie Lance 67   4/23/2019 10:00 AM Jian Garcia, PT [facility]   4/25/2019 12:00 PM Jian Garcia PT [facility] 4/29/2019  9:30 AM Maryhelen Burkitt, PT [place]   5/6/2019 11:00 AM Sg Salvador MD [facility]   10/9/2019  9:30 AM Andi Carver MD [address] [place] [address]

## 2019-04-18 NOTE — PROGRESS NOTES
[facility] at [address] [facility] [facility], [address] [place] [address]  Phone: [phone]      Fax:  [phone]    Progress Note  Patient name: Minh Hunter Start of Care: 2/15/2019   Referral source: Butch Otero MD : 1965                Medical Diagnosis: Right ankle pain [M25.571]  Payor: [place] / Plan:     / Product Type: Medicaid /  Onset Date:surgery 2017                Treatment Diagnosis: right distal tib/fib fx (2017) - chronic pain   Prior Hospitalization: see medical history Provider#: [id number]   Medications: Verified on Patient summary List    Comorbidities: anxiety, latex allergy, right ankle surgery   Prior Level of Function: functionally I with all activities       Visits from Start of Care: 8    Missed Visits: 2    Established Goals:          Excellent Good         Limited           None  [] Increased ROM   []  [x]  []  []  [] Increased Strength  []  [x]  []  []  [] Increased Mobility  []  [x]  []  []   [] Decreased Pain   []  []  [x]  [x]  [] Decreased Swelling  []  []  []  []    Key Functional Changes: Patient has shown good progress with this treatment program. Pain as of last visit was 6.5/10. Patient has shown no significant change in pain level but demonstrates increased strength, increased mobility and an improved gait pattern. See other objective measures below for additional details. Patient reports some improvement with overall involvement but not much change in pain level. FOTO score is 36, a decrease of 2 points from initial assessment. All STG/LTGs achieved as identified below.     Other Objective/Functional Measures/Assessment:   - DF 11* PF 45, IV 20, EV 25  - MMT Right Ankle DF 5  PF 5 IV 4+  EV 5  - Right ankle strength and ROM WNL  - Improved ambulation with little to no right antalgic gait  - Continued reports of pain at the ankle fold            Progress towards goals / Updated goals:  Short Term Goals: To be accomplished in 1 weeks:               1. Pt will be I and compliant with HEP                            WEBHLSB: Reports compliance and good tolerance with added exercises 2/27/19    Long Term Goals: To be accomplished in 4 weeks:               1. Improve FOTO score to 50 to improve ability for functional tasks                            Current: Not met at 36 today a decrease of 2 points from initial assessment despite significant improvement               2. Improve right ankle DF with knee extended to neutral for improved gait.                           FPGSXZW: Met with DF greater than neutral at [address] sitting 4/16/19/19               3. Improve right ankle MMT EV and IV to at least 4+/5 to improve ability for functional tasks                            OSIDMNJ:  Met at 4+/5 IV and 5/5 all other planes 4/16/19               4. Pt will report no difficulty with walking between rooms at home                            Current:  Met Can walk much better with no difficulty walking between rooms at home 4/16/19.                5. Pt will report no difficulty with getting shoes or socks on.                              Current:  Met 4/16/19               6. Pt will report no difficulty with lifting a bag of groceries from the floor due to right ankle pain.                             Current: Continued ankle pain with carrying groceries 4/16/19       Fall Risk Assessment: Patient demonstrates no Fall Risk   Updated Goals: to be achieved in 10 treatments:  1.  Pt will have decreased pain at 3/10 or better to allow pt to perform normal activity with little pain              Status at last note/certification: Pain 6.5   Current Status:             2. Pt will amb on TM for 1/2 mile with pain 3/10 or better for carryover to performing activity community ambulation              Status at last note/certification: Initiated   Current Status:             3.  Pt will report no difficulty with lifting a bag of groceries from the floor due to right ankle pain              Status at last note/certification: Reports difficulty lifting a bag of groceries   Current Status:             4.  Pt will demonstrate lift and carry of 25# or more with good pace little to no antalgic gait for tolerance with performing usual household tasks with little difficulty              Status at last note/certification: Difficulty lifting groceries from floor   Current Status:               ASSESSMENT/RECOMMENDATIONS:  [x]Continue therapy per initial plan/protocol at a frequency of  3 x per week for 10 visits  [x]Continue therapy with the following recommended changes: Add E-Stim for pain modulation  []Discontinue therapy due to lack of appreciable progress towards goals  []Discontinue therapy due to lack of attendance or compliance  []Await Physician's recommendations/decisions regarding therapy  []Other:________________________________________________________________    Thank you for this referral.   Messi Urrutia, PT 4/18/2019 6:32 PM  NOTE TO PHYSICIAN:  PLEASE COMPLETE THE ORDERS BELOW AND   FAX TO [facility]: [phone]  If you are unable to process this request in 24 hours please contact our office:   [phone]  []  I have read the above report and request that my patient continue as recommended. []  I have read the above report and request that my patient continue therapy with the following changes/special instructions:________________________________________  []I have read the above report and request that my patient be discharged from therapy.     [de-identified] Signature:____________Date:_________TIME:________    Leandra Wei, Date and Time must be completed for valid certification **

## 2019-04-22 ENCOUNTER — OFFICE VISIT (OUTPATIENT)
Dept: ORTHOPEDIC SURGERY | Age: 54
End: 2019-04-22

## 2019-04-22 VITALS
BODY MASS INDEX: 26.16 KG/M2 | WEIGHT: 157 LBS | RESPIRATION RATE: 12 BRPM | HEIGHT: 65 IN | OXYGEN SATURATION: 99 % | DIASTOLIC BLOOD PRESSURE: 69 MMHG | TEMPERATURE: 96.6 F | HEART RATE: 66 BPM | SYSTOLIC BLOOD PRESSURE: 113 MMHG

## 2019-04-22 DIAGNOSIS — S82.301D CLOSED FRACTURE OF DISTAL END OF RIGHT FIBULA AND TIBIA WITH ROUTINE HEALING, SUBSEQUENT ENCOUNTER: Primary | ICD-10-CM

## 2019-04-22 DIAGNOSIS — S82.831D CLOSED FRACTURE OF DISTAL END OF RIGHT FIBULA AND TIBIA WITH ROUTINE HEALING, SUBSEQUENT ENCOUNTER: Primary | ICD-10-CM

## 2019-04-22 NOTE — PROGRESS NOTES
1. Have you been to the ER, urgent care clinic since your last visit? Hospitalized since your last visit? No    2. Have you seen or consulted any other health care providers outside of the [address] [place] [address] since your last visit? Include any pap smears or colon screening.  No

## 2019-04-22 NOTE — PROGRESS NOTES
AMBULATORY PROGRESS NOTE      Patient: Aly Gonzalez             MRN: [id number]     SSN: [id number] Body mass index is 26.13 kg/m². YOB: 1965     AGE: 52 y.o. EX: female    PCP: Aishwarya Canales MD       IMPRESSION//  DIAGNOSIS AND TREATMENT PLAN      DIAGNOSES  1. Closed fracture of distal end of right fibula and tibia with routine healing, subsequent encounter        No orders of the defined types were placed in this encounter. PLAN  HPI //  OBJECTIVE EXAMINATION       Aly Gonzalez IS A 52 y.o. female who presents to my outpatient office for evaluation of:     So, she is doing a bit better in terms of her range of motion. She had surgery by Dr. Anibal Hunter in, I believe, August 2017, for severe distal tibia and fibula fractures. Her last set of x-rays done February 11, 2019, showed a healed fibula fracture and healed distal tibia fracture. She is currently in physical therapy. She would like to stop the physical therapy, as she has improved a bit in terms of range of motion, but she is still having pain every day. Other options for her would be to get her into Pain Management for [facility], but we are going to hold off on that at this point. She is retired. She stopped two of her jobs. Again, it is to be noted she had surgery almost two years ago this July for really severe distal tibia and fibula fractures, as well as a pilon fracture. This was done on the right side. I did recommend Mederma Ointment for the hypertrophic scar that she has at the distal-most extent of her well-healed, anterior surgical incision. Denies: CP, SOB, ABD PAIN, Calf pain    Visit Vitals  /69   Pulse 66   Temp 96.6 °F (35.9 °C) (Oral)   Resp 12   Ht 5' 5\" (1.651 m)   Wt 157 lb (71.2 kg)   SpO2 99%   BMI 26.13 kg/m²       Appearance: Alert, well appearing and pleasant patient who is in no distress, oriented to person, place/time, and who follows commands. This patient is accompanied in the examination room by her  self. Psychiatric: Affect and mood are appropriate. Patient arrives to office via: without assistive device:    HEENT: Head normocephalic & atraumatic. Both pupils are round, non icteric sclera     Eye: EOM are intact and sclera are clear    Neck: ROM WNL      Hearings Intact, does not require hearing aid device  Respiratory: Breathing is unlabored without accessory chest muscle use  Cardiovascular/Peripheral Vascular: Normal Pulses to each hand and foot  Abdomen: Soft//NT//ND and BS ++// No rebound tenderness    ANKLE/FOOT right     Gait: Slight limp  Tenderness: Mild tenderness to the peroneal tendons.                         HO NT with stress  Cutaneous: Well healed surgical scars, anteriorly and laterally, from subtalar fusion, with hypertrophy.                           Palpable fibula plate laterally  Joint Motion: Decreased inversion of hindfoot                          Full eversion                          Near-full plantarflexion,                           Diminished dorsiflexion, just about neutral.                          PF arch better than DF arch. Joint / Tendon Stability: No Ankle or Subtalar instability or joint laxity.                                           No peroneal sublux ability or dislocation  Alignment: Forefoot, Midfoot, Hindfoot WNL. Neuro Motor/Sensory: NL/NL. Vascular: NL foot/ankle pulses. Lymphatics: No extremity lymphedema, No calf swelling, no tenderness to calf muscles. CHART REVIEW          Past Medical History:   Diagnosis Date    Colon polyps     Constipation     Heart murmur     Hemorrhoids     Mitral valve prolapse     Nausea & vomiting     \"VIOLENTLY SICK AFTER TUBAL LIGATION\"    Plantar fasciitis     Vestibular neuritis      Current Outpatient Medications   Medication Sig    alendronate (FOSAMAX) 70 mg tablet Take  by mouth.  busPIRone (BUSPAR) 15 mg tablet Take 7.5 mg by mouth daily.     etodolac (LODINE) 400 mg tablet TAKE 1 TABLET BY MOUTH 2 TIMES A DAY WITH MEALS    cycloSPORINE (RESTASIS MULTIDOSE) 0.05 % drop ophthalmic drops 1 Drop two (2) times a day.  coenzyme q10 (CO Q-10) 10 mg cap Take  by mouth.  red yeast rice extract 600 mg cap Take 600 mg by mouth now.  calcium-cholecalciferol, d3, (CALCIUM 600 + D) 600-125 mg-unit tab Take  by mouth. No current facility-administered medications for this visit. Allergies   Allergen Reactions    Latex Other (comments)     \"YEAST INFECTION\"    Wasp [Venom-Wasp] Anaphylaxis    Amoxicillin Other (comments)     Gave pt bad ha's    Flagyl [Metronidazole] Other (comments)     Pt states headaches and vomiting      Urised [Meth-Hyos-Atrp-M Blue-Ba-Phsal] Itching and Swelling     Past Surgical History:   Procedure Laterality Date    COLONOSCOPY N/A 10/12/2018    COLONOSCOPY performed by Jaye Herbert MD at [address] HX BREAST LUMPECTOMY      right    HX COLONOSCOPY      2010,2013    HX CYST REMOVAL  2010    right breast    HX GYN      hysterectomy    HX HEENT      tonselectomy    HX HEENT      lasik eye surgery    HX OTHER SURGICAL      cystoscopy    HX OTHER SURGICAL  2010    right ovary removed    HX TUBAL LIGATION       Social History     Occupational History    Not on file   Tobacco Use    Smoking status: Never Smoker    Smokeless tobacco: Never Used   Substance and Sexual Activity    Alcohol use: Yes     Comment: OCCASIONALLY    Drug use: No    Sexual activity: Not on file     Family History   Problem Relation Age of Onset    Cancer Sister     Cancer Father         colon    Cancer Maternal Grandmother     Hypertension Mother     Cancer Mother              REVIEW OF SYSTEMS : 4/22/2019  ALL BELOW ARE Negative except : SEE HPI       Constitutional: Negative for fever, chills and weight loss.  Neg Weight Loss  Cardiovascular: Negative for chest pain, claudication and leg swelling. SOB, PORTER   Gastrointestinal/Urological: Negative for pain, N/V/D/C, Blood in stool or urine,dysuria,  Hematuria, Incontinence  Endocrine: See HPI  Skin: see HPI  Musculoskeletal: see HPI. Neurological: Negative for dizziness and weakness, headaches,Visual Changes or   Confusion, or Seizures,   Psychiatric/Behavioral: Negative for depression, memory loss and substance abuse. Extremities:  Negative for hair changes, rash or skin lesion changes. Hematologic: Negative for Bleeding problems, bruising, pallor or swollen lymph nodes. Peripheral Vascular: No calf pain, vascular vein tenderness to calf pain              No calf throbbing, posterior knee throbbing pain     DIAGNOSTIC IMAGING          No notes on file     Please see above section of this report. I have personally reviewed the results of the above study. The interpretation of this study is my professional opinion.       Aly Adams MD  4/22/2019  10:30 AM

## 2019-04-22 NOTE — PATIENT INSTRUCTIONS
You may try over the counter Mederma to help with the scaring. Follow up in 3 months or sooner as needed.

## 2019-04-23 ENCOUNTER — APPOINTMENT (OUTPATIENT)
Dept: PHYSICAL THERAPY | Age: 54
End: 2019-04-23
Payer: MEDICAID

## 2019-04-25 ENCOUNTER — HOSPITAL ENCOUNTER (OUTPATIENT)
Dept: PHYSICAL THERAPY | Age: 54
Discharge: HOME | End: 2019-04-25
Payer: MEDICAID

## 2019-04-25 PROCEDURE — 97110 THERAPEUTIC EXERCISES: CPT

## 2019-04-25 PROCEDURE — 97530 THERAPEUTIC ACTIVITIES: CPT

## 2019-04-29 ENCOUNTER — HOSPITAL ENCOUNTER (OUTPATIENT)
Dept: PHYSICAL THERAPY | Age: 54
Discharge: HOME | End: 2019-04-29
Payer: MEDICAID

## 2019-04-29 PROCEDURE — 97530 THERAPEUTIC ACTIVITIES: CPT

## 2019-04-29 PROCEDURE — 97110 THERAPEUTIC EXERCISES: CPT

## 2019-04-29 NOTE — PROGRESS NOTES
PT DISCHARGE DAILY NOTE AND [id number]    Date:2019  Patient name: Meli Massey Joan of Care: 2/15/2019   Referral source: Luis Simpson MD : 1965                Medical Diagnosis: Right ankle pain [M25.571]  Payor: [place] / Plan: [address]  / Product Type: Medicaid /  Onset Date:surgery 2017                Treatment Diagnosis: right distal tib/fib fx (2017) - chronic pain   Prior Hospitalization: see medical history Provider#: [id number]   Medications: Verified on Patient summary List    Comorbidities: anxiety, latex allergy, right ankle surgery   Prior Level of Function: functionally I with all activities     Visits from Start of Care: 9    Missed Visits: 2    Reporting Period : 2/15/19 to 19    Date:2019  : 1965  [x]  Patient  Verified  Payor: Jordin Hernandez / [facility]: [facility] H[facility] / Product Type: HMO /    In [id number]  Out time:1031  Total Treatment Time (min): 54  Visit #: 2 of 10    Medicare/BCBS Only   Total Timed Codes (min):  54 1:1 Treatment Time:  54       SUBJECTIVE  Pain Level (0-10 scale): 5.5  Any medication changes, allergies to medications, adverse drug reactions, diagnosis change, or new procedure performed?: [x] No    [] Yes (see summary sheet for update)  Subjective functional status/changes:   [] No changes reported  Pt reports 90% improvement with overall involvement.     OBJECTIVE        45 min Therapeutic Exercise:  [x] See flow sheet : Assess for DC   Rationale: increase ROM, increase strength and improve coordination to improve the patients ability to return to normal activity    9 min Therapeutic Activity:  [x]  See flow sheet : TM 1/4 mile   Rationale: increase ROM, increase strength and improve coordination  to improve the patients ability to tolerate increased walking        With   [x] TE   [] TA   [] neuro   [] other: Patient Education: [x] Review HEP    [] Progressed/Changed HEP based on:   [] positioning   [] body mechanics   [] transfers   [] heat/ice application    [] other:      Other Objective/Functional Measures:  - Lift and carry 20# 40'  - AROM DF 11*  IV 14, EV 21  - Pt amb 1/4 mile in 9 min without fatigue         Pain Level (0-10 scale) post treatment: 5.5    Summary of Care:  Updated Goals: to be achieved in 10 treatments:  1. Pt will have decreased pain at 3/10 or better to allow pt to perform normal activity with little pain                         Status at last note/certification: Pain 6.5              Current Status:   Not Met Pain 5.5/10 today 4/29/19          2. Pt will amb on TM for 1/2 mile with pain 3/10 or better for carryover to performing activity community ambulation                         Status at last note/certification: Initiated              Current Status: Not Met, Progressing at 1/4 mile with good tolerance pain 6/10 4/25/19             3.  Pt will report no difficulty with lifting a bag of groceries from the floor due to right ankle pain                         Status at last note/certification: Reports difficulty lifting a bag of groceries              Current Status:Met at 20# with no difficulty 4/29/19            4.  Pt will demonstrate lift and carry of 25# or more with good pace little to no antalgic gait for tolerance with performing usual household tasks with little difficulty                         Status at last note/certification2 Difficulty lifting groceries from floor              Current Status:  Progressing at 10# with minor added pain or antalgic gait   4/29/19           ASSESSMENT/Changes in Function: Patient has shown good progress with this treatment program. Pain as of last visit was 5.5/10. Patient has shown slow progress for pain modulation but demonstrates good progress with increased strength and mobility of the right ankle/foot. Patient reports 90% improvement with overall involvement. FOTO score is 41. All STG/LTGs achieved as identified below.     Fall Risk Assessment: Patient demonstrates no Fall Risk     Thank you for this referral!      PLAN  [x]Discontinue therapy: [x]Patient has reached or is progressing toward set goals      []Patient is non-compliant or has abdicated      []Due to lack of appreciable progress towards set goals    Morris Andrade, PT 4/29/2019  9:48 AM

## 2019-05-06 ENCOUNTER — TELEPHONE (OUTPATIENT)
Dept: ORTHOPEDIC SURGERY | Age: 54
End: 2019-05-06

## 2019-05-06 ENCOUNTER — OFFICE VISIT (OUTPATIENT)
Dept: SURGERY | Age: 54
End: 2019-05-06

## 2019-05-06 VITALS
WEIGHT: 153 LBS | OXYGEN SATURATION: 98 % | HEART RATE: 69 BPM | RESPIRATION RATE: 18 BRPM | DIASTOLIC BLOOD PRESSURE: 72 MMHG | BODY MASS INDEX: 25.49 KG/M2 | HEIGHT: 65 IN | SYSTOLIC BLOOD PRESSURE: 107 MMHG | TEMPERATURE: 97.9 F

## 2019-05-06 DIAGNOSIS — K64.0 GRADE I HEMORRHOIDS: Primary | ICD-10-CM

## 2019-05-06 NOTE — LETTER
5/6/19 Patient: Mary Lou Mendoza YOB: 1965 Date of Visit: 5/6/2019 Luba Merino MD 
[address] [place], [place] [id number] [place] [address] VIA Facsimile: [phone] Dear Brenda Ohara, I saw Samuel Chin in the office today regarding anorectal discomfort. On exam she has grade 1 hemorrhoids. I recommended a high-fiber diet with a fiber supplement. Should her symptoms persist I told her she can follow-up in the office and we will consider hemorrhoidectomy. If you have questions, please do not hesitate to call me. I look forward to following your patient along with you. Sincerely, Kentrell Bourgeois MD

## 2019-05-06 NOTE — PROGRESS NOTES
Subjective: She is seen in follow-up. She had a colonoscopy which was normal.  She complains of swelling in the perianal area. This is been worse for the last 2 months. She denies any drainage, fevers or chills. She has itching as well. She states that hemorrhoids bulge in particular when she is working out doing squats. She moves her bowels on a daily basis. She uses apples to help with bowel function but does have some mild constipation. She denies any blood per rectum. She denies fecal incontinence. Past medical history and ROS were reviewed and unchanged. Abdomen: Soft, nontender nondistended  Rectum: 3 quadrant external hemorrhoids, no fissure  Digital rectal exam: Moderate tone, no mass  Anoscopy: Mild internal hemorrhoids    Assessment / Plan    Grade 1 hemorrhoids  High-fiber diet, fiber supplement  Follow-up in the office to consider hemorrhoidectomy should she still have discomfort    A total of 15 minutes was spent with the patient, with >50% of time spent on counseling and coordination of care. The diagnoses and plan were discussed with patient. All questions answered. Plan of care agreed to by all concerned.

## 2019-05-06 NOTE — TELEPHONE ENCOUNTER
Patient stopped by the [facility] location asking for a clincal staff to call her. She is trying tro get the discharge summary or the progress note from PT but they are telling her she needs to get osmething from us before they can give it to her.     Patient can be reached at [phone]

## 2019-07-22 ENCOUNTER — TELEPHONE (OUTPATIENT)
Dept: CARDIOLOGY CLINIC | Age: 54
End: 2019-07-22

## 2019-07-22 ENCOUNTER — OFFICE VISIT (OUTPATIENT)
Dept: ORTHOPEDIC SURGERY | Age: 54
End: 2019-07-22

## 2019-07-22 VITALS
HEART RATE: 65 BPM | TEMPERATURE: 96.6 F | WEIGHT: 151.6 LBS | HEIGHT: 65 IN | SYSTOLIC BLOOD PRESSURE: 100 MMHG | BODY MASS INDEX: 25.26 KG/M2 | RESPIRATION RATE: 15 BRPM | DIASTOLIC BLOOD PRESSURE: 59 MMHG | OXYGEN SATURATION: 94 %

## 2019-07-22 DIAGNOSIS — S82.301D CLOSED FRACTURE OF DISTAL END OF RIGHT FIBULA AND TIBIA WITH ROUTINE HEALING, SUBSEQUENT ENCOUNTER: Primary | ICD-10-CM

## 2019-07-22 DIAGNOSIS — S82.831D CLOSED FRACTURE OF DISTAL END OF RIGHT FIBULA AND TIBIA WITH ROUTINE HEALING, SUBSEQUENT ENCOUNTER: Primary | ICD-10-CM

## 2019-07-22 NOTE — PATIENT INSTRUCTIONS
Arthritis: Care Instructions  Your Care Instructions  Arthritis, also called osteoarthritis, is a breakdown of the cartilage that cushions your joints. When the cartilage wears down, your bones rub against each other. This causes pain and stiffness. Many people have some arthritis as they age. Arthritis most often affects the joints of the spine, hands, hips, knees, or feet. You can take simple measures to protect your joints, ease your pain, and help you stay active. Follow-up care is a key part of your treatment and safety. Be sure to make and go to all appointments, and call your doctor if you are having problems. It's also a good idea to know your test results and keep a list of the medicines you take. How can you care for yourself at home? · Stay at a healthy weight. Being overweight puts extra strain on your joints. · Talk to your doctor or physical therapist about exercises that will help ease joint pain. ? Stretch. You may enjoy gentle forms of yoga to help keep your joints and muscles flexible. ? Walk instead of jog. Other types of exercise that are less stressful on the joints include riding a bicycle, swimming, rafa chi, or water exercise. ? Lift weights. Strong muscles help reduce stress on your joints. Stronger thigh muscles, for example, take some of the stress off of the knees and hips. Learn the right way to lift weights so you do not make joint pain worse. · Take your medicines exactly as prescribed. Call your doctor if you think you are having a problem with your medicine. · Take pain medicines exactly as directed. ? If the doctor gave you a prescription medicine for pain, take it as prescribed. ? If you are not taking a prescription pain medicine, ask your doctor if you can take an over-the-counter medicine. · Use a cane, crutch, walker, or another device if you need help to get around. These can help rest your joints.  You also can use other things to make life easier, such as a higher toilet seat and padded handles on kitchen utensils. · Do not sit in low chairs, which can make it hard to get up. · Put heat or cold on your sore joints as needed. Use whichever helps you most. You also can take turns with hot and cold packs. ? Apply heat 2 or 3 times a day for 20 to 30 minutesusing a heating pad, hot shower, or hot packto relieve pain and stiffness. ? Put ice or a cold pack on your sore joint for 10 to 20 minutes at a time. Put a thin cloth between the ice and your skin. When should you call for help? Call your doctor now or seek immediate medical care if:    · You have sudden swelling, warmth, or pain in any joint.     · You have joint pain and a fever or rash.     · You have such bad pain that you cannot use a joint.    Watch closely for changes in your health, and be sure to contact your doctor if:    · You have mild joint symptoms that continue even with more than 6 weeks of care at home.     · You have stomach pain or other problems with your medicine. Where can you learn more? Go to http://angela-michelle.info/. Enter O931 in the search box to learn more about \"Arthritis: Care Instructions. \"  Current as of: April 1, 2019  Content Version: 12.1  © 5000-0328 [employer]. Care instructions adapted under license by [employer] (which disclaims liability or warranty for this information). If you have questions about a medical condition or this instruction, always ask your healthcare professional. [place] [address] any warranty or liability for your use of this information.

## 2019-07-22 NOTE — PROGRESS NOTES
AMBULATORY PROGRESS NOTE      Patient: Adrian Best             MRN: [id number]     SSN: [id number] Body mass index is 25.23 kg/m². YOB: 1965     AGE: 52 y.o. SEX: female    PCP: Beau Dominguez MD     IMPRESSION/DIAGNOSIS AND TREATMENT PLAN     DIAGNOSES  1. Closed fracture of distal end of right fibula and tibia with routine healing, subsequent encounter        Orders Placed This Encounter    [74662] Ankle Min 3V      Adrian Best understands her diagnoses and the proposed plan. Plan:    1) Continue activity modification as directed. RTO - PRN     HPI AND EXAMINATION     Meli Kent IS A 52 y.o. female who presents to my outpatient office for follow up of a closed fracture of the distal end of the right fibula and tibia. At the last visit, I instructed the patient to continue activity modification as directed and to use Mederma cream over the hypertrophic scar. Since we saw her last, Ms. Balbina Jordan states that she is still experiencing daily pain in her right ankle. She describes her pain as achy and notes that it feels as though she has pressure in her anterior mid-tibia area. The patient inquires about whether a TENS unit would be beneficial for her. She recalls that at her last session of PT, she had low frequency US, and she notes that this helped with her pain for a bit. She reports that she had an appointment with Rickford Hodgkin with endocrinology, who recommended her to remain on Fosamax. She reports that she takes it once a week, every Monday. She states that she tries to work out three days a week. X-rays show post-traumatic changes to her ankle with a fibula plate. There is a well-healed tibia fracture with a tibial plate. There is some joint space narrowing seen to the anterior portion of the ankle joint. Otherwise, no osteolytic or osteoblastic lesions are seen. There is some soft tissue swelling seen anteriorly.   The subtalar joint is not as well visualized on these ankle films. No additional fracture, subluxation, or dislocation is seen on these x-rays. The patient does have ORIF of a distal tibia and fibula fracture treated by Dr. Koby Craig of [address] in the year of August 2017. She is almost two years out from her surgery. Visit Vitals  /59   Pulse 65   Temp 96.6 °F (35.9 °C) (Oral)   Resp 15   Ht 5' 5\" (1.651 m)   Wt 151 lb 9.6 oz (68.8 kg)   SpO2 94%   BMI 25.23 kg/m²     Appearance: Alert, well appearing and pleasant patient who is in no distress, oriented to person, place/time, and who follows commands. This patient is accompanied in the examination room by her friend. Psychiatric: Affect and mood are appropriate. Patient arrives to office via: without assistive device:    HEENT: Head normocephalic & atraumatic. Both pupils are round, non icteric sclera              Eye: EOM are intact and sclera are clear               Neck: ROM WNL               Hearings Intact, does not require hearing aid device  Respiratory: Breathing is unlabored without accessory chest muscle use  Cardiovascular/Peripheral Vascular: Normal Pulses to each foot     ANKLE/FOOT right     Gait: Slight limp  Tenderness: Mild tenderness to the peroneal tendons.                         EX NT with stress  Cutaneous: Well healed surgical scars, anteriorly and laterally, from subtalar fusion, with hypertrophy.                           Palpable fibula plate laterally  Joint Motion: Decreased inversion of hindfoot                          Full eversion                          Near-full plantarflexion,                           Diminished dorsiflexion, just about neutral.                          PF arch better than DF arch.   Joint / Tendon Stability: No Ankle or Subtalar instability or joint laxity.                                           No peroneal sublux ability or dislocation  Alignment: Forefoot, Midfoot, Hindfoot WNL.    Neuro Motor/Sensory: NL/NL. Vascular: NL foot/ankle pulses. Lymphatics: No extremity lymphedema, No calf swelling, no tenderness to calf muscles. CHART REVIEW     Past Medical History:   Diagnosis Date    Colon polyps     Constipation     Heart murmur     Hemorrhoids     Mitral valve prolapse     Nausea & vomiting     \"VIOLENTLY SICK AFTER TUBAL LIGATION\"    Plantar fasciitis     Vestibular neuritis      Current Outpatient Medications   Medication Sig    alendronate (FOSAMAX) 70 mg tablet Take  by mouth.  busPIRone (BUSPAR) 15 mg tablet Take 7.5 mg by mouth daily.  etodolac (LODINE) 400 mg tablet TAKE 1 TABLET BY MOUTH 2 TIMES A DAY WITH MEALS    cycloSPORINE (RESTASIS MULTIDOSE) 0.05 % drop ophthalmic drops 1 Drop two (2) times a day.  red yeast rice extract 600 mg cap Take 600 mg by mouth now.  calcium-cholecalciferol, d3, (CALCIUM 600 + D) 600-125 mg-unit tab Take  by mouth.  coenzyme q10 (CO Q-10) 10 mg cap Take  by mouth. No current facility-administered medications for this visit.       Allergies   Allergen Reactions    Latex Other (comments)     \"YEAST INFECTION\"    Wasp [Venom-Wasp] Anaphylaxis    Amoxicillin Other (comments)     Gave pt bad ha's    Flagyl [Metronidazole] Other (comments)     Pt states headaches and vomiting      Urised [Meth-Hyos-Atrp-M Blue-Ba-Phsal] Itching and Swelling     Past Surgical History:   Procedure Laterality Date    COLONOSCOPY N/A 10/12/2018    COLONOSCOPY performed by James Landa MD at [place] [id number] [facility] HX BREAST LUMPECTOMY      right    HX COLONOSCOPY      2010,2013    HX CYST REMOVAL  2010    right breast    HX GYN      hysterectomy    HX HEENT      tonselectomy    HX HEENT      lasik eye surgery    HX OTHER SURGICAL      cystoscopy    HX OTHER SURGICAL  2010    right ovary removed    HX TUBAL LIGATION       Social History     Occupational History    Not on file   Tobacco Use    Smoking status: Never Smoker    Smokeless tobacco: Never Used   Substance and Sexual Activity    Alcohol use: Yes     Comment: OCCASIONALLY    Drug use: No    Sexual activity: Not on file     Family History   Problem Relation Age of Onset    Cancer Sister     Cancer Father         colon    Cancer Maternal Grandmother     Hypertension Mother     Cancer Mother     Colon Cancer Paternal Aunt     Cancer Paternal Uncle         prostate        REVIEW OF SYSTEMS : 7/22/2019  ALL BELOW ARE Negative except : SEE HPI     Constitutional: Negative for fever, chills and weight loss. Neg Weight Loss  Cardiovascular: Negative for chest pain, claudication and leg swelling. SOB, PORTER   Gastrointestinal/Urological: Negative for  pain, N/V/D/C, Blood in stool or urine,dysuria                         Hematuria, Incontinence, pelvic pain  Musculoskeletal: see HPI. Neurological: Negative for dizziness and weakness, headaches,Visual Changes             Confusion,  Or Seizures,   Psychiatric/Behavioral: Negative for depression, memory loss and substance abuse. Extremities:  Negative for hair changes, rash or skin lesion changes. Hematologic: Negative for Bleeding problems, bruising, pallor or swollen lymph nodes. Peripheral Vascular: No calf pain, vascular vein tenderness to calf pain              No calf throbbing, posterior knee throbbing pain     DIAGNOSTIC IMAGING     No notes on file    Please see above section of this report. I have personally reviewed the results of the above study. The interpretation of this study is my professional opinion. Written by Barry Bullard, as dictated by Dr. Mariaa Suero. I, Dr. Mariaa Suero, confirm that all documentation is accurate.

## 2019-07-22 NOTE — PROGRESS NOTES
1. Have you been to the ER, urgent care clinic since your last visit? Hospitalized since your last visit? No    2. Have you seen or consulted any other health care providers outside of the [address] [place] [address] since your last visit? Include any pap smears or colon screening.  No

## 2019-08-23 DIAGNOSIS — S82.841S CLOSED BIMALLEOLAR FRACTURE OF RIGHT ANKLE, SEQUELA: ICD-10-CM

## 2019-08-23 DIAGNOSIS — M25.571 ACUTE RIGHT ANKLE PAIN: ICD-10-CM

## 2019-08-23 NOTE — TELEPHONE ENCOUNTER
Last Visit: 7/22/19 with MD Simpson  Next Appointment: none  Previous Refill Encounter(s): 11/14/18 #60    Requested Prescriptions     Pending Prescriptions Disp Refills    etodolac (LODINE) 400 mg tablet 60 Tab 0     Sig: Take 400 mg by mouth two (2) times daily (with meals).

## 2019-08-26 RX ORDER — ETODOLAC 400 MG/1
400 TABLET, FILM COATED ORAL 2 TIMES DAILY WITH MEALS
Qty: 60 TAB | Refills: 0 | OUTPATIENT
Start: 2019-08-26

## 2019-09-03 RX ORDER — ETODOLAC 400 MG/1
TABLET, FILM COATED ORAL
Qty: 60 TAB | Refills: 0 | Status: SHIPPED | OUTPATIENT
Start: 2019-09-03 | End: 2019-11-01 | Stop reason: SDUPTHER

## 2019-09-03 NOTE — TELEPHONE ENCOUNTER
Regarding RX:   etodolac (LODINE) 400 mg tablet [[id number]]      Patient called in asking for the status of this med refill. Patient advised she sent in the request for the refill on 08/23/19 and still hasn't heard back. Patient advised only has a few pills left before she runs out completely. I advised would have nurses call her back to discuss.      Patient can be reached at : [phone]

## 2019-10-16 ENCOUNTER — OFFICE VISIT (OUTPATIENT)
Dept: CARDIOLOGY CLINIC | Age: 54
End: 2019-10-16

## 2019-10-16 VITALS
WEIGHT: 156 LBS | HEIGHT: 65 IN | DIASTOLIC BLOOD PRESSURE: 63 MMHG | BODY MASS INDEX: 25.99 KG/M2 | HEART RATE: 68 BPM | SYSTOLIC BLOOD PRESSURE: 108 MMHG

## 2019-10-16 DIAGNOSIS — R00.2 PALPITATIONS: Primary | ICD-10-CM

## 2019-10-16 DIAGNOSIS — R42 DIZZINESS: ICD-10-CM

## 2019-10-16 NOTE — PROGRESS NOTES
1. Have you been to the ER, urgent care clinic since your last visit? Hospitalized since your last visit? NO    2. Have you seen or consulted any other health care providers outside of the [address] [place] [address] since your last visit? Include any pap smears or colon screening. Yes, pcp and allergy doctor    3. Since your last visit, have you had any of the following symptoms? Panis attacks causes sob, chest pain Swelling causes for fx        4. Have you had any blood work, X-rays or cardiac testing? yes ay pcp              5.  Where do you normally have your labs drawn?  pcp    6. Do you need any refills today?    no

## 2019-10-16 NOTE — PROGRESS NOTES
HISTORY OF PRESENT ILLNESS  Evon Cisneros is a 62 y.o. female. Irregular Heart Beat    The history is provided by the patient. This is a chronic problem. The problem has not changed since onset. The problem occurs every several days. Associated symptoms include malaise/fatigue and shortness of breath. Pertinent negatives include no diaphoresis, no fever, no claudication, no orthopnea, no PND, no nausea, no vomiting, no dizziness, no weakness, no cough, no hemoptysis and no sputum production. Risk factors include no risk factors. Shortness of Breath   The history is provided by the patient. The problem has been resolved. Pertinent negatives include no fever, no ear pain, no neck pain, no cough, no sputum production, no hemoptysis, no wheezing, no PND, no orthopnea, no vomiting, no rash, no leg swelling and no claudication. Associated medical issues do not include CAD or heart failure. Review of Systems   Constitutional: Positive for malaise/fatigue. Negative for chills, diaphoresis, fever and weight loss. HENT: Negative for congestion, ear discharge, ear pain, hearing loss, nosebleeds and tinnitus. Eyes: Negative for blurred vision. Respiratory: Positive for shortness of breath. Negative for cough, hemoptysis, sputum production, wheezing and stridor. Cardiovascular: Positive for palpitations. Negative for orthopnea, claudication, leg swelling and PND. Gastrointestinal: Negative for heartburn, nausea and vomiting. Musculoskeletal: Negative for myalgias and neck pain. Skin: Negative for itching and rash. Neurological: Negative for dizziness, tingling, tremors, focal weakness, loss of consciousness and weakness. Psychiatric/Behavioral: Negative for depression and suicidal ideas.      Family History   Problem Relation Age of Onset    Cancer Sister     Cancer Father         colon    Cancer Maternal Grandmother     Hypertension Mother     Cancer Mother     Colon Cancer Paternal Aunt     Cancer Paternal Uncle         prostate       Past Medical History:   Diagnosis Date    Colon polyps     Constipation     Heart murmur     Hemorrhoids     Mitral valve prolapse     Nausea & vomiting     \"VIOLENTLY SICK AFTER TUBAL LIGATION\"    Plantar fasciitis     Vestibular neuritis        Past Surgical History:   Procedure Laterality Date    COLONOSCOPY N/A 10/12/2018    COL[facility] performed by Le Coker MD at [facility] HX BREAST LUMPECTOMY      right    HX COLONOSCOPY      2010,2013    HX CYST REMOVAL  2010    right breast    HX GYN      hysterectomy    HX HEENT      tonselectomy    HX HEENT      lasik eye surgery    HX OTHER SURGICAL      cystoscopy    HX OTHER SURGICAL  2010    right ovary removed    HX TUBAL LIGATION         Social History     Tobacco Use    Smoking status: Never Smoker    Smokeless tobacco: Never Used   Substance Use Topics    Alcohol use: Yes     Comment: OCCASIONALLY       Allergies   Allergen Reactions    Latex Other (comments)     \"YEAST INFECTION\"    Wasp [Venom-Wasp] Anaphylaxis    Egg Swelling     Thick phlegm     Amoxicillin Other (comments)     Gave pt bad ha's    Flagyl [Metronidazole] Other (comments)     Pt states headaches and vomiting      Urised [Meth-Hyos-Atrp-M Blue-Ba-Phsal] Itching and Swelling       Outpatient Medications Marked as Taking for the 10/16/19 encounter (Office Visit) with Niya Hernandez MD   Medication Sig Dispense Refill    etodolac (LODINE) 400 mg tablet TAKE 1 TABLET BY MOUTH 2 TIMES A DAY WITH MEALS (Patient taking differently: Take 400 mg by mouth daily. TAKE 1 TABLET BY MOUTH 2 TIMES A DAY WITH MEALS) 60 Tab 0    alendronate (FOSAMAX) 70 mg tablet Take  by mouth.  busPIRone (BUSPAR) 15 mg tablet Take 15 mg by mouth two (2) times a day.  cycloSPORINE (RESTASIS MULTIDOSE) 0.05 % drop ophthalmic drops 1 Drop two (2) times a day.       coenzyme q10 (CO Q-10) 10 mg cap Take  by mouth.  red yeast rice extract 600 mg cap Take 600 mg by mouth now.  calcium-cholecalciferol, d3, (CALCIUM 600 + D) 600-125 mg-unit tab Take  by mouth. Visit Vitals  /63   Pulse 68   Ht 5' 5\" (1.651 m)   Wt 70.8 kg (156 lb)   BMI 25.96 kg/m²     Physical Exam   Constitutional: She is oriented to person, place, and time. She appears well-developed and well-nourished. No distress. HENT:   Head: Atraumatic. Mouth/Throat: No oropharyngeal exudate. Eyes: Conjunctivae are normal. Right eye exhibits no discharge. Left eye exhibits no discharge. No scleral icterus. Neck: Normal range of motion. Neck supple. No JVD present. No tracheal deviation present. No thyromegaly present. Cardiovascular: Normal rate and regular rhythm. Exam reveals no gallop. No murmur heard. Pulmonary/Chest: Effort normal and breath sounds normal. No stridor. She has no wheezes. She has no rales. Abdominal: Soft. There is no tenderness. There is no rebound and no guarding. Musculoskeletal: Normal range of motion. She exhibits no edema or tenderness. Lymphadenopathy:     She has no cervical adenopathy. Neurological: She is alert and oriented to person, place, and time. She exhibits normal muscle tone. Skin: Skin is warm. She is not diaphoretic. Psychiatric: She has a normal mood and affect. Her behavior is normal.     ekg sinus rhythm with no acute st-t changes  10/15/18   ECHO ADULT COMPLETE 10/19/2018 10/19/2018    Narrative · Normal left ventricular cavity size, wall thickness and systolic   function (ejection fraction normal). The estimated ejection fraction is 56   - 60%. No regional wall motion abnormality noted. There is mild (grade 1)   left ventricular diastolic dysfunction. · Normal cavity size. LA index is 27.76 ml/m2. · Normal right ventricular size and function. · Trace tricuspid valve regurgitation is present. Pulmonary arterial   systolic pressure is 23 mmHg.  There is no evidence of pulmonary   hypertension. · Mild mitral annular calcification. · No hemodynamically significant valvular pathology. · Pulmonary arterial systolic pressure is 71.1 mmHg. Signed by: Fabian Gerard MD     ASSESSMENT and PLAN    ICD-10-CM ICD-9-CM    1. Palpitations R00.2 785.1 AMB POC EKG ROUTINE W/ 12 LEADS, INTER & REP    rare   2. Dizziness R42 780.4      Orders Placed This Encounter    AMB POC EKG ROUTINE W/ 12 LEADS, INTER & REP     Order Specific Question:   Reason for Exam:     Answer:   palpitations     Follow-up and Dispositions    · Return if symptoms worsen or fail to improve. current treatment plan is effective, no change in therapy. Patient seen for sob and palpitations  No syncope  No orthostatic hypotension in the clinic today      Echo report reviewed with patient. No significant valvular heart disease. Cardiac monitoring reviewed patient had triggered monitor several times however no significant ventricular or atrial arrhythmias were noted. No pauses were noted.     Seen for f/u- no new symptoms  ekg - normal sinus rhythm with no acute st-t changes  Advised to follow up with PCP

## 2019-11-01 ENCOUNTER — TELEPHONE (OUTPATIENT)
Dept: ORTHOPEDIC SURGERY | Age: 54
End: 2019-11-01

## 2019-11-01 DIAGNOSIS — S82.841S CLOSED BIMALLEOLAR FRACTURE OF RIGHT ANKLE, SEQUELA: ICD-10-CM

## 2019-11-01 DIAGNOSIS — M25.571 ACUTE RIGHT ANKLE PAIN: ICD-10-CM

## 2019-11-01 RX ORDER — ETODOLAC 400 MG/1
TABLET, FILM COATED ORAL
Qty: 60 TAB | Refills: 0 | Status: SHIPPED | OUTPATIENT
Start: 2019-11-01 | End: 2020-07-16

## 2019-11-01 NOTE — TELEPHONE ENCOUNTER
Patient called asking for a refill on her prescription Lodine to be sent to her pharmacy [id number] [address] [facility], [address]  Please advise patient at [phone]

## 2020-01-15 ENCOUNTER — OFFICE VISIT (OUTPATIENT)
Dept: ORTHOPEDIC SURGERY | Age: 55
End: 2020-01-15

## 2020-01-15 VITALS
OXYGEN SATURATION: 100 % | HEART RATE: 73 BPM | SYSTOLIC BLOOD PRESSURE: 114 MMHG | BODY MASS INDEX: 25.86 KG/M2 | RESPIRATION RATE: 15 BRPM | DIASTOLIC BLOOD PRESSURE: 73 MMHG | WEIGHT: 155.2 LBS | TEMPERATURE: 96.7 F | HEIGHT: 65 IN

## 2020-01-15 DIAGNOSIS — S82.841S CLOSED BIMALLEOLAR FRACTURE OF RIGHT ANKLE, SEQUELA: Primary | ICD-10-CM

## 2020-01-15 RX ORDER — ETODOLAC 400 MG/1
400 TABLET, FILM COATED ORAL 2 TIMES DAILY WITH MEALS
Qty: 60 TAB | Refills: 3 | Status: SHIPPED | OUTPATIENT
Start: 2020-01-15 | End: 2020-03-26

## 2020-01-15 RX ORDER — BISMUTH SUBSALICYLATE 262 MG
1 TABLET,CHEWABLE ORAL DAILY
COMMUNITY

## 2020-01-15 RX ORDER — LIDOCAINE 50 MG/G
PATCH TOPICAL
Qty: 15 PATCH | Refills: 0 | Status: SHIPPED | OUTPATIENT
Start: 2020-01-15 | End: 2021-07-30

## 2020-01-15 RX ORDER — CHOLECALCIFEROL (VITAMIN D3) 125 MCG
CAPSULE ORAL
COMMUNITY

## 2020-01-15 NOTE — PROGRESS NOTES
AMBULATORY PROGRESS NOTE      Patient: Marci Renner             MRN: [id number]     SSN: [id number] Body mass index is 25.83 kg/m². YOB: 1965     AGE: 62 y.o. SEX: female    PCP: Wellington Rivera MD     IMPRESSION/DIAGNOSIS AND TREATMENT PLAN     DIAGNOSES  1. Closed bimalleolar fracture of right ankle, sequela        Orders Placed This Encounter    etodolac (LODINE) 400 mg tablet    lidocaine (LIDODERM) 5 %      Marci Renner understands her diagnoses and the proposed plan. 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 Nothing operative at this current time. I recommend continued conservative care. We will try the two modalities below and try the recommendations as listed as below. I cannot appreciate a positive Tinel's sign to the anterior surgical scar, but she has some discomfort along the musculature just lateral to the surgical scar in one focal region. In the proximal one-third region of her surgical site, there is about a 1-centimeter region where I can palpate some discomfort involving the musculature. The Tinel's sign is negative. The DPN nerve intact. The dorsalis pedis artery is intact. Plan:     1) Lodine 400 m PO BID; 60 tablets, 0 refills. 2) Lidoderm 5% Patch: Cut to fit Q24HS; 15 each, 0 refill. 3) Patient wished to continue her current treatment. RTO - 4 months      HPI AND EXAMINATION     Meli Kent IS A 62 y.o. female who presents to my outpatient office for follow up of a closed fracture of the distal end of the right fibula and tibia. At the last visit, I instructed the patient to continue activity modification. Since we saw her last, Ms. Marsha Patiño states that      Visit Vitals  /73 (BP 1 Location: Left arm, BP Patient Position: Sitting)   Pulse 73   Temp 96.7 °F (35.9 °C) (Oral)   Resp 15   Ht 5' 5\" (1.651 m)   Wt 155 lb 3.2 oz (70.4 kg)   SpO2 100%   BMI 25.83 kg/m² Appearance: Alert, well appearing and pleasant patient who is in no distress, oriented to person, place/time, and who follows commands. This patient is accompanied in the examination room by her friend. Psychiatric: Affect and mood are appropriate. Patient arrives to office via: without assistive device:    HEENT: Head normocephalic & atraumatic. Both pupils are round, non icteric sclera              Eye: EOM are intact and sclera are clear               Neck: ROM WNL               Hearings Intact, does not require hearing aid device  Respiratory: Breathing is unlabored without accessory chest muscle use  Cardiovascular/Peripheral Vascular: Normal Pulses to each foot     ANKLE/FOOT right     Gait: Slight limp  Tenderness: Mild tenderness to the peroneal tendons.                         AF NT with stress  Cutaneous: Well healed surgical scars, anteriorly and laterally, from subtalar fusion, with hypertrophy.                           Palpable fibula plate laterally  Joint Motion: Decreased inversion of hindfoot                          Full eversion                          Near-full plantarflexion,                           Diminished dorsiflexion, just about neutral.                          PF arch better than DF arch.   Joint / Tendon Stability: No Ankle or Subtalar instability or joint laxity.                                           No peroneal sublux ability or dislocation  Alignment: Forefoot, Midfoot, Hindfoot WNL. Neuro Motor/Sensory: NL/NL. Vascular: NL foot/ankle pulses. Lymphatics: No extremity lymphedema, No calf swelling, no tenderness to calf muscles.     CHART REVIEW     Past Medical History:   Diagnosis Date    Colon polyps     Constipation     Heart murmur     Hemorrhoids     Mitral valve prolapse     Nausea & vomiting     \"VIOLENTLY SICK AFTER TUBAL LIGATION\"    Plantar fasciitis     Vestibular neuritis      Current Outpatient Medications   Medication Sig    multivitamin (ONE A DAY) tablet Take 1 Tab by mouth daily.  cholecalciferol, vitamin D3, (VITAMIN D3) 2,000 unit tab Take  by mouth.  etodolac (LODINE) 400 mg tablet Take 400 mg by mouth two (2) times daily (with meals).  lidocaine (LIDODERM) 5 % Apply patch to the affected area for 12 hours a day and remove for 12 hours a day. Cut to fit.  etodolac (LODINE) 400 mg tablet TAKE 1 TABLET BY MOUTH 2 TIMES A DAY WITH MEALS    alendronate (FOSAMAX) 70 mg tablet Take  by mouth.  red yeast rice extract 600 mg cap Take 600 mg by mouth now.  calcium-cholecalciferol, d3, (CALCIUM 600 + D) 600-125 mg-unit tab Take  by mouth.  busPIRone (BUSPAR) 15 mg tablet Take 15 mg by mouth two (2) times a day.  cycloSPORINE (RESTASIS MULTIDOSE) 0.05 % drop ophthalmic drops 1 Drop two (2) times a day.  coenzyme q10 (CO Q-10) 10 mg cap Take  by mouth. No current facility-administered medications for this visit.       Allergies   Allergen Reactions    Latex Other (comments)     \"YEAST INFECTION\"    Wasp [Venom-Wasp] Anaphylaxis    Egg Swelling     Thick phlegm     Amoxicillin Other (comments)     Gave pt bad ha's    Flagyl [Metronidazole] Other (comments)     Pt states headaches and vomiting      Urised [Meth-Hyos-Atrp-M Blue-Ba-Phsal] Itching and Swelling     Past Surgical History:   Procedure Laterality Date    COLONOSCOPY N/A 10/12/2018    COLONOSCOPY performed by Robert Dexter MD at [place] [id number] [facility] HX BREAST LUMPECTOMY      right    HX COLONOSCOPY      2010,2013    HX CYST REMOVAL  2010    right breast    HX GYN      hysterectomy    HX HEENT      tonselectomy    HX HEENT      lasik eye surgery    HX OTHER SURGICAL      cystoscopy    HX OTHER SURGICAL  2010    right ovary removed    HX TUBAL LIGATION       Social History     Occupational History    Not on file   Tobacco Use    Smoking status: Never Smoker    Smokeless tobacco: Never Used   Substance and Sexual Activity  Alcohol use: Yes     Comment: OCCASIONALLY    Drug use: No    Sexual activity: Not on file     Family History   Problem Relation Age of Onset    Cancer Sister     Cancer Father         colon    Cancer Maternal Grandmother     Hypertension Mother     Cancer Mother     Colon Cancer Paternal Aunt     Cancer Paternal Uncle         prostate        REVIEW OF SYSTEMS : 1/15/2020  ALL BELOW ARE Negative except : SEE HPI     Constitutional: Negative for fever, chills and weight loss. Neg Weight Loss  Cardiovascular: Negative for chest pain, claudication and leg swelling. SOB, PORTER   Gastrointestinal/Urological: Negative for  pain, N/V/D/C, Blood in stool or urine,dysuria                         Hematuria, Incontinence, pelvic pain  Musculoskeletal: see HPI. Neurological: Negative for dizziness and weakness, headaches,Visual Changes             Confusion,  Or Seizures,   Psychiatric/Behavioral: Negative for depression, memory loss and substance abuse. Extremities:  Negative for hair changes, rash or skin lesion changes. Hematologic: Negative for Bleeding problems, bruising, pallor or swollen lymph nodes. Peripheral Vascular: No calf pain, vascular vein tenderness to calf pain              No calf throbbing, posterior knee throbbing pain     DIAGNOSTIC IMAGING     No notes on file    Please see above section of this report. I have personally reviewed the results of the above study. The interpretation of this study is my professional opinion. Written by Rabia Garcia, as dictated by Dr. Madai Yun. I, Dr. Madai Yun, confirm that all documentation is accurate.

## 2020-01-15 NOTE — PROGRESS NOTES
1. Have you been to the ER, urgent care clinic since your last visit? Hospitalized since your last visit? No    2. Have you seen or consulted any other health care providers outside of the [address] [place] [address] since your last visit? Include any pap smears or colon screening.  No

## 2020-01-17 ENCOUNTER — TELEPHONE (OUTPATIENT)
Dept: ORTHOPEDIC SURGERY | Age: 55
End: 2020-01-17

## 2020-01-17 DIAGNOSIS — M25.571 ACUTE RIGHT ANKLE PAIN: ICD-10-CM

## 2020-01-17 DIAGNOSIS — S82.841S CLOSED BIMALLEOLAR FRACTURE OF RIGHT ANKLE, SEQUELA: ICD-10-CM

## 2020-01-17 NOTE — TELEPHONE ENCOUNTER
Patient called questioning the milligram dosage for Etodolac. She only got 10 pills of 400 mg. She thought she was getting 60 pills at 40 mg. Please clarify and advise. She is requesting this be done today please as she is in a lot of pain.       [phone] Patient

## 2020-01-17 NOTE — TELEPHONE ENCOUNTER
Spoke with patient, she states there was a \"shortage\" of the medication at the pharmacy. Patient states the pharmacy did a \"partial fill\" and will call her once the medication is received at the pharmacy.

## 2020-01-17 NOTE — TELEPHONE ENCOUNTER
The Rx written for Lodine 400 mg tabs was correct. Patient did not receive the correct number of tablets from the pharmacy. She should have received 60 tables (with 3 refills on the rx).     Roosevelt Basilio PA-C  1/17/2020  12:11 PM

## 2020-02-03 ENCOUNTER — TELEPHONE (OUTPATIENT)
Dept: ORTHOPEDIC SURGERY | Age: 55
End: 2020-02-03

## 2020-02-03 NOTE — TELEPHONE ENCOUNTER
Called patient. Patient states that she is having numbness in the great right toe on the affected side. Patient transferred to the [facility] to schedule an appointment.

## 2020-02-03 NOTE — TELEPHONE ENCOUNTER
Patient called and stated that Dr. Jolene Sher is currently treating her for her ankle, she stated that she is having an issue with a toe on the same foot, and would like to be advised on if its worthy of coming in office to be treated for or if a nurse could give her a call to discuss this.      Patient tel: [phone]

## 2020-02-05 ENCOUNTER — OFFICE VISIT (OUTPATIENT)
Dept: ORTHOPEDIC SURGERY | Age: 55
End: 2020-02-05

## 2020-02-05 ENCOUNTER — DOCUMENTATION ONLY (OUTPATIENT)
Dept: ORTHOPEDIC SURGERY | Age: 55
End: 2020-02-05

## 2020-02-05 VITALS
RESPIRATION RATE: 16 BRPM | HEIGHT: 65 IN | WEIGHT: 155.4 LBS | TEMPERATURE: 96.5 F | OXYGEN SATURATION: 100 % | HEART RATE: 64 BPM | DIASTOLIC BLOOD PRESSURE: 68 MMHG | SYSTOLIC BLOOD PRESSURE: 130 MMHG | BODY MASS INDEX: 25.89 KG/M2

## 2020-02-05 DIAGNOSIS — S82.831D CLOSED FRACTURE OF DISTAL END OF RIGHT FIBULA WITH ROUTINE HEALING, UNSPECIFIED FRACTURE MORPHOLOGY, SUBSEQUENT ENCOUNTER: Primary | ICD-10-CM

## 2020-02-05 DIAGNOSIS — G57.91 NEURITIS OF FOOT, RIGHT: ICD-10-CM

## 2020-02-05 RX ORDER — LIDOCAINE 50 MG/G
1 PATCH TOPICAL EVERY 24 HOURS
Qty: 1 PACKAGE | Refills: 0 | Status: SHIPPED | OUTPATIENT
Start: 2020-02-05 | End: 2020-08-26 | Stop reason: SDUPTHER

## 2020-02-05 NOTE — PROGRESS NOTES
AMBULATORY PROGRESS NOTE      Patient: Tabitha Lesch             MRN: [id number]     SSN: [id number] Body mass index is 25.86 kg/m². YOB: 1965     AGE: 62 y.o. SEX: female    PCP: Reatha Primrose, MD     IMPRESSION/DIAGNOSIS AND TREATMENT PLAN     DIAGNOSES  1. Closed fracture of distal end of right fibula with routine healing, unspecified fracture morphology, subsequent encounter    2. Neuritis of foot, right        Orders Placed This Encounter    lidocaine (LIDODERM) 5 %      Tabitha Lesch understands her diagnoses and the proposed plan. So, she has numbness in the terminal branch of the medial cutaneous nerve in the great toe and terminal branch of the SPN nerve along the distal phalanx only. Tinel's sign is negative. She still has some mild discomfort to the hypertrophic surgical scar anteriorly with mildly positive Tinel's when I palpate over this region of her ankle, the anterior portion of her ankle. I gave her a prescription for the Lidoderm patch, cut to fit, 12 hours on and 12 hours off. She will apply this to the dorsal part of her ankle, anterior portion of her ankle, not to the medial portion of the great toe. We will reassess her in about two months. I did give her the prescription today. The other prescription she had in the past, two weeks ago, got printed out, but she did not receive it. Plan:     1)  Continue to monitor right foot pain  2) Anticipate anti-inflammatory medication if condition does not improve. 3) Lidoderm 5% Patch: Cut to fit Q24HS; 15 each, 0 refill. RTO - 2 months     HPI AND EXAMINATION     Meli Kent IS A 62 y.o. female who presents to my outpatient office for follow up of a closed fracture of the distal end of the right fibula and tibia. At the last visit, I prescribed Lodine 400mg, Lidoderm 5% patch, continue conservative care, and instructed the patient to continue activity modification as directed.     Since we saw her last, Ms. Jhoana Morales states that the pain and numbness that she has been feeling recently may be due to a callous on her right foot or is result of her previous injury. She denies any hx of diabetes or back issues. She notes that she attended PT back in August for her previous foot injury but has not been seen or evaluated since then. She states that she sometimes feels tightness and cramps that may radiate up to her leg and causes her foot to \"lock\". She states that she never received the Lidoderm patches. Visit Vitals  /68   Pulse 64   Temp 96.5 °F (35.8 °C) (Oral)   Resp 16   Ht 5' 5\" (1.651 m)   Wt 155 lb 6.4 oz (70.5 kg)   SpO2 100%   BMI 25.86 kg/m²     Appearance: Alert, well appearing and pleasant patient who is in no distress, oriented to person, place/time, and who follows commands. This patient is accompanied in the examination room by her friend. Psychiatric: Affect and mood are appropriate. Patient arrives to office via: without assistive device:    HEENT: Head normocephalic & atraumatic. Both pupils are round, non icteric sclera              Eye: EOM are intact and sclera are clear               Neck: ROM WNL               Hearings Intact, does not require hearing aid device  Respiratory: Breathing is unlabored without accessory chest muscle use  Cardiovascular/Peripheral Vascular: Normal Pulses to each foot     ANKLE/FOOT right     Gait: Slight limp  Tenderness: Mild tenderness to the peroneal tendons.                            OP NT with stress  Cutaneous: Well healed surgical scars, anteriorly and laterally, from subtalar fusion, with hypertrophy.                           Palpable fibula plate laterally  Joint Motion: Decreased inversion of hindfoot                          Full eversion                          Near-full plantarflexion,                           Diminished dorsiflexion, just about neutral.                          OC arch better than DF arch.   Joint / Tendon Stability: No Ankle or Subtalar instability or joint laxity.                                           No peroneal sublux ability or dislocation  Alignment: Forefoot, Midfoot, Hindfoot WNL. Neuro Motor/Sensory: NL/NL. Decreased/diminished sensation with monofilament test TO MEDIAL GREAT TOE DISTAL PHALANX  Vascular: NL foot/ankle pulses. Lymphatics: No extremity lymphedema, No calf swelling, no tenderness to calf muscles. CHART REVIEW     Past Medical History:   Diagnosis Date    Colon polyps     Constipation     Heart murmur     Hemorrhoids     Mitral valve prolapse     Nausea & vomiting     \"VIOLENTLY SICK AFTER TUBAL LIGATION\"    Plantar fasciitis     Vestibular neuritis      Current Outpatient Medications   Medication Sig    lidocaine (LIDODERM) 5 % 1 Patch by TransDERmal route every twenty-four (24) hours. Apply patch to the affected area for 12 hours a day and remove for 12 hours a day.  multivitamin (ONE A DAY) tablet Take 1 Tab by mouth daily.  cholecalciferol, vitamin D3, (VITAMIN D3) 2,000 unit tab Take  by mouth.  etodolac (LODINE) 400 mg tablet Take 400 mg by mouth two (2) times daily (with meals).  lidocaine (LIDODERM) 5 % Apply patch to the affected area for 12 hours a day and remove for 12 hours a day. Cut to fit.  etodolac (LODINE) 400 mg tablet TAKE 1 TABLET BY MOUTH 2 TIMES A DAY WITH MEALS    alendronate (FOSAMAX) 70 mg tablet Take  by mouth.  busPIRone (BUSPAR) 15 mg tablet Take 15 mg by mouth two (2) times a day.  cycloSPORINE (RESTASIS MULTIDOSE) 0.05 % drop ophthalmic drops 1 Drop two (2) times a day.  coenzyme q10 (CO Q-10) 10 mg cap Take  by mouth.  red yeast rice extract 600 mg cap Take 600 mg by mouth now.  calcium-cholecalciferol, d3, (CALCIUM 600 + D) 600-125 mg-unit tab Take  by mouth. No current facility-administered medications for this visit.       Allergies   Allergen Reactions    Latex Other (comments)     \"YEAST INFECTION\"    Wasp [Venom-Wasp] Anaphylaxis    Egg Swelling     Thick phlegm     Amoxicillin Other (comments)     Gave pt bad ha's    Flagyl [Metronidazole] Other (comments)     Pt states headaches and vomiting      Urised [Meth-Hyos-Atrp-M Blue-Ba-Phsal] Itching and Swelling     Past Surgical History:   Procedure Laterality Date    COLONOSCOPY N/A 10/12/2018    COLONOSCOPY performed by Jay Zhang MD at [place] [id number] [facility] HX BREAST LUMPECTOMY      right    HX COLONOSCOPY      2010,2013    HX CYST REMOVAL  2010    right breast    HX GYN      hysterectomy    HX HEENT      tonselectomy    HX HEENT      lasik eye surgery    HX OTHER SURGICAL      cystoscopy    HX OTHER SURGICAL  2010    right ovary removed    HX TUBAL LIGATION       Social History     Occupational History    Not on file   Tobacco Use    Smoking status: Never Smoker    Smokeless tobacco: Never Used   Substance and Sexual Activity    Alcohol use: Yes     Comment: OCCASIONALLY    Drug use: No    Sexual activity: Not on file     Family History   Problem Relation Age of Onset    Cancer Sister     Cancer Father         colon    Cancer Maternal Grandmother     Hypertension Mother     Cancer Mother     Colon Cancer Paternal Aunt     Cancer Paternal Uncle         prostate        REVIEW OF SYSTEMS : 2/5/2020  ALL BELOW ARE Negative except : SEE HPI     Constitutional: Negative for fever, chills and weight loss. Neg Weight Loss  Cardiovascular: Negative for chest pain, claudication and leg swelling. SOB, PORTER   Gastrointestinal/Urological: Negative for  pain, N/V/D/C, Blood in stool or urine,dysuria                         Hematuria, Incontinence, pelvic pain  Musculoskeletal: see HPI. Neurological: Negative for dizziness and weakness, headaches,Visual Changes             Confusion,  Or Seizures,   Psychiatric/Behavioral: Negative for depression, memory loss and substance abuse.    Extremities:  Negative for hair changes, rash or skin lesion changes. Hematologic: Negative for Bleeding problems, bruising, pallor or swollen lymph nodes. Peripheral Vascular: No calf pain, vascular vein tenderness to calf pain              No calf throbbing, posterior knee throbbing pain     DIAGNOSTIC IMAGING     No notes on file    Please see above section of this report. I have personally reviewed the results of the above study. The interpretation of this study is my professional opinion. Written by Nette Samuels, as dictated by Dr. Aram Wilkerson. I, Dr. Aram Wilkerson, confirm that all documentation is accurate.

## 2020-02-05 NOTE — PROGRESS NOTES
Patient dropped off at [facility] forms for Serious Medical Condition Cert Form. Patient will  paperwork when completed at the [facility] location. Patient is aware of the 0-46 business day policy.   Patient can be reached at [phone]

## 2020-02-05 NOTE — PROGRESS NOTES
1. Have you been to the ER, urgent care clinic since your last visit? Hospitalized since your last visit? No    2. Have you seen or consulted any other health care providers outside of the [address] [place] [address] since your last visit? Include any pap smears or colon screening.  No

## 2020-02-10 NOTE — PROGRESS NOTES
Serious Medical Condition form completed, copy to scanning and patient advised she may  at [facility] location.

## 2020-04-01 ENCOUNTER — TELEPHONE (OUTPATIENT)
Dept: ORTHOPEDIC SURGERY | Age: 55
End: 2020-04-01

## 2020-04-01 NOTE — TELEPHONE ENCOUNTER
Patient scheduled for Monday 4/6. Called to reschedule to virtual visit and patient states she is doing about the same, no better, no worse but was hoping to get an order for a custom orthotic. Patient was again offered the virtual appt so that she could discuss that with Dr. Freda Mendoza but patient stated she had another virtual appt last week with another doctor and did not like it due to a lot of lagging. She would like to know if she is able to get the order for the orthotic or if she would have to have the virtual visit. Please advise.

## 2020-04-02 DIAGNOSIS — S82.831D CLOSED FRACTURE OF DISTAL END OF RIGHT FIBULA WITH ROUTINE HEALING, UNSPECIFIED FRACTURE MORPHOLOGY, SUBSEQUENT ENCOUNTER: Primary | ICD-10-CM

## 2020-04-02 DIAGNOSIS — G57.91 NEURITIS OF FOOT, RIGHT: ICD-10-CM

## 2020-04-02 NOTE — TELEPHONE ENCOUNTER
Order place for custom orthotic. [address] notifying patient that she may  order at [facility] location. Instructed patient to call back once she has received the orthotic to schedule a virtual appointment, since due to the corona virus, we are only seeing post op and urgent patients at this time.

## 2020-04-02 NOTE — TELEPHONE ENCOUNTER
Okay to provide order for custom orthotic as requested. Patient will need to schedule a virtual visit after the orthotic has been received in 4 weeks.     Keeley Lopez PA-C  4/2/2020   10:40 AM

## 2020-04-13 ENCOUNTER — TELEPHONE (OUTPATIENT)
Dept: ORTHOPEDIC SURGERY | Age: 55
End: 2020-04-13

## 2020-04-13 NOTE — TELEPHONE ENCOUNTER
Spoke with patient and informed her we will send order and office notes to [facility] to submit for auth.

## 2020-04-13 NOTE — TELEPHONE ENCOUNTER
Pt states her [employer] is requesting office notes and authorization for her orthotic of the rt foot.       Pt can be reached at,  M#[phone]

## 2020-04-15 NOTE — TELEPHONE ENCOUNTER
I spoke with [facility] to see if they received order for orthotic. [facility] states they received order and patient had appt with them yesterday. [facility] state they will contact our office if they need anything else from us.

## 2020-05-13 ENCOUNTER — TELEPHONE (OUTPATIENT)
Dept: ORTHOPEDIC SURGERY | Age: 55
End: 2020-05-13

## 2020-05-13 DIAGNOSIS — M25.571 CHRONIC PAIN OF RIGHT ANKLE: Primary | ICD-10-CM

## 2020-05-13 DIAGNOSIS — S82.301D CLOSED FRACTURE OF DISTAL END OF RIGHT FIBULA AND TIBIA WITH ROUTINE HEALING, SUBSEQUENT ENCOUNTER: ICD-10-CM

## 2020-05-13 DIAGNOSIS — M21.41 PES PLANUS OF RIGHT FOOT: ICD-10-CM

## 2020-05-13 DIAGNOSIS — S82.841S CLOSED BIMALLEOLAR FRACTURE OF RIGHT ANKLE, SEQUELA: ICD-10-CM

## 2020-05-13 DIAGNOSIS — S82.831D CLOSED FRACTURE OF DISTAL END OF RIGHT FIBULA AND TIBIA WITH ROUTINE HEALING, SUBSEQUENT ENCOUNTER: ICD-10-CM

## 2020-05-13 DIAGNOSIS — G57.91 NEURITIS OF FOOT, RIGHT: ICD-10-CM

## 2020-05-13 DIAGNOSIS — S82.841D CLOSED BIMALLEOLAR FRACTURE OF RIGHT ANKLE WITH ROUTINE HEALING, SUBSEQUENT ENCOUNTER: ICD-10-CM

## 2020-05-13 DIAGNOSIS — S82.831D CLOSED FRACTURE OF DISTAL END OF RIGHT FIBULA WITH ROUTINE HEALING, UNSPECIFIED FRACTURE MORPHOLOGY, SUBSEQUENT ENCOUNTER: ICD-10-CM

## 2020-05-13 DIAGNOSIS — G89.29 CHRONIC PAIN OF RIGHT ANKLE: Primary | ICD-10-CM

## 2020-05-13 NOTE — TELEPHONE ENCOUNTER
Patient called to advise the orthotic we ordered was denied by insurance and she's getting ready to call them for an appeal (Letter dated 5/8/20 form Dr. Anatoly Atwood). It appears they may be asking for her to have physical therapy, however, she had therapy in 2017 and again in 2019 with no success. She will advise us after appeal with insurance, may be requesting order for p/t.

## 2020-05-27 NOTE — TELEPHONE ENCOUNTER
Patient called again for Teodoro Kehr and said she wants Dr. Vijay Chand to know that she wants him to start the appeal with [employer]. Lina. [phone] and [phone]. Patient tel. [phone].

## 2020-05-29 ENCOUNTER — DOCUMENTATION ONLY (OUTPATIENT)
Dept: ORTHOPEDIC SURGERY | Age: 55
End: 2020-05-29

## 2020-05-29 ENCOUNTER — PATIENT MESSAGE (OUTPATIENT)
Dept: ORTHOPEDIC SURGERY | Age: 55
End: 2020-05-29

## 2020-05-29 NOTE — PROGRESS NOTES
A message was sent to the patient letting her know that we do not have a way to enter this information due to the fact that it is not pertinent to our modality and to contact her pcp.

## 2020-05-29 NOTE — TELEPHONE ENCOUNTER
From: Karen Murillo  To: Lc Schaefer MD  Sent: 5/29/2020 9:26 AM EDT  Subject: Update Medical Information    When going over information in [employer] I noticed that several things of interest is missing. I am attaching a copy of my shot record for your review and submission.  Thanks

## 2020-06-01 ENCOUNTER — TELEPHONE (OUTPATIENT)
Dept: ORTHOPEDIC SURGERY | Age: 55
End: 2020-06-01

## 2020-06-01 NOTE — TELEPHONE ENCOUNTER
Patient requesting Dr Irina Zelaya only to contact her regarding an insurance issue with getting her orthotic. States no info has been sent to [employer] regarding her physical therapy or her progress from [address] [place] [address]. Please see all documentation and phone encounters in [facility].     Pt V#[phone]

## 2020-06-03 NOTE — TELEPHONE ENCOUNTER
Spoke to patient and she needs her medical records sent to [facility]. Carrie Brady in medical records is taking care of this.

## 2020-06-10 ENCOUNTER — OFFICE VISIT (OUTPATIENT)
Dept: ORTHOPEDIC SURGERY | Age: 55
End: 2020-06-10

## 2020-06-10 VITALS — WEIGHT: 149.8 LBS | BODY MASS INDEX: 24.96 KG/M2 | TEMPERATURE: 97.1 F | HEIGHT: 65 IN

## 2020-06-10 DIAGNOSIS — M19.171 POST-TRAUMATIC ARTHRITIS OF RIGHT ANKLE: Primary | ICD-10-CM

## 2020-06-10 NOTE — PROGRESS NOTES
AMBULATORY PROGRESS NOTE      Patient: Mario Alberto Kohler             MRN: [id number]     SSN: [id number] Body mass index is 24.93 kg/m². YOB: 1965     AGE: 62 y.o. EX: female    PCP: Ritchie Ferro MD       IMPRESSION //  DIAGNOSIS AND TREATMENT PLAN      DIAGNOSES  1. Post-traumatic arthritis of right ankle        Orders Placed This Encounter    Generic Supply Order     Orthotic revision        She is really not much better, I seen in the past for posttraumatic arthritic changes, across her right ankle. She had surgery, by order orthopedics, several years ago, for quite severe right distal tibia fracture. She had extensive physical therapy, so no that she needs anymore at this point. She still having some stiffness, and discomfort, the anterior portion of her ankle. The pain is about a 7 out of 10 on the subjective pain scale    I can have her see Rolo incision, of [facility], so they can take down the orthotic that she has the metatarsal pad component of the orthotic that he made for her. Much of our discussion, today, was that she mentions that the orthotic that he made for her, which I did visualize, has quite a high arch, and has a profound metatarsal pad. Is no better than the insert that she has that was over-the-counter, non-custom. The orthotic she has, gives her some relief, but I think it can be posted a bit more. This is over-the-counter orthotic. As such, I recommend that h remove the metatarsal pad, and take down some of the medial posting. She really just needs a slightly medially posted orthotic more accommodative. Otherwise no surgical adventures, recommended for her, this current time. Indeed is a difficult diagnosis that she has: Posttraumatic arthritis of the right ankle.       HPI //  Christiano 23 A 62 y.o. female who presents to my outpatient office for evaluation of:  ABOVE ORTHO DIAGNOSIS    This young lady, is well known to me.  She had ankle distal tibia fracture fixation by Dr. Alberto Isaacs, of [facility], several years ago. She unfortunately has gone on to develop posttraumatic osteoarthritis of this right ankle. Despite numerous anti-inflammatories active modification, she still having discomfort, to the right ankle. She is been using a neoprene ankle sleeve and a Lidoderm patch which is helped her and afforded her some relief, but she still having discomfort overall. The combination Lidoderm patch, Lodine anti-inflammatory agent, and the copper-containing ankle sleeve, is helped her marginally. On examination 6/10/2020 , the patient is alert, oriented (name, place, time) and follows commands. she is in no acute distress and her affect and mood are appropriate. Visit Vitals  Temp 97.1 °F (36.2 °C) (Oral)   Ht 5' 5\" (1.651 m)   Wt 149 lb 12.8 oz (67.9 kg)   BMI 24.93 kg/m²         Right ankle examined well-healed surgical scar seen anteriorly, she does have diminished ankle dorsiflexion, more preserved plantarflexion. She has full eversion does not have full inversion I.  Her pulses are intact toes are nice warm and pink. There is no gross instability, of this ankle. No signs of DVT       CHART REVIEW     Patient Active Problem List   Diagnosis Code    Follicle cyst V99.46    Constipation K59.00    Rectocele N81.6    Hemorrhoids K64.9    Fracture of distal end of tibia with fibula S82.309A, S82.839A    Palpitations R00.2    SOB (shortness of breath) R06.02    Dizziness R42        Pablo Diego has been experiencing pain and discomfort confirmed as outlined in the pain assessment outlined below. Pain Assessment  6/10/2020   Location of Pain Ankle   Pain Location Comment -   Location Modifiers Right   Severity of Pain 7   Quality of Pain Throbbing; Sharp;Dull;Burning   Quality of Pain Comment -   Duration of Pain Persistent   Frequency of Pain Constant   Aggravating Factors Walking;Standing;Stairs   Aggravating Factors Comment -   Limiting Behavior -   Relieving Factors Rest   Result of Injury No   Work-Related Injury -   Type of Injury -        Meli LEI Robin Chen  has a past medical history of Colon polyps, Constipation, Heart murmur, Hemorrhoids, Mitral valve prolapse, Nausea & vomiting, Plantar fasciitis, and Vestibular neuritis. Patients is employed at:         Past Medical History:   Diagnosis Date    Colon polyps     Constipation     Heart murmur     Hemorrhoids     Mitral valve prolapse     Nausea & vomiting     \"VIOLENTLY SICK AFTER TUBAL LIGATION\"    Plantar fasciitis     Vestibular neuritis      Current Outpatient Medications   Medication Sig    ondansetron (ZOFRAN ODT) 8 mg disintegrating tablet Take 1 Tab by mouth every eight (8) hours as needed for Nausea or Nausea or Vomiting.  oxybutynin chloride XL (DITROPAN XL) 10 mg CR tablet Take 1 Tab by mouth daily.  lidocaine (LIDODERM) 5 % 1 Patch by TransDERmal route every twenty-four (24) hours. Apply patch to the affected area for 12 hours a day and remove for 12 hours a day.  multivitamin (ONE A DAY) tablet Take 1 Tab by mouth daily.  cholecalciferol, vitamin D3, (VITAMIN D3) 2,000 unit tab Take  by mouth.  lidocaine (LIDODERM) 5 % Apply patch to the affected area for 12 hours a day and remove for 12 hours a day. Cut to fit.  etodolac (LODINE) 400 mg tablet TAKE 1 TABLET BY MOUTH 2 TIMES A DAY WITH MEALS    alendronate (FOSAMAX) 70 mg tablet Take  by mouth.  busPIRone (BUSPAR) 15 mg tablet Take 15 mg by mouth two (2) times a day.  cycloSPORINE (RESTASIS MULTIDOSE) 0.05 % drop ophthalmic drops 1 Drop two (2) times a day.  coenzyme q10 (CO Q-10) 10 mg cap Take  by mouth.  red yeast rice extract 600 mg cap Take 600 mg by mouth now.  calcium-cholecalciferol, d3, (CALCIUM 600 + D) 600-125 mg-unit tab Take  by mouth. No current facility-administered medications for this visit. THE  FOR Gavin Lamb  WAS REVIEWED BY Rachel Pearson MD 6/10/2020 . Allergies   Allergen Reactions    Latex Other (comments)     \"YEAST INFECTION\"    Wasp [Venom-Wasp] Anaphylaxis    Egg Swelling     Thick phlegm     Amoxicillin Other (comments)     Gave pt bad ha's    Flagyl [Metronidazole] Other (comments)     Pt states headaches and vomiting      Urised [Meth-Hyos-Atrp-M Blue-Ba-Phsal] Itching and Swelling     Past Surgical History:   Procedure Laterality Date    COLONOSCOPY N/A 10/12/2018    COLONOSCOPY performed by Queenie Fiore MD at [facility] HX BREAST LUMPECTOMY      right    HX COLONOSCOPY      2010,2013    HX CYST REMOVAL  2010    right breast    HX GYN      hysterectomy    HX HEENT      tonselectomy    HX HEENT      lasik eye surgery    HX OTHER SURGICAL      cystoscopy    HX OTHER SURGICAL  2010    right ovary removed    HX TUBAL LIGATION       Social History     Occupational History    Not on file   Tobacco Use    Smoking status: Never Smoker    Smokeless tobacco: Never Used   Substance and Sexual Activity    Alcohol use: Yes     Comment: OCCASIONALLY    Drug use: No    Sexual activity: Not on file     Family History   Problem Relation Age of Onset    Cancer Sister     Cancer Father         colon    Cancer Maternal Grandmother     Hypertension Mother     Cancer Mother     Colon Cancer Paternal Aunt     Cancer Paternal Uncle         prostate        DIAGNOSTIC IMAGING  LAB DATA      No results found for: HBA1C, HGBE8, FCI6HIZO // No results found for: GLU, GLUCPOC     No results found for: RVJ0BTCM, LJN8GMLN      No results found for: [place], XQVID3, XQVID, VD3RIA, ATXO33PVNSE      REVIEW OF SYSTEMS : 6/10/2020  ALL BELOW ARE Negative except : SEE HPI     Constitutional: Negative for fever, chills and weight loss. Neg Weight Loss  Cardiovascular: Negative for chest pain, claudication and leg swelling.  SOB, PORTER Gastrointestinal/Urological: Negative for pain, N/V/D/C, Blood in stool or urine,dysuria, Hematuria, Incontinence  Musculoskeletal: see HPI. Neurological: Negative for dizziness and weakness, headaches,Visual Changes or             Confusion, or Seizures,   Psychiatric/Behavioral: Negative for depression, memory loss and substance abuse. Extremities:  Negative for hair changes, rash or skin lesion changes. Hematologic: Negative for Bleeding problems, bruising, pallor or swollen lymph nodes. Peripheral Vascular: No calf pain, vascular vein tenderness to calf pain// No calf throbbing, posterior knee throbbing pain     DIAGNOSTIC IMAGING      Please see above section of this report. I have personally reviewed the results of the above study. The interpretation of this study is my professional opinion.       Albino Huffman MD  6/10/2020  5:06 PM

## 2020-06-10 NOTE — PROGRESS NOTES
1. Have you been to the ER, urgent care clinic since your last visit? Hospitalized since your last visit? No    2. Have you seen or consulted any other health care providers outside of the [address] [place] [address] since your last visit? Include any pap smears or colon screening.  No

## 2020-07-14 ENCOUNTER — HOSPITAL ENCOUNTER (OUTPATIENT)
Dept: PHYSICAL THERAPY | Age: 55
Discharge: HOME | End: 2020-07-14
Payer: MEDICAID

## 2020-07-14 PROCEDURE — 97162 PT EVAL MOD COMPLEX 30 MIN: CPT | Performed by: PHYSICAL THERAPIST

## 2020-07-14 PROCEDURE — 97535 SELF CARE MNGMENT TRAINING: CPT | Performed by: PHYSICAL THERAPIST

## 2020-07-14 PROCEDURE — 97112 NEUROMUSCULAR REEDUCATION: CPT | Performed by: PHYSICAL THERAPIST

## 2020-07-14 NOTE — PROGRESS NOTES
[facility]  [address] [facility]  [place], [address] [facility]  [phone] fax    Plan of Care/ Statement of Necessity for Physical Therapy Services    Patient name: Joel Jerry Start of Care: 2020   Referral source: Miraim Prieto* : 1965    Medical Diagnosis: Unsteadiness on feet [R26.81]  Payor: Praveen Arnold / Plan: [address] [place] [address] / Product Type: Managed Care Medicaid /  Onset Date:Chronic since  with Flare up on 2020    Treatment Diagnosis: Dizziness   Prior Hospitalization: see medical history Provider#: [id number]   Medications: Verified on Patient summary List    Comorbidities: incontinence, osteoporosis   Prior Level of Function: Prior to  (I) and unrestricted; following  episode and treatment, constant low grade dizziness w/ occasional flare-ups that were self managed at home     The Plan of Care and following information is based on the information from the initial evaluation. Assessment/ key information: Pt is a 61 y/o female w/ c/o Dizziness with ;ooking up and laying on her left side in bed. Improves w/ meclizine. With the most recent flare up (around 2020) she woke up on morning and upon sitting up in bed became dizzy. Pt presents with limited tolerance to VOR x1 and x2 horiz and vertically, limited convergence tolerance, and decreased smooth pursuit ability B. Pt will benefit from skilled PT to address the deficits and progress with pt goals. Evaluation Complexity History MEDIUM  Complexity : 1-2 comorbidities / personal factors will impact the outcome/ POC ; Examination MEDIUM Complexity : 3 Standardized tests and measures addressing body structure, function, activity limitation and / or participation in recreation  ;Presentation MEDIUM Complexity : Evolving with changing characteristics  ; Clinical Decision Making MEDIUM Complexity : FOTO score of 26-74  Overall Complexity Rating: MEDIUM  Problem List: pain affecting function, impaired gait/ balance, decrease ADL/ functional abilitiies and decrease activity tolerance   Treatment Plan may include any combination of the following: Therapeutic exercise, Therapeutic activities, Neuromuscular re-education, Physical agent/modality, Manual therapy, Patient education and Functional mobility training  Patient / Family readiness to learn indicated by: asking questions, trying to perform skills and interest  Persons(s) to be included in education: patient (P)  Barriers to Learning/Limitations: None  Patient Goal (s): 100% recovery  Patient Self Reported Health Status: fair  Rehabilitation Potential: fair    Short Term Goals: To be accomplished in 1 weeks:  1. Pt will be (I) and complaint w/ HEp to progress gains in PT. Long Term Goals: To be accomplished in 5 weeks:  1. Pt will improve FOTO to > or = to 79 to demo improved function. 2. Pt will improve smooth pursuits to negative for improved dizziness w/turning her head. 3. Pt will improve VOR x2 vertically to negative for improved tolerance to driving. 4. Pt will report > or = to 75% overall improvement in symptoms for ease w/ return to minimal difficulty walking several blocks. Frequency / Duration: Patient to be seen 2 times per week for 5 weeks. Patient/ Caregiver education and instruction: Diagnosis, prognosis, activity modification and exercises   [x]  Plan of care has been reviewed with PRABHJOT Issa DPT 7/14/2020 4:28 PM    ________________________________________________________________________    I certify that the above Therapy Services are being furnished while the patient is under my care. I agree with the treatment plan and certify that this therapy is necessary.     [de-identified] Signature:____________Date:_________TIME:________    Oneta Pry, Date and Time must be completed for valid certification **    Please sign and return to [facility]  [address]  [address]  [phone] fax

## 2020-07-14 NOTE — PROGRESS NOTES
PT DAILY TREATMENT NOTE/VESTIBULAR EVAL 10-18    Patient Name: Ramy Davis  Date:2020  : 1965  [x]  Patient  Verified  Payor: [employer] / Plan: [facility] [employer] / Product Type: Managed Care Medicaid /    In time:1236  Out time:115  Total Treatment Time (min): 39  Visit #: 1 of 10    Treatment Area: Unsteadiness on feet [R26.81]    SUBJECTIVE  Pain Level (0-10 scale): 0/10 sitting  [x]constant [x]intermittent []improving []worsening []no change since onset    Any medication changes, allergies to medications, adverse drug reactions, diagnosis change, or new procedure performed?: [x] No    [] Yes (see summary sheet for update)  Subjective functional status/changes:     PLOF:  Prior to  (I) and unrestricted; following  episode and treatment, constant low grade dizziness w/ occasional flare-ups that were self managed at home  Limitations to PLOF: constant dizziness that is 8years old  Mechanism of Injury: Pt reports she dropped a bar of soap and bent forward to pick it up, when she stood back up the whole room was spinning. With the most recent flare up (around 2020) she woke up on morning and upon sitting up in bed became dizzy. Current symptoms/Complaints: Dizziness with ;ooking up and laying on her left side in bed. Improves w/ meclizine   Previous Treatment/Compliance: Pt reports she has had a number of different episode of PT to assist w/ dizziness w/ the most success being after the first round in . Reports numerous tests and treatments that cumulatively got her back to only 28% loss of inner ear balance (reports 25% is considered normal). States that has been her baseline for 10 years.    PMHx/Surgical Hx: none reported  Work Hx: not working, has disability for this condition  Living Situation: lives alone  Pt Goals: 100% recovery  Barriers: []pain []financial []time []transportation [x]other: constant dizziness that has lasted 10 years  Motivation: good    OBJECTIVE/EXAMINATION    10 min [x]Eval                  []Re-Eval     10 min Neuromuscular Re-education:  []  See flow sheet : instructed in and demo'd HEP, performed 2 sets each   Rationale: improve coordination, improve balance and increase proprioception  to improve the patients ability to improve dizziness    19 min Self Care Home mgmt: educated in [address] [place] [address] at home, mechanics of inner ear and dizziness, purpose of adaption ex's   Rationale: improve coordination, dizziness, and compliance to improve dizziness and activity tolerance           With   [] TE   [] TA   [x] neuro   [] other: Patient Education: [x] Review HEP    [] Progressed/Changed HEP based on:   [] positioning   [] body mechanics   [] transfers   [] heat/ice application    [] other:      Physical Therapy Evaluation - Vestibular    Posture:  [x] WNL      [] Forward head    [] Protracted shoulders    [] Retracted shoulders  [] Kyphosis:  [] increased   [] decreased   [] Lordosis:   [] increased   [] decreased  Other:    C/S ROM: [x] WFL    [] Limited    Describe:    Strength: [x] WFL    [] Limited    Describe:    Optional Tests:  Oculomotor Tests: (Fixation Not Blocked)       Ocular ROM:   [x] WFL    [] Limited    Describe:       Spontaneous Nystag. [x] Neg     [] Pos    [] Left    [] Right       Gaze Holding Nystag.  [x] Neg     [] Pos    [] Left    [] Right        Smooth Pursuit  [] Neg     [x] Pos    [x] Left    [x] Right        Saccades   [] Neg     [] Pos    [] Left    [] Right        VOR - Slow Head Mvmt [] Neg     [x] Pos    [x] Left    [x] Right        VOR - Fast Head Mvmt [] Neg     [x] Pos    [x] Left    [x] Right          Other Special Tests:       Vertebral Artery Testing [] Neg     [] Pos    [] Left    [] Right       Hallpike-Osage Maneuver [x] Neg     [] Pos    [x] Left    [x] Right       Roll Test   [x] Neg     [] Pos    [x] Left    [x] Right     Balance Standard Testing : NT    Motion Sensitivity:  [x] Neg     [] Pos Describe: Other tests: convergence to 4\" w/ decreased focus and left eye fasciculations     HEP:  Access Code: [email or web address]   URL: [email or web address]   Date: 07/14/2020   Prepared by: Doug Armenta     Exercises   Seated VOR Cancellation - 20 reps - 1 sets - 2-3x daily - 7x weekly   Pencil Pushups - 10 reps - 1 sets - 5 seconds close to your nose hold - 3x daily - 7x weekly       Pain Level (0-10 scale) post treatment: 0/10    ASSESSMENT/Changes in Function: Pt presents with limited tolerance to VOR x1 and x2 horiz and vertically, limited convergence tolerance, and decreased smooth pursuit ability B. Patient will continue to benefit from skilled PT services to modify and progress therapeutic interventions, analyze and cue movement patterns, address imbalance/dizziness and instruct in home and community integration to attain remaining goals. [x]  See Plan of Care  []  See progress note/recertification  []  See Discharge Summary         Progress towards goals / Updated goals:  Short Term Goals: To be accomplished in 1 weeks:  1. Pt will be (I) and complaint w/ HEp to progress gains in PT. At eval: issued HEP  Long Term Goals: To be accomplished in 5 weeks:  1. Pt will improve FOTO to > or = to 79 to demo improved function. At eval: FOTO = 54  2. Pt will improve smooth pursuits to negative for improved dizziness w/turning her head. At eval: (+) smooth pursuits and increased dizziness reported  3. Pt will improve VOR x2 vertically to negative for improved tolerance to driving. At eval: (+) VOR x2 vertically  4. Pt will report > or = to 75% overall improvement in symptoms for ease w/ return to minimal difficulty walking several blocks.    At eval: 0%    PLAN  [x]  Upgrade activities as tolerated     []  Continue plan of care  []  Update interventions per flow sheet       []  Discharge due to:_  []  Other:_      Isaiah Sidhu DPT 7/14/2020  1:11 PM

## 2020-07-16 DIAGNOSIS — S82.841S CLOSED BIMALLEOLAR FRACTURE OF RIGHT ANKLE, SEQUELA: ICD-10-CM

## 2020-07-16 DIAGNOSIS — M25.571 ACUTE RIGHT ANKLE PAIN: ICD-10-CM

## 2020-07-16 RX ORDER — ETODOLAC 400 MG/1
TABLET, FILM COATED ORAL
Qty: 60 TAB | Refills: 0 | Status: SHIPPED | OUTPATIENT
Start: 2020-07-16 | End: 2020-09-02

## 2020-07-17 ENCOUNTER — HOSPITAL ENCOUNTER (OUTPATIENT)
Dept: PHYSICAL THERAPY | Age: 55
Discharge: HOME | End: 2020-07-17
Payer: MEDICAID

## 2020-07-17 PROCEDURE — 97112 NEUROMUSCULAR REEDUCATION: CPT | Performed by: PHYSICAL THERAPIST

## 2020-07-17 NOTE — PROGRESS NOTES
PT DAILY TREATMENT NOTE 10-18    Patient Name: Louise Duran  Date:2020  : 1965  [x]  Patient  Verified  Payor: Wendie Mayen / Plan: [address] [place] [address] / Product Type: Managed Care Medicaid /    In time:207  Out time:248  Total Treatment Time (min): 41  Visit #: 2 of 10    Treatment Area: Unsteadiness on feet [R26.81]    SUBJECTIVE  Pain Level (0-10 scale): 0/10 sitting  Any medication changes, allergies to medications, adverse drug reactions, diagnosis change, or new procedure performed?: [x] No    [] Yes (see summary sheet for update)  Subjective functional status/changes:   [] No changes reported  Reports she feels about the same and has been doing her ex's once a day    OBJECTIVE     41 min Neuromuscular Re-education:  [x]  See flow sheet : initiated balance and vestibular ex's per flow sheet   Rationale: improve coordination, improve balance and increase proprioception  to improve the patients ability to decrease dizziness and improve activity tolerance          With   [] TE   [] TA   [] neuro   [] other: Patient Education: [x] Review HEP    [] Progressed/Changed HEP based on:   [] positioning   [] body mechanics   [] transfers   [] heat/ice application    [] other:      Other Objective/Functional Measures:      Pain Level (0-10 scale) post treatment: 0/10 sitting    ASSESSMENT/Changes in Function: Initiated POC per flow sheet. Pt required numerous hand touch assists w/ tandem SP ex's. Reported elevated \"woosieness\" w/ all vestibular ex's that alleviated w/in 1-2 seconds. Pt reported she was \"just about finished\" after completing all ex's. Instructed pt to report tolerance to ex's NV. Patient will continue to benefit from skilled PT services to modify and progress therapeutic interventions, analyze and cue movement patterns, address imbalance/dizziness and instruct in home and community integration to attain remaining goals.      []  See Plan of Care  []  See progress note/recertification  []  See Discharge Summary         Progress towards goals / Updated goals:  Short Term Goals: To be accomplished in 1 weeks:  1. Pt will be (I) and complaint w/ HEp to progress gains in PT. At eval: issued HEP  Current: progressing, pt reports completing each exercise once a day 7/17/2020  Long Term Goals: To be accomplished in 5 weeks:  1. Pt will improve FOTO to > or = to 79 to demo improved function. At eval: FOTO = 54  2. Pt will improve smooth pursuits to negative for improved dizziness w/turning her head. At eval: (+) smooth pursuits and increased dizziness reported  3. Pt will improve VOR x2 vertically to negative for improved tolerance to driving. At eval: (+) VOR x2 vertically  4. Pt will report > or = to 75% overall improvement in symptoms for ease w/ return to minimal difficulty walking several blocks.    At eval: 0%    PLAN  [x]  Upgrade activities as tolerated     []  Continue plan of care  []  Update interventions per flow sheet       []  Discharge due to:_  []  Other:_      Diego Sotomayor DPT 7/17/2020  2:06 PM    Future Appointments   Date Time Provider Monique Bullard   7/22/2020  2:45 PM Vicky Doll, PT [facility]   7/24/2020  3:30 PM Vicky Doll, PT [facility]   7/29/2020  2:45 PM Vicky Doll, PT [facility]   11/2/2020 10:30 AM Melchor Yang MD [facility]

## 2020-07-22 ENCOUNTER — HOSPITAL ENCOUNTER (OUTPATIENT)
Dept: PHYSICAL THERAPY | Age: 55
Discharge: HOME | End: 2020-07-22
Payer: MEDICAID

## 2020-07-22 ENCOUNTER — APPOINTMENT (OUTPATIENT)
Dept: PHYSICAL THERAPY | Age: 55
End: 2020-07-22
Payer: MEDICAID

## 2020-07-22 PROCEDURE — 97112 NEUROMUSCULAR REEDUCATION: CPT

## 2020-07-22 NOTE — PROGRESS NOTES
PT DAILY TREATMENT NOTE 10-18    Patient Name: Manjeet Bring  Date:2020  : 1965  [x]  Patient  Verified  Payor: Mary Carmen Agrawal / Plan: [address] [place] [address] / Product Type: Managed Care Medicaid /    In time:3:05  Out time:3:36  Total Treatment Time (min): 31  Visit #: 3 of 10    Medicare/BCBS Only   Total Timed Codes (min):    1:1 Treatment Time:          Treatment Area: Unsteadiness on feet [R26.81]    SUBJECTIVE  Pain Level (0-10 scale): 0  Any medication changes, allergies to medications, adverse drug reactions, diagnosis change, or new procedure performed?: [x] No    [] Yes (see summary sheet for update)  Subjective functional status/changes:   [] No changes reported  No pain just always dizzy. OBJECTIVE      31 min Neuromuscular Re-education:  []  See flow sheet :   Rationale: improve balance, increase proprioception and Limit dizziness  to improve the patients ability to limit dizziness throughout the day    With   [] TE   [] TA   [x] neuro   [] other: Patient Education: [x] Review HEP    [] Progressed/Changed HEP based on:   [] positioning   [] body mechanics   [] transfers   [] heat/ice application    [] other:      Other Objective/Functional Measures:   - Good exercise participation     Pain Level (0-10 scale) post treatment: 0    ASSESSMENT/Changes in Function:   - Decreased dizziness reported after treatment  - Limited Tandem stance  with smooth pursuits 10x  due to difficulty  - Add romberg with smooth pursuit with good tolerance    Patient will continue to benefit from skilled PT services to modify and progress therapeutic interventions, address ROM deficits, analyze and cue movement patterns and address imbalance/dizziness to attain remaining goals. []  See Plan of Care  []  See progress note/recertification  []  See Discharge Summary         Progress towards goals / Updated goals:  Short Term Goals: To be accomplished in 1 weeks:  1.  Pt will be (I) and complaint w/ HEp to progress gains in PT.   At eval: issued HEP  Current: progressing, pt reports completing each exercise once a day 7/17/2020  Long Term Goals: To be accomplished in 5 weeks:  1. Pt will improve FOTO to > or = to 79 to demo improved function.   At eval: FOTO = 54  2. Pt will improve smooth pursuits to negative for improved dizziness w/turning her head.   At eval: (+) smooth pursuits and increased dizziness reported   Current:  Progressing with no increased dizziness after performing smooth pursuit 7/22/20  3. Pt will improve VOR x2 vertically to negative for improved tolerance to driving.   At eval: (+) VOR x2 vertically  4.  Pt will report > or = to 75% overall improvement in symptoms for ease w/ return to minimal difficulty walking several blocks.   At eval: 0%    PLAN  [x]  Upgrade activities as tolerated     [x]  Continue plan of care  []  Update interventions per flow sheet       []  Discharge due to:_  []  Other:_      Dominique Harkins, PT 7/22/2020  8:29 AM    Future Appointments   Date Time Provider Monique Bullard   7/22/2020  2:45 PM Haider Ahr, PT [place] [id number]   7/24/2020  3:30 PM Haider Ahr, PT [place] [id number] [address]   7/29/2020  2:45 PM Haider Ahr, PT [place] [id number]   11/2/2020 10:30 AM Claudia Rojas MD [facility]

## 2020-07-24 ENCOUNTER — HOSPITAL ENCOUNTER (OUTPATIENT)
Dept: PHYSICAL THERAPY | Age: 55
Discharge: HOME | End: 2020-07-24
Payer: MEDICAID

## 2020-07-24 PROCEDURE — 97112 NEUROMUSCULAR REEDUCATION: CPT

## 2020-07-24 NOTE — PROGRESS NOTES
PT DAILY TREATMENT NOTE 10-18    Patient Name: Shane Layne  Date:2020  : 1965  [x]  Patient  Verified  Payor: Sandra Mendez / Plan: [address] [place] [address] / Product Type: Managed Care Medicaid /    In time:3:38  Out time:4:16  Total Treatment Time (min):38  Visit #: 4 of 10    Medicare/BCBS Only   Total Timed Codes (min):    1:1 Treatment Time:          Treatment Area: Unsteadiness on feet [R26.81]    SUBJECTIVE  Pain Level (0-10 scale): 0  Any medication changes, allergies to medications, adverse drug reactions, diagnosis change, or new procedure performed?: [x] No    [] Yes (see summary sheet for update)  Subjective functional status/changes:   [] No changes reported  The dizziness is not bad and when it comes on harder it only stays for 5 seconds or so. More dizziness with shaking head \"No\"    OBJECTIVE      38 min Neuromuscular Re-education:  []  See flow sheet :   Rationale: increase ROM, increase strength and improve coordination  to improve the patients ability to limit diziness          With   [] TE   [] TA   [x] neuro   [] other: Patient Education: [x] Review HEP    [] Progressed/Changed HEP based on:   [] positioning   [] body mechanics   [] transfers   [] heat/ice application    [] other:      Other Objective/Functional Measures:   - LOB with performing Tandem smooth pursuit at 50% of 30\"     Pain Level (0-10 scale) post treatment: 0    ASSESSMENT/Changes in Function:   - Good response to added exercises  - Minor dizziness after each exercise that last a few seconds  - Decreased LOB with Tandem smooth pursuit to 25% at end of treatment    Patient will continue to benefit from skilled PT services to modify and progress therapeutic interventions, address functional mobility deficits and analyze and cue movement patterns to attain remaining goals.      []  See Plan of Care  []  See progress note/recertification  []  See Discharge Summary         Progress towards goals / Updated goals:  Short Term Goals: To be accomplished in 1 weeks:  1. Pt will be (I) and complaint w/ HEp to progress gains in PT.   At eval: issued HEP  Current: progressing, pt reports completing each exercise once a day 7/17/2020  Long Term Goals: To be accomplished in 5 weeks:  1. Pt will improve FOTO to > or = to 79 to demo improved function.   At eval: FOTO = 54  2. Pt will improve smooth pursuits to negative for improved dizziness w/turning her head.   At eval: (+) smooth pursuits and increased dizziness reported              Current:  Progressing with no increased dizziness after performing smooth pursuit 7/22/20  3. Pt will improve VOR x2 vertically to negative for improved tolerance to driving.   At eval: (+) VOR x2 vertically   Current: Pt reports improved tolerance today but dizziness still remains 7/24/20  4.  Pt will report > or = to 75% overall improvement in symptoms for ease w/ return to minimal difficulty walking several blocks.   At eval: 0%    PLAN  [x]  Upgrade activities as tolerated     [x]  Continue plan of care  []  Update interventions per flow sheet       []  Discharge due to:_  []  Other:_      Selvin Parry, PT 7/24/2020  5:16 PM    Future Appointments   Date Time Provider Monique Bullard   7/27/2020 11:00 AM Haseeb Melissa, PT [facility]   7/29/2020  2:45 PM Haseeb Melissa, PT [facility]   11/2/2020 10:30 AM MD Yudelka Santos

## 2020-07-27 ENCOUNTER — HOSPITAL ENCOUNTER (OUTPATIENT)
Dept: PHYSICAL THERAPY | Age: 55
Discharge: HOME | End: 2020-07-27
Payer: MEDICAID

## 2020-07-27 PROCEDURE — 97112 NEUROMUSCULAR REEDUCATION: CPT

## 2020-07-27 NOTE — PROGRESS NOTES
PT DAILY TREATMENT NOTE 10-18    Patient Name: Natanael Gibbs  Date:2020  : 1965  [x]  Patient  Verified  Payor: Alvarado Webb / Plan: [address] / Product Type: Managed Care Medicaid /    In time:1103  Out time:1146  Total Treatment Time (min): 43  Visit #: 5 of 10    Medicare/BCBS Only   Total Timed Codes (min):    1:1 Treatment Time:          Treatment Area: Unsteadiness on feet [R26.81]    SUBJECTIVE  Pain Level (0-10 scale): 0  Any medication changes, allergies to medications, adverse drug reactions, diagnosis change, or new procedure performed?: [x] No    [] Yes (see summary sheet for update)  Subjective functional status/changes:   [] No changes reported  Only did the exercises one time. OBJECTIVE        43 min Neuromuscular Re-education:  []  See flow sheet :   Rationale: improve coordination, improve balance and increase proprioception  to improve the patients ability to limit dizziness      With   [x] TE   [] TA   [] neuro   [] other: Patient Education: [x] Review HEP    [] Progressed/Changed HEP based on:   [] positioning   [] body mechanics   [] transfers   [] heat/ice application    [] other:      Other Objective/Functional Measures:   - Good tolerance with added exercises per flow sheet     Pain Level (0-10 scale) post treatment: 0    ASSESSMENT/Changes in Function:   - Good tolerance with added gaze training   - Pt with less dizziness noted during exercises today    Patient will continue to benefit from skilled PT services to modify and progress therapeutic interventions, address functional mobility deficits, analyze and cue movement patterns, analyze and modify body mechanics/ergonomics and address imbalance/dizziness to attain remaining goals. []  See Plan of Care  []  See progress note/recertification  []  See Discharge Summary         Progress towards goals / Updated goals:  Short Term Goals: To be accomplished in 1 weeks:  1.  Pt will be (I) and complaint w/ HEp to progress gains in PT.   At eval: issued HEP  Current: progressing, pt reports completing each exercise once a day 7/17/2020  Long Term Goals: To be accomplished in 5 weeks:  1. Pt will improve FOTO to > or = to 79 to demo improved function.   At eval: FOTO = 54  2. Pt will improve smooth pursuits to negative for improved dizziness w/turning her head.   At eval: (+) smooth pursuits and increased dizziness reported              Current:  Progressing with no increased dizziness after performing smooth pursuit 7/22/20  3. Pt will improve VOR x2 vertically to negative for improved tolerance to driving.   At eval: (+) VOR x2 vertically              Current: Pt reports improved tolerance today but dizziness still remains 7/24/20  4.  Pt will report > or = to 75% overall improvement in symptoms for ease w/ return to minimal difficulty walking several blocks.   At eval: 0%    PLAN  [x]  Upgrade activities as tolerated     [x]  Continue plan of care  []  Update interventions per flow sheet       []  Discharge due to:_  []  Other:_      Oralia Seats, PT 7/27/2020  8:50 AM    Future Appointments   Date Time Provider Monique Bullard   7/27/2020 11:00 AM Peter Young, PT [facility]   7/29/2020  2:45 PM Peter Young PT [facility]   11/2/2020 10:30 AM Evangelina Langley MD [facility]

## 2020-07-29 ENCOUNTER — APPOINTMENT (OUTPATIENT)
Dept: PHYSICAL THERAPY | Age: 55
End: 2020-07-29
Payer: MEDICAID

## 2020-07-29 ENCOUNTER — HOSPITAL ENCOUNTER (OUTPATIENT)
Dept: PHYSICAL THERAPY | Age: 55
Discharge: HOME | End: 2020-07-29
Payer: MEDICAID

## 2020-07-29 PROCEDURE — 97530 THERAPEUTIC ACTIVITIES: CPT

## 2020-07-29 PROCEDURE — 97112 NEUROMUSCULAR REEDUCATION: CPT

## 2020-07-29 PROCEDURE — 97110 THERAPEUTIC EXERCISES: CPT

## 2020-07-29 NOTE — PROGRESS NOTES
PT DISCHARGE DAILY NOTE AND [id number]    Patient name: Odalys Carl Start of Care: 2020   Referral source: Yolanda Erazo* : 1965                Medical Diagnosis: Unsteadiness on feet [R26.81]  Payor: Davian Herbert / Plan: [address] [place] [address] 60 / Product Type: Managed Care Medicaid /  Onset Date:Chronic since  with Flare up on 2020                Treatment Diagnosis: Dizziness   Prior Hospitalization: see medical history Provider#: [id number]   Medications: Verified on Patient summary List    Comorbidities: incontinence, osteoporosis   Prior Level of Function: Prior to  (I) and unrestricted; following  episode and treatment, constant low grade dizziness w/ occasional flare-ups that were self managed at home    Visits from Start of Care: 6    Missed Visits: 0    Reporting Period : 20 to 20    Date:2020  : 1965  [x]  Patient  Verified  Payor: [employer] / Plan: [address] / Product Type: Managed Care Medicaid /    In time:2:50  Out time:3:38  Total Treatment Time (min): 48  Visit #: 6 of 10    Medicare/BCBS Only   Total Timed Codes (min):    1:1 Treatment Time:          SUBJECTIVE  Pain Level (0-10 scale): 0  Any medication changes, allergies to medications, adverse drug reactions, diagnosis change, or new procedure performed?: [x] No    [] Yes (see summary sheet for update)  Subjective functional status/changes:   [] No changes reported  Ready for DC. Really no change but still performing the exercises. Ready to DC,going out of town for a family reunion and will be gone for a while.     OBJECTIVE      10 min Therapeutic Exercise:  [] See flow sheet : Develop and instruct HEP   Rationale: improve balance and increase proprioception to improve the patients ability to improve dizziness    10 min Therapeutic Activity:  []  See flow sheet : Assess for DC   Rationale: Assess for FOTO  to improve the patients ability to return to usual activity with less dizziness     28 min Neuromuscular Re-education:  []  See flow sheet :   Rationale: improve balance and increase proprioception  to improve the patients ability to improve function of daily activity      With   [x] TE   [x] TA   [x] neuro   [] other: Patient Education: [x] Review HEP    [] Progressed/Changed HEP based on:   [] positioning   [] body mechanics   [] transfers   [] heat/ice application    [] other:      Other Objective/Functional Measures:   - Pt instructed on and provided with HEP  - Improved smooth pursuit, negative symptoms with habituation    Access Code: [email or web address]   URL: [email or web address]   Date: 07/29/2020   Prepared by: Mable Pritchard     Exercises   Seated VOR Cancellation - 10 reps - 3 sets - 1x daily - 7x weekly   Seated Gaze Stabilization with Head Rotation - 10 reps - 3 sets - 1x daily - 7x weekly   Seated Horizontal Smooth Pursuit - 10 reps - 3 sets - 1x daily - 7x weekly   Standing VOR Cancellation - 10 reps - 3 sets - 1x daily - 7x weekly   Standing Gaze Stabilization with Head Rotation - 10 reps - 3 sets - 1x daily - 7x weekly   Narrow Stance Gaze Stabilization with Two Near Targets and Head Rotation - 10 reps - 3 sets - 1x daily - 7x weekly   Romberg Stance with Eyes Closed - 10 reps - 3 sets - 1x daily - 7x weekly   Single Leg Stance - 10 reps - 3 sets - 1x daily - 7x weekly   Tandem Stance - 10 reps - 3 sets - 1x daily - 7x weekly   Walking Gaze Stabilization Head Rotation - 10 reps - 3 sets - 1x daily - 7x weekly   Walking Gaze Stabilization Head Nod - 10 reps - 3 sets - 1x daily - 7x weekly   Standing Diagonal Head Nod Vestibular Habituation - 10 reps - 3 sets - 1x daily - 7x weekly   Standing with Head Nod - 10 reps - 3 sets - 1x daily - 7x weekly   Standing with Head Rotation - 10 reps - 3 sets - 1x daily - 7x weekly     Pain Level (0-10 scale) post treatment: 0    Summary of Care:  Progress towards goals / Updated goals:  Short Term Goals: To be accomplished in 1 weeks:  1. Pt will be (I) and complaint w/ HEp to progress gains in PT.   At eval: issued HEP   Current: Met, progressing, pt reports completing each exercise once a day 7/29/2020    Long Term Goals: To be accomplished in 5 weeks:  1. Pt will improve FOTO to > or = to 79 to demo improved function.   At eval: FOTO = 54   Current:  Not met, Progressing at 64 7/29/20  2. Pt will improve smooth pursuits to negative for improved dizziness w/turning her head.   At eval: (+) smooth pursuits and increased dizziness reported              Current:  Met with a neg smooth pursuit 7/29/20  3. Pt will improve VOR x2 vertically to negative for improved tolerance to driving.   At eval: (+) VOR x2 vertically              Current: Not Met, Continued (+) VOR x1 vertically 7/29/20  4. Pt will report > or = to 75% overall improvement in symptoms for ease w/ return to minimal difficulty walking several blocks.   At eval: 0%   Current:  Pt reports no difference in symptoms    ASSESSMENT/Changes in Function: Patient has shown fair progress with this treatment program. Pain as of last visit was 0/10. Patient has shown the ability to perform horizontal smooth pursuit with no exacerbation of symptoms. Patient reports 0% improvement with overall involvement but objective findings show improvement in smooth pursuit but no change in VOR habituation. FOTO score improved to 64. All STG/LTGs achieved as identified above.     Fall Risk Assessment: Patient demonstrates a low Fall Risk     Thank you for this referral!      PLAN  [x]Discontinue therapy: []Patient has reached or is progressing toward set goals      [x]Patient has abdicated      []Due to lack of appreciable progress towards set goals    Nas Rocha, PT 7/29/2020  3:07 PM

## 2020-07-29 NOTE — PROGRESS NOTES
Physical Therapy Discharge Instructions      [facility] at [address] [facility]  Phone: [phone]      Fax:  [phone]    Patient: Maya Issa  : 1965      Continue Home Exercise Program daily      Continue with    [] Ice  as needed    [] Heat           Follow up with MD:     [] Upon completion of therapy     [x] As needed      Recommendations:     [x]   Return to activity with home program    []   Return to activity with the following modifications:       []Post Rehab Program    []Join Independent aquatic program     []Return to/join local gym        Additional Comments:  Thank you for your patronage       David Bowden 2020 3:38 PM

## 2020-07-31 ENCOUNTER — APPOINTMENT (OUTPATIENT)
Dept: PHYSICAL THERAPY | Age: 55
End: 2020-07-31
Payer: MEDICAID

## 2020-08-05 ENCOUNTER — APPOINTMENT (OUTPATIENT)
Dept: PHYSICAL THERAPY | Age: 55
End: 2020-08-05

## 2020-08-07 ENCOUNTER — APPOINTMENT (OUTPATIENT)
Dept: PHYSICAL THERAPY | Age: 55
End: 2020-08-07

## 2020-08-26 ENCOUNTER — OFFICE VISIT (OUTPATIENT)
Dept: ORTHOPEDIC SURGERY | Age: 55
End: 2020-08-26

## 2020-08-26 VITALS
SYSTOLIC BLOOD PRESSURE: 115 MMHG | TEMPERATURE: 98 F | WEIGHT: 145.8 LBS | HEART RATE: 75 BPM | DIASTOLIC BLOOD PRESSURE: 72 MMHG | RESPIRATION RATE: 15 BRPM | OXYGEN SATURATION: 98 % | BODY MASS INDEX: 24.29 KG/M2 | HEIGHT: 65 IN

## 2020-08-26 DIAGNOSIS — G57.91 NEURITIS OF FOOT, RIGHT: ICD-10-CM

## 2020-08-26 RX ORDER — LIDOCAINE 50 MG/G
1 PATCH TOPICAL EVERY 24 HOURS
Qty: 1 PACKAGE | Refills: 0 | Status: SHIPPED | OUTPATIENT
Start: 2020-08-26 | End: 2021-07-30

## 2020-08-26 NOTE — PATIENT INSTRUCTIONS
You have been provided with an order for durable medical equipment that you may  at an outside facility as our office does not carry the equipment you need. You may pick it up at any medical supply company you like. Listed below are a few different locations for your convenience: 
 
[address] [place] [address],[address] [facility], [address] [place], [address] [address] Phone: [phone] Modification to right orthotic: Please add more medial posting to minimize lateral ankle impingement.

## 2020-08-26 NOTE — LETTER
NOTIFICATION RETURN TO WORK / SCHOOL 
 
8/26/2020 12:03 PM 
 
Ms. Giron [facility] [id number] To Whom It May Concern: 
 
Hilario Dockery is currently under the care of [address] [place] [address] Mayito Turner. Please allow Ms. Kent to wear athletic shoes 100% of the time while on the job. No prolonged standing and walking for more than 15 minutes at a time. No heavy lifting over 20 pounds. If there are questions or concerns please have the patient contact our office.  
 
 
 
Sincerely, 
 
 
Bia Lindsay MD

## 2020-08-26 NOTE — PROGRESS NOTES
AMBULATORY PROGRESS NOTE      Patient: Marisa Cook             MRN: [id number]     SSN: [id number] Body mass index is 24.26 kg/m². YOB: 1965     AGE: 60 y.o. EX: female    PCP: Ben Nguyen MD       IMPRESSION //  DIAGNOSIS AND TREATMENT PLAN      DIAGNOSES  1. Neuritis of foot, right        Orders Placed This Encounter    Generic Supply Order     Modification to right orthotic: Please add more medial posting to minimize lateral ankle impingement.  lidocaine (Lidoderm) 5 %     Si Patch by TransDERmal route every twenty-four (24) hours. Apply patch to the affected area for 12 hours a day and remove for 12 hours a day. Dispense:  1 Package     Refill:  0     12 HOURS ON   THEN 12 HOURS OFF    CUT TO FIT     Plan:    1. DME order: Modification to right orthotic: Please add more medial posting to minimize lateral ankle impingement. 2. Renewed Lidoderm Rx  3. Work Note: Please allow Ms. Kent to wear athletic shoes 100% of the time while on the job. No prolonged standing  And walking for more than 15 minutes at a time. No heavy lifting over 20 pounds. RTO- 6 weeks      HPI //  [address] A 60 y.o. female who presents to my outpatient office for evaluation of:  Post-traumatic arthritis of right ankle. At the last OV, I recommended patient to see Gregory Mak at [facility] for modification of orthotic: remove metatarsal padding and down some of the medial posting, as she only needed a slightly medially posted orthotic for this right side. Since the last OV, Marisa Cook obtained the orthotics from [facility]. She has an appointment on 2020 to get the orthotics modified. She states that her insurance would only cover the right orthotic. She has been wearing the orthotic for the last three weeks, but notes that her knee is turning inwards when he walks. She would like to have orthotic further modified.  She also reports persistent swelling with prolonged walking and prolonged wearing of the tennis shoes. She recently went to [place] but notes that she was not able to drive in her tennis shoes due to pain and swelling; she had to wear her flip flops. She has been using the Lidoderm patch, which has provided some relief. She inquired about a weight limit for lifting for her job. On examination 8/26/2020 , the patient is alert, oriented (name, place, time) and follows commands. she is in no acute distress and her affect and mood are appropriate. Visit Vitals  /72 (BP 1 Location: Left arm, BP Patient Position: Sitting)   Pulse 75   Temp 98 °F (36.7 °C) (Oral)   Resp 15   Ht 5' 5\" (1.651 m)   Wt 145 lb 12.8 oz (66.1 kg)   SpO2 98%   BMI 24.26 kg/m²         Right ankle examined well-healed surgical scar seen anteriorly, she does have diminished ankle dorsiflexion, more preserved plantarflexion. She has full eversion does not have full inversion I.  Her pulses are intact toes are nice warm and pink. There is no gross instability, of this ankle. No signs of DVT//  Does not have full range of motion of the ankle       CHART REVIEW     Patient Active Problem List   Diagnosis Code    Follicle cyst T74.73    Constipation K59.00    Rectocele N81.6    Hemorrhoids K64.9    Fracture of distal end of tibia with fibula S82.309A, S82.839A    Palpitations R00.2    SOB (shortness of breath) R06.02    Dizziness R42        Maya Issa has been experiencing pain and discomfort confirmed as outlined in the pain assessment outlined below. Pain Assessment  8/26/2020   Location of Pain Ankle   Pain Location Comment -   Location Modifiers Right   Severity of Pain 7   Quality of Pain Dull   Quality of Pain Comment tightness.  swelling   Duration of Pain Persistent   Frequency of Pain Constant   Aggravating Factors Walking;Standing   Aggravating Factors Comment -   Limiting Behavior -   Relieving Factors Rest   Relieving Factors Comment lodine Result of Injury No   Work-Related Injury -   Type of Injury -        Maya Issa  has a past medical history of Colon polyps, Constipation, Heart murmur, Hemorrhoids, Mitral valve prolapse, Nausea & vomiting, Plantar fasciitis, and Vestibular neuritis. Patients is employed at:         Past Medical History:   Diagnosis Date    Colon polyps     Constipation     Heart murmur     Hemorrhoids     Mitral valve prolapse     Nausea & vomiting     \"VIOLENTLY SICK AFTER TUBAL LIGATION\"    Plantar fasciitis     Vestibular neuritis      Current Outpatient Medications   Medication Sig    lidocaine (Lidoderm) 5 % 1 Patch by TransDERmal route every twenty-four (24) hours. Apply patch to the affected area for 12 hours a day and remove for 12 hours a day.  etodolac (LODINE) 400 mg tablet take 1 tablet by mouth twice a day with meals    ondansetron (ZOFRAN ODT) 8 mg disintegrating tablet Take 1 Tab by mouth every eight (8) hours as needed for Nausea or Nausea or Vomiting.  oxybutynin chloride XL (DITROPAN XL) 10 mg CR tablet Take 1 Tab by mouth daily.  multivitamin (ONE A DAY) tablet Take 1 Tab by mouth daily.  cholecalciferol, vitamin D3, (VITAMIN D3) 2,000 unit tab Take  by mouth.  lidocaine (LIDODERM) 5 % Apply patch to the affected area for 12 hours a day and remove for 12 hours a day. Cut to fit.  busPIRone (BUSPAR) 15 mg tablet Take 15 mg by mouth two (2) times a day.  cycloSPORINE (RESTASIS MULTIDOSE) 0.05 % drop ophthalmic drops 1 Drop two (2) times a day.  coenzyme q10 (CO Q-10) 10 mg cap Take  by mouth.  red yeast rice extract 600 mg cap Take 600 mg by mouth now.  calcium-cholecalciferol, d3, (CALCIUM 600 + D) 600-125 mg-unit tab Take  by mouth.  alendronate (FOSAMAX) 70 mg tablet Take  by mouth. No current facility-administered medications for this visit. THE  FOR Maya Issa  WAS REVIEWED BY Arielle Vasquez MD 8/26/2020 .    Allergies   Allergen Reactions    Latex Other (comments)     \"YEAST INFECTION\"    Wasp [Venom-Wasp] Anaphylaxis    Egg Swelling     Thick phlegm     Amoxicillin Other (comments)     Gave pt bad ha's    Flagyl [Metronidazole] Other (comments)     Pt states headaches and vomiting      Urised [Meth-Hyos-Atrp-M Blue-Ba-Phsal] Itching and Swelling     Past Surgical History:   Procedure Laterality Date    COLONOSCOPY N/A 10/12/2018    COLONOSCOPY [facility]on[facility]iroga [facility] at [facility]  [facility] HX BREAST LUMPECTOMY      right    HX COLONOSCOPY      2010,2013    HX CYST REMOVAL  2010    right breast    HX GYN      hysterectomy    HX HEENT      tonselectomy    HX HEENT      lasik eye surgery    HX OTHER SURGICAL      cystoscopy    HX OTHER SURGICAL  2010    right ovary removed    HX TUBAL LIGATION       Social History     Occupational History    Not on file   Tobacco Use    Smoking status: Never Smoker    Smokeless tobacco: Never Used   Substance and Sexual Activity    Alcohol use: Yes     Comment: OCCASIONALLY    Drug use: No    Sexual activity: Not on file     Family History   Problem Relation Age of Onset    Cancer Sister     Cancer Father         colon    Cancer Maternal Grandmother     Hypertension Mother     Cancer Mother     Colon Cancer Paternal Aunt     Cancer Paternal Uncle         prostate        DIAGNOSTIC IMAGING  LAB DATA      No results found for: HBA1C, HGBE8, QZN1FSVL, UBZ3FYBR // No results found for: GLU, GLUCPOC     No results found for: ARS9GMMC, JKO5DVYG      No results found for: Redgie Ege, XQVID3, XQVID, VD3RIA, ZMMW72IGALJ      REVIEW OF SYSTEMS : 8/26/2020  ALL BELOW ARE Negative except : SEE HPI     Constitutional: Negative for fever, chills and weight loss. Neg Weight Loss  Cardiovascular: Negative for chest pain, claudication and leg swelling.  SOB, PORTER   Gastrointestinal/Urological: Negative for pain, N/V/D/C, Blood in stool or urine,dysuria, Hematuria, Incontinence  Musculoskeletal: see HPI. Neurological: Negative for dizziness and weakness, headaches,Visual Changes or             Confusion, or Seizures,   Psychiatric/Behavioral: Negative for depression, memory loss and substance abuse. Extremities:  Negative for hair changes, rash or skin lesion changes. Hematologic: Negative for Bleeding problems, bruising, pallor or swollen lymph nodes. Peripheral Vascular: No calf pain, vascular vein tenderness to calf pain// No calf throbbing, posterior knee throbbing pain     DIAGNOSTIC IMAGING      Please see above section of this report. I have personally reviewed the results of the above study. The interpretation of this study is my professional opinion.       Benny Gamboa MD  8/26/2020  5:06 PM

## 2020-09-01 DIAGNOSIS — M25.571 ACUTE RIGHT ANKLE PAIN: ICD-10-CM

## 2020-09-01 DIAGNOSIS — S82.841S CLOSED BIMALLEOLAR FRACTURE OF RIGHT ANKLE, SEQUELA: ICD-10-CM

## 2020-09-02 RX ORDER — ETODOLAC 400 MG/1
TABLET, FILM COATED ORAL
Qty: 60 TAB | Refills: 0 | Status: SHIPPED | OUTPATIENT
Start: 2020-09-02 | End: 2022-01-07

## 2020-09-20 NOTE — LETTER
NOTIFICATION RETURN TO WORK / SCHOOL 
 
11/14/2018 1:55 PM 
 
Ms. Helen Engel 
[address] [facility] 
[address] [place] [phone] To Whom It May Concern: 
 
Helen Engel is currently under the care of [address] [place], [place] [address]corey Turner. Please allow Ms. Donnie Lesch to return to work. She is not to stand for longer than 15 minutes in any continuous fashion, and she is not to lift more than 25-30 pounds. If there are questions or concerns please have the patient contact our office.  
 
 
 
Sincerely, 
 
 
Maureen Kent MD 
 
                                
 
 close supervision

## 2020-11-05 DIAGNOSIS — M25.571 ACUTE RIGHT ANKLE PAIN: ICD-10-CM

## 2020-11-05 DIAGNOSIS — S82.841S CLOSED BIMALLEOLAR FRACTURE OF RIGHT ANKLE, SEQUELA: ICD-10-CM

## 2020-11-07 RX ORDER — ETODOLAC 400 MG/1
TABLET, FILM COATED ORAL
Qty: 60 TAB | Refills: 0 | OUTPATIENT
Start: 2020-11-07

## 2020-11-16 ENCOUNTER — HOSPITAL ENCOUNTER (OUTPATIENT)
Dept: PHYSICAL THERAPY | Age: 55
Discharge: HOME | End: 2020-11-16
Payer: MEDICAID

## 2020-11-16 PROCEDURE — 97162 PT EVAL MOD COMPLEX 30 MIN: CPT

## 2020-11-16 NOTE — PROGRESS NOTES
PT DAILY TREATMENT NOTE/VESTIBULAR EVAL     Patient Name: Katherin Bryant  Date:2020  : 1965  [x]  Patient  Verified  Payor: Scottie Denver / Plan: [place] [facility] [employer] / Product Type: Managed Care Medicaid /    In time:10:40  Out time:11:25  Total Treatment Time (min): 45  Visit #: 1 of 12      Treatment Area: Other abnormalities of gait and mobility [R26.89]    SUBJECTIVE  Pain Level (0-10 scale): 6.5/10 right ankle, 7/10 dizzy   []constant []intermittent []improving []worsening []no change since onset    Any medication changes, allergies to medications, adverse drug reactions, diagnosis change, or new procedure performed?: [x] No    [] Yes (see summary sheet for update)  Subjective functional status/changes:       Pt is a 61 yo F who presents with constant dizziness d/t vestibular neuronitis. She has had multiple rounds of therapy without improvement. Her sister moved in with her in August, and her sister has a dog, which aggravated her symptoms. She also reports severe spinning dizziness with left sidelying from August to October, but that has resolved. She did not have therapy during this time, and her last round of therapy was in 2020.   Dizziness is constant and aggravated with standing         Barriers: []pain []financial []time []transportation []other  Motivation: high  Substance use: []Alcohol []Tobacco []other:   FABQ Score: []low []elevate  Cognition: A & O x 4    Other:    OBJECTIVE/EXAMINATION      45 min [x]Eval                  []Re-Eval               With   [] TE   [] TA   [] neuro   [] other: Patient Education: [x] Review HEP    [] Progressed/Changed HEP based on:   [] positioning   [] body mechanics   [] transfers   [] heat/ice application    [x] other: educated patient regarding 3 systems of balance, ankle/hip/stepping strategy, anatomy/function of inner ear, VOR, purpose of habituation, SOT results, purpose of therapy, and therapy options      Other Objective/Functional Measures:     /76 taken manually prior to therapy     Physical Therapy Evaluation - Vestibular    Posture:  [] WNL      [x] Forward head    [x] Protracted shoulders    [] Retracted shoulders      C/S ROM: [x] WFL    [] Limited    Describe:    Strength: [x] WFL    [] Limited    Describe:    Optional Tests:  Sensation:  [x] Intact [] Diminished    Describe:    Coordination Testing:       Disdiadochokinesia [x] WFL    [] Impaired    Describe:       Heel - Shin  [x] [facility]    [] Impaired    Describe:       FNF   [x] [facility]    [] Impaired    Describe: Toe Tap   [x] [facility]    [] Impaired    Describe:    Oculomotor Tests: (Fixation Not Blocked)       Ocular ROM:   [x] WFL    [] Limited    Describe:       Spontaneous Nystag. [x] Neg     [] Pos    [] Left    [] Right       Gaze Holding Nystag. [] Neg     [x] Pos    [x] Left    [] Right        Smooth Pursuit  [x] Neg     [] Pos    [] Left    [] Right        Saccades   [x] Neg     [] Pos    [] Left    [] Right        VOR - Slow Head Mvmt [x] Neg     [] Pos    [] Left    [] Right        VOR - Fast Head Mvmt [x] Neg     [] Pos    [] Left    [] Right - symptoms but able to maintain visual fixation        Head Thrust  [x] Neg     [] Pos    [] Left    [] Right - symptoms but able to maintain visual fixation     Other Special Tests:       Vertebral Artery Testing [x] Neg     [] Pos    [x] Left    [x] Right       Hallpike-[place] Maneuver [x] Neg     [] Pos    [x] Left    [x] Right - no significant change in symptoms        Roll Test   [x] Neg     [] Pos    [x] Left    [x] Right       Functional Gait Assess.  [] Neg     [] Pos    Score: Not assessed d/t time constraint     Balance Standard Testing (Eyes Open/Eyes Closed - EO/EC)       Romberg   [] WFL    [] Pos    Describe: Not assessed d/t time constraint           Stand on Foam  [] [facility]    [] Pos    Describe: Not assessed d/t time constraint         Single Leg Stand  [] [facility]    [] Pos    Describe: Not assessed d/t time constraint        Other Tests:  SOT Composite Score: 46    Pt had a lot of questions regarding purpose of therapy and therapist's recommendations  Therapist recommended trial of physical therapy d/t change in symptoms since last round of therapy  Discussed possibilities of attending therapy at this location or attending therapy at a location closer to home and being transferred to this location for SOT retest     Pt reports she is going to give therapy some thought and discuss with her MD at follow up next week. She will call if she wishes to return to therapy. She is aware she will be DC if she does not return within 30 days      Pain Level (0-10 scale) post treatment: 6.5/10 right ankle, 8/10 dizzy     ASSESSMENT/Changes in Function: See POC    Patient will continue to benefit from skilled PT services to modify and progress therapeutic interventions, address functional mobility deficits, address ROM deficits, address strength deficits, analyze and address soft tissue restrictions, analyze and cue movement patterns, analyze and modify body mechanics/ergonomics, assess and modify postural abnormalities, address imbalance/dizziness and instruct in home and community integration to attain remaining goals.      [x]  See Plan of Care  []  See progress note/recertification  []  See Discharge Summary         Progress towards goals / Updated goals:  See POC    PLAN  [x]  Upgrade activities as tolerated     []  Continue plan of care  [x]  Update interventions per flow sheet       []  Discharge due to:_  []  Other:_      Donaciano Lennox, PT 11/16/2020  10:35 AM

## 2020-11-16 NOTE — PROGRESS NOTES
[facility] [place] [facility]  [address] [place] [address]  [phone] fax    Plan of Care/ Statement of Necessity for Physical Therapy Services    Patient name: Lora Michel Start of Care: 2020   Referral source: Ravi Ramirez* : 1965    Medical Diagnosis: Other abnormalities of gait and mobility [R26.89]  Payor: [employer] / Plan: [address] [place] [address] 60 / Product Type: Managed Care Medicaid /  Onset Date:Initial onset , exacerbation 2020    Treatment Diagnosis: Dizziness, Balance    Prior Hospitalization: see medical history Provider#: [id number]   Medications: Verified on Patient summary List    Comorbidities: osteopenia, latex allergey    Prior Level of Function: lives with sister in a 1 story home, Ind with ambulation and household management, constant low grade dizziness       The Plan of Care and following information is based on the information from the initial evaluation. Assessment/ key information: Pt is a 61 yo F who presents with constant dizziness since  after she stood up from bending over in the shower. Referring diagnosis is chronic idiopathic vestibulopathy of left ear and peripheral vestibulopathy of B ears. Pt reports multiple rounds of therapy without improvement. Her sister moved in with her in August.  Her sister has a dog, which aggravated her symptoms d/t allergies, and pt experienced severe spinning dizziness with left sidelying from August to October, but this has resolved. She did not have therapy during this time, and her last round of therapy ended in 2020. Dizziness is constant and is aggravated with standing. BLE coordination, strength, and sensation testing are [facility] as is cervical AROM.   She presented with (+) gaze holding nystagmus and symptoms with VOR with fast head movements and VOR with head thrust B.  SOT testing revealed no vestibular input utilized to maintain balance, and composite score of 46 is below age-related norms. Pt originally requested SOT testing only and DC from therapy as she reports she has had therapy in the past without improvement; however, therapist recommends brief trial of vestibular therapy d/t change in symptoms since she was last DC from therapy. Pt will benefit from skilled PT to address dizzy symptoms and static/dynamic balance deficits in order to reduce fall risk and improve ease/safety with daily tasks. Evaluation Complexity History MEDIUM  Complexity : 1-2 comorbidities / personal factors will impact the outcome/ POC ; Examination HIGH Complexity : 4+ Standardized tests and measures addressing body structure, function, activity limitation and / or participation in recreation  ;Presentation MEDIUM Complexity : Evolving with changing characteristics  ; Clinical Decision Making MEDIUM Complexity : FOTO score of 26-74 - based on clinical judgment   Overall Complexity Rating: MEDIUM  Problem List: pain affecting function, decrease ROM, decrease strength, impaired gait/ balance, decrease ADL/ functional abilitiies, decrease activity tolerance, decrease flexibility/ joint mobility and decrease transfer abilities   Treatment Plan may include any combination of the following: Therapeutic exercise, Therapeutic activities, Neuromuscular re-education, Physical agent/modality, Gait/balance training, Manual therapy, Patient education, Self Care training, Functional mobility training, Home safety training and Stair training  Patient / Family readiness to learn indicated by: asking questions, trying to perform skills and interest  Persons(s) to be included in education: patient (P) and family support person (FSP);list son, son, sister  Barriers to Learning/Limitations: None  Patient Goal (s): less to no lightheadedness and/or dizziness  Patient Self Reported Health Status: fair  Rehabilitation Potential: fair    Short Term Goals:  To be accomplished in 2 weeks: 1. Pt will be compliant with initial HEP in order to optimize therapy outcomes. Long Term Goals: To be accomplished in 4 weeks:  1. Pt will improve FOTO by 5 points in order to demonstrate functional improvement. 2.  Pt will report 25% improvement in symptoms in order to improve QOL. 3.  Pt will improve SOT composite results by 5 points in order to demonstrate improve utilization of sensory input to maintain balance for reduced fall risk. Frequency / Duration: Patient to be seen 2-3 times per week for 4 weeks. Patient/ CarPatient/ Caregiver education and instruction: Diagnosis, prognosis, other therapy options and therapist's recommendations   [x]  Plan of care has been reviewed with PRABHJOT Cochran, PT 11/16/2020 10:36 AM    ________________________________________________________________________    I certify that the above Therapy Services are being furnished while the patient is under my care. I agree with the treatment plan and certify that this therapy is necessary.     Physician's Signature:____________Date:_________TIME:________    ** Signature, Date and Time must be completed for valid certification **    Please sign and return to [facility]  [address] [facility]  [phone] fax

## 2020-11-17 ENCOUNTER — TELEPHONE (OUTPATIENT)
Dept: PHYSICAL THERAPY | Age: 55
End: 2020-11-17

## 2020-11-17 ENCOUNTER — VIRTUAL VISIT (OUTPATIENT)
Dept: ORTHOPEDIC SURGERY | Age: 55
End: 2020-11-17
Payer: MEDICAID

## 2020-11-17 DIAGNOSIS — S82.841S CLOSED BIMALLEOLAR FRACTURE OF RIGHT ANKLE, SEQUELA: ICD-10-CM

## 2020-11-17 DIAGNOSIS — M19.171 POST-TRAUMATIC ARTHRITIS OF RIGHT ANKLE: Primary | ICD-10-CM

## 2020-11-17 DIAGNOSIS — G57.91 NEURITIS OF FOOT, RIGHT: ICD-10-CM

## 2020-11-17 PROCEDURE — 99212 OFFICE O/P EST SF 10 MIN: CPT | Performed by: ORTHOPAEDIC SURGERY

## 2020-11-17 RX ORDER — ETODOLAC 400 MG/1
400 TABLET, FILM COATED ORAL 2 TIMES DAILY WITH MEALS
Qty: 60 TAB | Refills: 3 | Status: SHIPPED | OUTPATIENT
Start: 2020-11-17 | End: 2021-07-18

## 2020-11-17 NOTE — PROGRESS NOTES
Patient: Adam Jett             MRN: [id number]     SSN: [id number] There is no height or weight on file to calculate BMI. YOB: 1965     AGE: 60 y.o. EX: female    PCP: Licha Medley MD     VIRTUAL VISIT      Adam Jett is a 60 y.o. female who was seen by virtual synchronous (real-time) audio-video technology on 11/17/2020. via  [employer]. This virtual Telehealth visit was performed while I was located at : [facility], and Adam Jett was located at her home at 7:58 AM, 11/17/2020. Services were provided as a substitute for in-person clinic visit. Virtual time  OV/Chart Review was[de-identified]  8:00 AM to 8:14 AM   of 11/17/2020     Consent:@ and/or the patient's healthcare decision maker is aware that this patient-initiated Telehealth encounter is a billable service, with coverage as determined by their insurance carrier. The patient is aware that they may receive a bill and has provided verbal consent to proceed. Disclaimer: Sections of this note are dictated using utilizing voice recognition software, which may have resulted in some phonetic based errors in grammar and contents. Even though attempts were made to correct all the mistakes, some may have been missed, and remained in the body of the document. If questions arise, please contact our department. ASSESSMENT        ICD-10-CM ICD-9-CM    1. Post-traumatic arthritis of right ankle  M19.171 716.17 etodolac (LODINE) 400 mg tablet   2. Neuritis of foot, right  G57.91 355.8    3. Closed bimalleolar fracture of right ankle, sequela  S82.841S 905.4 etodolac (LODINE) 400 mg tablet        She is hanging in there is to have some discomfort, in her ankles which is post medic arthritis her ankle. Orthotic was modified many times has not really helped her uniformly. Persist but she still using orthotic as needed for by reach orthotics and prosthetics.   She is taking her Lodine, as well as modifying her activities. Options for her, via cortisone injection. She was to hold off on this at this current time. We will reassess her in 3 months, virtual visit    Orders Placed This Encounter    etodolac (LODINE) 400 mg tablet     Sig: Take 400 mg by mouth two (2) times daily (with meals). Dispense:  60 Tab     Refill:  3         PLAN       Continue activity as tolerated, Lodine prescription will be sent to her pharmacy, [facility], and [facility]en[facility], and [facility]: 3-month follow-up virtual follow-up               CPT Codes 28544-89969 for Established Patients may apply to this Telehealth Visit  Time-based coding, delete if not needed: I spent at least 10 minutes with this established patient, and >50% of the time was spent counseling and/or coordinating care regarding      Due to this being a TeleHealth evaluation, many elements of the physical examination are unable to be assessed. We discussed the expected course, resolution and complications of the diagnosis(es) in detail. Medication risks, benefits, costs, interactions, and alternatives were discussed as indicated. I advised her to contact the office if her condition worsens, changes or fails to improve as anticipated. She expressed understanding with the diagnosis(es) and plan. Pursuant to the emergency declaration under the [facility] waiver authority and the [employer] and [employer] Act, this Virtual  Visit was conducted, with patient's consent, to reduce the patient's risk of exposure to COVID-19 and provide continuity of care for an established patient. SUBJECTIVE      Ival Peel was seen for No chief complaint on file. Still in pain discomfort, to her ankle where she has posttraumatic arthritis having sustained a severe distal tibia pilon type fracture initially treated [address].     Since my last OV with Ival Peel, she states that she is still having same level of pain/discomfort, is standing/walking less time duration or distances. Flaquita Castro has been seen for : above Dx is listed above    Flaquita Castro is taking medications for pain relief[de-identified]    Lodine and Lidoderm    Flaquita Castro Denies: CP, SOB, ABD PAIN, Calf pain. ASHLEY TRIAGE QUESTIONNAIRE    Since your last office visit, have you had any:    New medical diagnoses No  Changes in your medications  No  Surgical procedures No  Changes in your Allergies to medication No  What is your pain level today still in pain discomfort/10  Changes in: PMH, SH, ROS: No          CHART REVIEW     Current Outpatient Medications   Medication Sig    etodolac (LODINE) 400 mg tablet Take 400 mg by mouth two (2) times daily (with meals).  etodolac (LODINE) 400 mg tablet take 1 tablet by mouth twice a day with meals    lidocaine (Lidoderm) 5 % 1 Patch by TransDERmal route every twenty-four (24) hours. Apply patch to the affected area for 12 hours a day and remove for 12 hours a day.  ondansetron (ZOFRAN ODT) 8 mg disintegrating tablet Take 1 Tab by mouth every eight (8) hours as needed for Nausea or Nausea or Vomiting.  oxybutynin chloride XL (DITROPAN XL) 10 mg CR tablet Take 1 Tab by mouth daily.  multivitamin (ONE A DAY) tablet Take 1 Tab by mouth daily.  cholecalciferol, vitamin D3, (VITAMIN D3) 2,000 unit tab Take  by mouth.  lidocaine (LIDODERM) 5 % Apply patch to the affected area for 12 hours a day and remove for 12 hours a day. Cut to fit.  alendronate (FOSAMAX) 70 mg tablet Take  by mouth.  busPIRone (BUSPAR) 15 mg tablet Take 15 mg by mouth two (2) times a day.  cycloSPORINE (RESTASIS MULTIDOSE) 0.05 % drop ophthalmic drops 1 Drop two (2) times a day.  coenzyme q10 (CO Q-10) 10 mg cap Take  by mouth.  red yeast rice extract 600 mg cap Take 600 mg by mouth now.     calcium-cholecalciferol, d3, (CALCIUM 600 + D) 600-125 mg-unit tab Take  by mouth.     No current facility-administered medications for this visit. THE  FOR Yenny Carrillo  WAS REVIEWED BY Carol Quintero MD 11/17/2020 . Social History     Occupational History    Not on file   Tobacco Use    Smoking status: Never Smoker    Smokeless tobacco: Never Used   Substance and Sexual Activity    Alcohol use: Yes     Comment: OCCASIONALLY    Drug use: No    Sexual activity: Not on file        Prior to Admission medications    Medication Sig Start Date End Date Taking? Authorizing Provider   etodolac (LODINE) 400 mg tablet Take 400 mg by mouth two (2) times daily (with meals). 11/17/20  Yes Rola Simpson MD   etodolac (LODINE) 400 mg tablet take 1 tablet by mouth twice a day with meals 9/2/20   Nasir Edmondson, MD   lidocaine (Lidoderm) 5 % 1 Patch by TransDERmal route every twenty-four (24) hours. Apply patch to the affected area for 12 hours a day and remove for 12 hours a day. 8/26/20   Nasir Edmondson, MD   ondansetron (ZOFRAN ODT) 8 mg disintegrating tablet Take 1 Tab by mouth every eight (8) hours as needed for Nausea or Nausea or Vomiting. 4/20/20   Naren Lizarraga MD   oxybutynin chloride XL (DITROPAN XL) 10 mg CR tablet Take 1 Tab by mouth daily. 3/26/20   Jake Benavides MD   multivitamin (ONE A DAY) tablet Take 1 Tab by mouth daily. Provider, Historical   cholecalciferol, vitamin D3, (VITAMIN D3) 2,000 unit tab Take  by mouth. Provider, Historical   lidocaine (LIDODERM) 5 % Apply patch to the affected area for 12 hours a day and remove for 12 hours a day. Cut to fit. 1/15/20   Nasir Edmondson, MD   alendronate (FOSAMAX) 70 mg tablet Take  by mouth. Provider, Historical   busPIRone (BUSPAR) 15 mg tablet Take 15 mg by mouth two (2) times a day. Provider, Historical   cycloSPORINE (RESTASIS MULTIDOSE) 0.05 % drop ophthalmic drops 1 Drop two (2) times a day. Provider, Historical   coenzyme q10 (CO Q-10) 10 mg cap Take  by mouth. Provider, Historical   red yeast rice extract 600 mg cap Take 600 mg by mouth now. Provider, Historical   calcium-cholecalciferol, d3, (CALCIUM 600 + D) 600-125 mg-unit tab Take  by mouth.     Provider, Historical     Allergies   Allergen Reactions    Latex Other (comments)     \"YEAST INFECTION\"    Wasp [Venom-Wasp] Anaphylaxis    Egg Swelling     Thick phlegm     Amoxicillin Other (comments)     Gave pt bad ha's    Flagyl [Metronidazole] Other (comments)     Pt states headaches and vomiting      Urised [Meth-Hyos-Atrp-M Blue-Ba-Phsal] Itching and Swelling        ROS    Past Medical History:   Diagnosis Date    Colon polyps     Constipation     Heart murmur     Hemorrhoids     Mitral valve prolapse     Nausea & vomiting     \"VIOLENTLY SICK AFTER TUBAL LIGATION\"    Plantar fasciitis     Vestibular neuritis      Past Surgical History:   Procedure Laterality Date    COLONOSCOPY N/A 10/12/2018    COLONOSCOPY performed by Radha Álvarez MD at [address]      HX BREAST LUMPECTOMY      right    HX COLONOSCOPY      2010,2013    HX CYST REMOVAL  2010    right breast    HX GYN      hysterectomy    HX HEENT      tonselectomy    HX HEENT      lasik eye surgery    HX OTHER SURGICAL      cystoscopy    HX OTHER SURGICAL  2010    right ovary removed    HX TUBAL LIGATION       Social History     Socioeconomic History    Marital status: SINGLE     Spouse name: Not on file    Number of children: Not on file    Years of education: Not on file    Highest education level: Not on file   Occupational History    Not on file   Social Needs    Financial resource strain: Not on file    Food insecurity     Worry: Not on file     Inability: Not on file    Transportation needs     Medical: Not on file     Non-medical: Not on file   Tobacco Use    Smoking status: Never Smoker    Smokeless tobacco: Never Used   Substance and Sexual Activity    Alcohol use: Yes     Comment: OCCASIONALLY    Drug use: No    Sexual activity: Not on file   Lifestyle    Physical activity     Days per week: Not on file     Minutes per session: Not on file    Stress: Not on file   Relationships    Social connections     Talks on phone: Not on file     Gets together: Not on file     Attends Yazidi service: Not on file     Active member of club or organization: Not on file     Attends meetings of clubs or organizations: Not on file     Relationship status: Not on file    Intimate partner violence     Fear of current or ex partner: Not on file     Emotionally abused: Not on file     Physically abused: Not on file     Forced sexual activity: Not on file   Other Topics Concern    Not on file   Social History Narrative    Not on file     Family History   Problem Relation Age of Onset    Cancer Sister     Cancer Father         colon    Cancer Maternal Grandmother     Hypertension Mother     Cancer Mother     Colon Cancer Paternal Aunt     Cancer Paternal Uncle         prostate          OBJECTIVE     General: alert, cooperative, no distress   Mental  status: mental status: alert, oriented to person, place, and time, normal mood, behavior, speech, dress, motor activity, and thought processes   Resp:  no respiratory distress   Neuro: Neuro: see above   Skin: skin: please see assessment section of this report   Live video viewing was used and showed:  Her on video              Trinidad Mariano MD  11/17/2020  7:58 AM

## 2020-11-25 ENCOUNTER — TELEPHONE (OUTPATIENT)
Dept: PHYSICAL THERAPY | Age: 55
End: 2020-11-25

## 2020-12-30 NOTE — PROGRESS NOTES
[facility]  [address] [facility]  [phone] fax    Physical Therapy Discharge Summary    Patient name: Porsha Lara Start of Care: 20   Referral source: Darya Flores* : 1965   Medical/Treatment Diagnosis: Other abnormalities of gait and mobility [R26.89]  Payor: [employer] / Plan: [address] [place] [address] [address] / Product Type: Managed Care Medicaid /  Onset Date::Initial onset , exacerbation 2020                  Prior Hospitalization: see medical history Provider#: [id number]   Medications: Verified on Patient Summary List    Comorbidities: osteopenia, latex allergey    Prior Level of Function: lives with sister in a 1 story home, Ind with ambulation and household management, constant low grade dizziness             Visits from Start of Care: 1    Missed Visits: 0    Reporting Period : 20 to 20    Summary of Care:  Short Term Goals: To be accomplished in 2 weeks:  1. Pt will be compliant with initial HEP in order to optimize therapy outcomes. Long Term Goals: To be accomplished in 4 weeks:  1. Pt will improve FOTO by 5 points in order to demonstrate functional improvement. 2.  Pt will report 25% improvement in symptoms in order to improve QOL. 3.  Pt will improve SOT composite results by 5 points in order to demonstrate improve utilization of sensory input to maintain balance for reduced fall risk.     No goals met as pt did not return to therapy following initial evaluation     ASSESSMENT/RECOMMENDATIONS:  Pt called and reported that she followed up with MD, and she and MD both agreed that pt should continue independently with HEP and DC from therapy. DC at this time.      [x]Discontinue therapy: []Patient has reached or is progressing toward set goals      [x]Patient is non-compliant or has abdicated      []MD Ava Nye, PT 2020 1:21 PM    NOTE TO PHYSICIAN:  Please complete the following and fax to: [facility] at [facility] at [phone]  Retain this original for your records. If you are unable to process this request in   24 hours, please contact our office.      [] I have read the above report and request that my patient continue therapy with the following changes/special instructions:  [] I have read the above report and request that my patient be discharged from therapy    Physician's Signature:_____________________ Date:___________Time:__________

## 2021-04-27 ENCOUNTER — OFFICE VISIT (OUTPATIENT)
Dept: ORTHOPEDIC SURGERY | Age: 56
End: 2021-04-27
Payer: MEDICARE

## 2021-04-27 VITALS
OXYGEN SATURATION: 99 % | BODY MASS INDEX: 25.83 KG/M2 | WEIGHT: 155 LBS | RESPIRATION RATE: 16 BRPM | HEIGHT: 65 IN | HEART RATE: 71 BPM

## 2021-04-27 DIAGNOSIS — M19.171 POST-TRAUMATIC ARTHRITIS OF RIGHT ANKLE: Primary | ICD-10-CM

## 2021-04-27 PROCEDURE — G8510 SCR DEP NEG, NO PLAN REQD: HCPCS | Performed by: ORTHOPAEDIC SURGERY

## 2021-04-27 PROCEDURE — G8427 DOCREV CUR MEDS BY ELIG CLIN: HCPCS | Performed by: ORTHOPAEDIC SURGERY

## 2021-04-27 PROCEDURE — 3017F COLORECTAL CA SCREEN DOC REV: CPT | Performed by: ORTHOPAEDIC SURGERY

## 2021-04-27 PROCEDURE — 99213 OFFICE O/P EST LOW 20 MIN: CPT | Performed by: ORTHOPAEDIC SURGERY

## 2021-04-27 PROCEDURE — G8419 CALC BMI OUT NRM PARAM NOF/U: HCPCS | Performed by: ORTHOPAEDIC SURGERY

## 2021-04-27 NOTE — PROGRESS NOTES
AMBULATORY PROGRESS NOTE      Patient: Mague Prieto             MRN: [id number]     SSN: [id number] Body mass index is 25.79 kg/m². YOB: 1965     AGE: 60 y.o. EX: female    PCP: Valerie Dolan MD       IMPRESSION //  DIAGNOSIS AND TREATMENT PLAN        Mague Prieto has       DIAGNOSES  1. Post-traumatic arthritis of right ankle        No orders of the defined types were placed in this encounter. PLAN:    1. PRN RTO/CONTINUE CONSERVATIVE CARE (NO OPERATIVE INDICATIONS AT THIS TIME)    Mague Prieto  expresses understanding of the diagnosis, treatment plan, and all of their proposed questions were answered to their satisfaction. Patient education has been provided re the diagnoses. Mague Prieto may have a reminder for a \"due or due soon\" health maintenance. I have asked that she contact her primary care provider for follow-up on this health maintenance. HPI //  Perez Chinchilla IS A 60 y.o. female who is a/an  established patient, presenting to my outpatient office for evaluation of  the following chief complaint(s):     Chief Complaint   Patient presents with    Ankle Pain     RIGHT ANKLE     Patient's last appointment was virtual and which she stated she was hanging in there is to have some discomfort, in her ankles which was post medic arthritis her ankle. Orthotic was modified many times has not really helped her uniformly. Persist but she was still using orthotic as needed for by reach orthotics and prosthetics. She was taking her Lodine, as well as modifying her activities. Options for her, via cortisone injection. She was to hold off on this at this current time. I planned to reassess her in 3 months, virtual visit. I advised the pt to continue activity as tolerated, Lodine prescription was be sent to her pharmacy, [facility], and [facility] [place]. I planned to f/u in 3-months.      Since LOV pt reports continued right ankle pain. She presented today with medicated creams, Lidoderm patch, and an orthotic for her right ankle. Pt recalls having a Cortisone injection for her plantar fascitis that caused numbness in her foot. Therefore, she does not want to have a Cortisone injection. She states her insurance is changing so she will be f/u with her PCP. Visit Vitals  Pulse 71   Resp 16   Ht 5' 5\" (1.651 m)   Wt 155 lb (70.3 kg)   SpO2 99%   BMI 25.79 kg/m²       Appearance: Alert, well appearing and pleasant patient who is in no distress, oriented to person, place/time, and who follows commands. This patient is accompanied in the examination room by her  self. There is signs of: no dementia  Psychiatric: Affect/mood are appropriate. Speech normal in context and clarity, memory intact grossly, no involuntary movements - tremors. Patient arrives to office via: without assistive device. H EENT (2): Head normocephalic & atraumatic. Both pupils are round     Eye: EOM are intact // Neck: ROM WNL  //  Hearings Intact   Respiratory: Breathing non labored     ANKLE/FOOT right    Gait: slow  Tenderness: mild  Anterior ankle region  Cutaneous:   WNL  Joint Motion: preserved hindfoot ROM. Joint / Tendon Stability: No Ankle or Subtalar instability or joint laxity. No peroneal sublux ability or dislocation  Alignment: neutral Hindfoot,    Neuro Motor/Sensory: NL/NL  Vascular: NL foot/ankle pulses,   Lymphatics: No extremity lymphedema, No calf swelling, no tenderness to calf muscles. CHART REVIEW     Valerie Ramires has been experiencing pain and discomfort confirmed as outlined in the pain assessment outlined below.  was reviewed by Reece Hill MD on 4/27/2021.      Pain Assessment  4/27/2021   Location of Pain Ankle   Pain Location Comment -   Location Modifiers Right   Severity of Pain 6   Quality of Pain Aching   Quality of Pain Comment -   Duration of Pain Persistent Frequency of Pain Constant   Aggravating Factors Bending;Stretching;Walking;Standing   Aggravating Factors Comment -   Limiting Behavior Yes   Relieving Factors Other (Comment)   Relieving Factors Comment PAIN MEDICATION   Result of Injury No   Work-Related Injury -   Type of Injury -        Samm Olivier  has a past medical history of Colon polyps, Constipation, Heart murmur, Hemorrhoids, Mitral valve prolapse, Nausea & vomiting, Plantar fasciitis, and Vestibular neuritis. Patients is employed at:         Past Medical History:   Diagnosis Date    Colon polyps     Constipation     Heart murmur     Hemorrhoids     Mitral valve prolapse     Nausea & vomiting     \"VIOLENTLY SICK AFTER TUBAL LIGATION\"    Plantar fasciitis     Vestibular neuritis      Past Surgical History:   Procedure Laterality Date    COLONOSCOPY N/A 10/12/2018    COLONOSCOPY performed by Fiona Jimenez MD at [place] [id number] [facility] HX BREAST LUMPECTOMY      right    HX COLONOSCOPY      2010,2013    HX CYST REMOVAL  2010    right breast    HX GYN      hysterectomy    HX HEENT      tonselectomy    HX HEENT      lasik eye surgery    HX OTHER SURGICAL      cystoscopy    HX OTHER SURGICAL  2010    right ovary removed    HX TUBAL LIGATION       Current Outpatient Medications   Medication Sig    etodolac (LODINE) 400 mg tablet Take 400 mg by mouth two (2) times daily (with meals).  etodolac (LODINE) 400 mg tablet take 1 tablet by mouth twice a day with meals    lidocaine (Lidoderm) 5 % 1 Patch by TransDERmal route every twenty-four (24) hours. Apply patch to the affected area for 12 hours a day and remove for 12 hours a day.  ondansetron (ZOFRAN ODT) 8 mg disintegrating tablet Take 1 Tab by mouth every eight (8) hours as needed for Nausea or Nausea or Vomiting.  oxybutynin chloride XL (DITROPAN XL) 10 mg CR tablet Take 1 Tab by mouth daily.     multivitamin (ONE A DAY) tablet Take 1 Tab by mouth daily.    cholecalciferol, vitamin D3, (VITAMIN D3) 2,000 unit tab Take  by mouth.  lidocaine (LIDODERM) 5 % Apply patch to the affected area for 12 hours a day and remove for 12 hours a day. Cut to fit.  alendronate (FOSAMAX) 70 mg tablet Take  by mouth.  busPIRone (BUSPAR) 15 mg tablet Take 15 mg by mouth two (2) times a day.  cycloSPORINE (RESTASIS MULTIDOSE) 0.05 % drop ophthalmic drops 1 Drop two (2) times a day.  coenzyme q10 (CO Q-10) 10 mg cap Take  by mouth.  red yeast rice extract 600 mg cap Take 600 mg by mouth now.  calcium-cholecalciferol, d3, (CALCIUM 600 + D) 600-125 mg-unit tab Take  by mouth. No current facility-administered medications for this visit.       Allergies   Allergen Reactions    Latex Other (comments)     \"YEAST INFECTION\"    Wasp [Venom-Wasp] Anaphylaxis    Egg Swelling     Thick phlegm     Amoxicillin Other (comments)     Gave pt bad ha's    Flagyl [Metronidazole] Other (comments)     Pt states headaches and vomiting      Urised [Meth-Hyos-Atrp-M Blue-Ba-Phsal] Itching and Swelling     Social History     Occupational History    Not on file   Tobacco Use    Smoking status: Never Smoker    Smokeless tobacco: Never Used   Substance and Sexual Activity    Alcohol use: Yes     Comment: OCCASIONALLY    Drug use: No    Sexual activity: Not on file     Family History   Problem Relation Age of Onset    Cancer Sister     Cancer Father         colon    Cancer Maternal Grandmother     Hypertension Mother     Cancer Mother     Colon Cancer Paternal Aunt     Cancer Paternal Uncle         prostate        DIAGNOSTIC LAB DATA      No results found for: HBA1C, HGBE8, FKL7MTPQ, LDK4NYQG // No results found for: GLU, GLUCPOC     No results found for: ZYE6DGER, IZF5YYIB      No results found for: Radha Yates, XQVID3, XQVID, VD3RIA, NJWB63VILTO      REVIEW OF SYSTEMS : 4/27/2021  ALL BELOW ARE Negative except : SEE HPI     All other systems reviewed and are negative. 12 point review of systems otherwise negative unless noted in HPI. DIAGNOSTIC IMAGING /ORDERS            I have reviewed the results of the above study. The interpretation of this study is my professional opinion. An electronic signature was used to authenticate this note. Disclaimer: Sections of this note are dictated using utilizing voice recognition software, which may have resulted in some phonetic based errors in grammar and contents. Even though attempts were made to correct all the mistakes, some may have been missed, and remained in the body of the document. If questions arise, please contact our department.      Sahil Suero, as dictated by Johan Perez MD  4/27/2021  11:56 AM

## 2021-07-14 ENCOUNTER — TELEPHONE (OUTPATIENT)
Dept: ORTHOPEDIC SURGERY | Age: 56
End: 2021-07-14

## 2021-07-14 NOTE — TELEPHONE ENCOUNTER
Patient is currently at dentist to get a routine cleaning performed. Unruly Dumont, nurse, called to inquire if patient should be pre-medicated with antibiotics. Spoke with provider who advised if routine cleaning patient is ok to not pre-medicate, but if there is more being done that is more involved he would prefer patient be on Augmentin, at least a couple days bid. Unruly Dumont asked what strength Augmentin and should patient begin taking prior to appt for future appt information. Please return call.      Unruly Dumont at Dr. Zahida Croft  [phone]

## 2021-07-15 NOTE — TELEPHONE ENCOUNTER
Per Dr. Leanne Quinonez, Augmentin 500mg Bid for 2 days. Tried calling Sourav Scott back but no answer. Left voicemail with details about medication and to call back if they have further questions.

## 2021-07-17 DIAGNOSIS — M19.171 POST-TRAUMATIC ARTHRITIS OF RIGHT ANKLE: ICD-10-CM

## 2021-07-17 DIAGNOSIS — S82.841S CLOSED BIMALLEOLAR FRACTURE OF RIGHT ANKLE, SEQUELA: ICD-10-CM

## 2021-07-18 RX ORDER — ETODOLAC 400 MG/1
TABLET, FILM COATED ORAL
Qty: 60 TABLET | Refills: 3 | Status: SHIPPED | OUTPATIENT
Start: 2021-07-18 | End: 2021-07-30

## 2021-08-03 PROBLEM — R31.29 MICROSCOPIC HEMATURIA: Status: ACTIVE | Noted: 2021-08-03

## 2021-10-04 NOTE — PROGRESS NOTES
AMBULATORY PROGRESS NOTE      Patient: Cristóbal Celaya             MRN: [id number]     SSN: [id number] Body mass index is 26.19 kg/m². YOB: 1965     AGE: 60 y.o. EX: female    PCP: Nelda Ayoub MD       IMPRESSION //  DIAGNOSIS AND TREATMENT PLAN        Cristóbal Celaya has a diagnosis of: So she can see her PCP, next few days or so. I will write for her Lodine, and I need to check her renal function panel to make sure her renal function stays normal.  She will call me, after her November 1 visit with her PCP. She has antibiotics, written by her oral surgeon or dentist, she is can have a root canal, next few weeks. Her oral surgeon or dentist, wrote for antibiotics prophylactic, she has hardware in her ankle, right ankle. DIAGNOSES    1. Post-traumatic arthritis of right ankle    2. Pain in right ankle and joints of right foot    3. Muscle spasm        Orders Placed This Encounter    AMB POC XRAY, FOOT; COMPLETE, 3+ VIEW     ASK ALL FEMALE PATIENTS IF PREGNANT     Order Specific Question:   Reason for Exam     Answer:   PAIN    POC XRAY, ANKLE; 2 VIEWS     Order Specific Question:   Reason for Exam     Answer:   pain    acetaminophen (Tylenol Arthritis Pain) 650 mg TbER     Sig: Take 650 mg by mouth every eight (8) hours.  methocarbamoL (Robaxin) 500 mg tablet     Sig: Take 1 Tablet by mouth two (2) times daily as needed for Muscle Spasm(s). Dispense:  30 Tablet     Refill:  1        PLAN:    1. Obtain 2-View XR of right ankle and 3-View XR of right foot  2. Rx of Robaxin BID  3. I plan on reviewing patient's bloodwork once she receives the results. 4. Continue taking  Lodine      RTO-  5 weeks    Cristóbal Celaya  expresses understanding of the diagnosis, treatment plan, and all of their proposed questions were answered to their satisfaction. Patient education has been provided re the diagnoses.          HPI //  [address] [address] A 60 y.o. female who is a/an  established patient, presenting to my outpatient office for evaluation of  the following chief complaint(s):     Chief Complaint   Patient presents with    Ankle Pain     right ankle       At LOV pt presented w/ right ankle pain. PRN RTO/CONTINUE CONSERVATIVE CARE (NO OPERATIVE INDICATIONS AT THIS TIME). Since LOV pt continues to endorse right ankle pain. She went to her PCP who recommended her take 250 mg Tylenol, but they provided no relief. She states Lodine alleviates her right ankle pain. She had  blood work done and her results came back normal.     She will go back to her PCP at November 1, 2021. She has a root canal on 10/18/2021. Visit Vitals  Pulse 72   Temp 97 °F (36.1 °C) (Temporal)   Ht 5' 4.5\" (1.638 m)   Wt 155 lb (70.3 kg)   SpO2 100%   BMI 26.19 kg/m²       Appearance: Alert, well appearing and pleasant patient who is in no distress, oriented to person, place/time, and who follows commands. This patient is accompanied in the examination room by her  self. There is signs of: no dementia  Psychiatric: Affect/mood are appropriate. Speech normal in context and clarity, memory intact grossly, no involuntary movements - tremors. Patient arrives to office via: without assistive device:   H EENT (2): Head normocephalic & atraumatic. Eye: pupils are round// EOM are intact // Neck: ROM WNL  // Hearings Intact   Respiratory: Breathing non labored     ANKLE/FOOT right    Gait: antalgic and slow  Tenderness: mild anterior ankle joint line , anterior tibial tendon region  Cutaneous:  WNL. Joint Motion: Limited ankle range of motion did not have full plantarflexion or dorsiflexion WNL  Joint / Tendon Stability: No Ankle or Subtalar instability or joint laxity.                        No peroneal sublux ability or dislocation  Alignment: neutral Hindfoot,    Neuro Motor/Sensory: NL/NL  Vascular: NL foot/ankle pulses,   Lymphatics: No extremity lymphedema, No calf swelling, no tenderness to calf muscles. CHART REVIEW     Sanjuana Chiu has been experiencing pain and discomfort confirmed as outlined in the pain assessment outlined below.  was reviewed by Adriel Doyle MD on 10/12/2021. Pain Assessment  10/12/2021   Location of Pain Ankle   Pain Location Comment -   Location Modifiers Right   Severity of Pain 8   Quality of Pain Sharp;Locking; Throbbing   Quality of Pain Comment -   Duration of Pain -   Frequency of Pain Intermittent   Aggravating Factors Bending;Stretching;Walking   Aggravating Factors Comment -   Limiting Behavior Yes   Relieving Factors -   Relieving Factors Comment -   Result of Injury -   Work-Related Injury -   Type of Injury -        Sanjuana Chiu  has a past medical history of Colon polyps, Constipation, Heart murmur, Hemorrhoids, Mitral valve prolapse, Nausea & vomiting, Plantar fasciitis, and Vestibular neuritis. Patients is employed at:         Past Medical History:   Diagnosis Date    Colon polyps     Constipation     Heart murmur     Hemorrhoids     Mitral valve prolapse     Nausea & vomiting     \"VIOLENTLY SICK AFTER TUBAL LIGATION\"    Plantar fasciitis     Vestibular neuritis      Past Surgical History:   Procedure Laterality Date    COLONOSCOPY N/A 10/12/2018    COLONOSCOPY performed by Víctor Pierre MD at [address] HX BREAST LUMPECTOMY      right    HX COLONOSCOPY      2010,2013    HX CYST REMOVAL  2010    right breast    HX GYN      hysterectomy    HX HEENT      tonselectomy    HX HEENT      lasik eye surgery    HX OTHER SURGICAL      cystoscopy    HX OTHER SURGICAL  2010    right ovary removed    HX TUBAL LIGATION       Current Outpatient Medications   Medication Sig    acetaminophen (Tylenol Arthritis Pain) 650 mg TbER Take 650 mg by mouth every eight (8) hours.     methocarbamoL (Robaxin) 500 mg tablet Take 1 Tablet by mouth two (2) times daily as needed for Muscle Spasm(s).  atorvastatin (LIPITOR) 10 mg tablet TAKE 1 TABLET BY MOUTH EVERY DAY    meclizine (ANTIVERT) 25 mg tablet Take  by mouth.  etodolac (LODINE) 400 mg tablet take 1 tablet by mouth twice a day with meals    multivitamin (ONE A DAY) tablet Take 1 Tab by mouth daily.  cholecalciferol, vitamin D3, (VITAMIN D3) 2,000 unit tab Take  by mouth.  alendronate (FOSAMAX) 70 mg tablet Take  by mouth.  busPIRone (BUSPAR) 15 mg tablet Take 15 mg by mouth two (2) times a day.  cycloSPORINE (RESTASIS MULTIDOSE) 0.05 % drop ophthalmic drops 1 Drop two (2) times a day.  calcium-cholecalciferol, d3, (CALCIUM 600 + D) 600-125 mg-unit tab Take  by mouth. No current facility-administered medications for this visit.      Allergies   Allergen Reactions    Latex Other (comments)     \"YEAST INFECTION\"    Wasp [Venom-Wasp] Anaphylaxis    Egg Swelling     Thick phlegm     Amoxicillin Other (comments)     Gave pt bad ha's    Flagyl [Metronidazole] Other (comments)     Pt states headaches and vomiting      Urised [Meth-Hyos-Atrp-M Blue-Ba-Phsal] Itching and Swelling     Social History     Occupational History    Not on file   Tobacco Use    Smoking status: Never Smoker    Smokeless tobacco: Never Used   Substance and Sexual Activity    Alcohol use: Yes     Comment: OCCASIONALLY    Drug use: No    Sexual activity: Not on file     Family History   Problem Relation Age of Onset    Cancer Sister     Cancer Father         colon    Cancer Maternal Grandmother     Hypertension Mother     Cancer Mother     Colon Cancer Paternal Aunt     Cancer Paternal Uncle         prostate        DIAGNOSTIC LAB DATA      No results found for: HBA1C, ASI0HXMP, TTV7KWYJ // No results found for: GLU, GLUCPOC     No results found for: QDK3CKLU, JND5TKPH      No results found for: Haven Kussmaul, XQVID3, XQVID, VD3RIA, CSPK12LKQIM      REVIEW OF SYSTEMS : 10/12/2021  ALL BELOW ARE Negative except : SEE HPI     All other systems reviewed and are negative. 12 point review of systems otherwise negative unless noted in HPI. DIAGNOSTIC IMAGING /ORDERS       Orders Placed This Encounter    AMB POC XRAY, FOOT; COMPLETE, 3+ VIEW     ASK ALL FEMALE PATIENTS IF PREGNANT     Order Specific Question:   Reason for Exam     Answer:   PAIN    POC XRAY, ANKLE; 2 VIEWS     Order Specific Question:   Reason for Exam     Answer:   pain    acetaminophen (Tylenol Arthritis Pain) 650 mg TbER     Sig: Take 650 mg by mouth every eight (8) hours.  methocarbamoL (Robaxin) 500 mg tablet     Sig: Take 1 Tablet by mouth two (2) times daily as needed for Muscle Spasm(s). Dispense:  30 Tablet     Refill:  1        Foot x-rays, 3 views, of the right foot:AP lateral and oblique x-rays   Normal alignment, no fracture no solution dislocation. No osteolytic ossicles are seen. 2 views, right ankle AP//LATERAL    Well-healed ankle fracture distal tibia and fibula fractures. There are some OA changes seen across the right ankle joint. I have reviewed the results of the above study. The interpretation of this study is my professional opinion. On this date 10/12/2021 I have spent 30 minutes reviewing previous notes, test results and face to face with the patient discussing the diagnosis and importance of compliance with the treatment plan as well as documenting on the day of the visit. An electronic signature was used to authenticate this note. Disclaimer: Sections of this note are dictated using utilizing voice recognition software, which may have resulted in some phonetic based errors in grammar and contents. Even though attempts were made to correct all the mistakes, some may have been missed, and remained in the body of the document. If questions arise, please contact our department. Claudia Oleary may have a reminder for a \"due or due soon\" health maintenance.  I have asked that she contact her primary care provider for follow-up on this health maintenance.     Martell Murillo, as directed by Hansel  10/12/2021  2:36 PM

## 2021-10-12 ENCOUNTER — OFFICE VISIT (OUTPATIENT)
Dept: ORTHOPEDIC SURGERY | Age: 56
End: 2021-10-12
Payer: MEDICARE

## 2021-10-12 VITALS
HEART RATE: 72 BPM | WEIGHT: 155 LBS | OXYGEN SATURATION: 100 % | BODY MASS INDEX: 25.83 KG/M2 | TEMPERATURE: 97 F | HEIGHT: 65 IN

## 2021-10-12 DIAGNOSIS — M62.838 MUSCLE SPASM: ICD-10-CM

## 2021-10-12 DIAGNOSIS — M25.571 PAIN IN RIGHT ANKLE AND JOINTS OF RIGHT FOOT: ICD-10-CM

## 2021-10-12 DIAGNOSIS — M19.171 POST-TRAUMATIC ARTHRITIS OF RIGHT ANKLE: Primary | ICD-10-CM

## 2021-10-12 PROCEDURE — 3017F COLORECTAL CA SCREEN DOC REV: CPT | Performed by: ORTHOPAEDIC SURGERY

## 2021-10-12 PROCEDURE — G8427 DOCREV CUR MEDS BY ELIG CLIN: HCPCS | Performed by: ORTHOPAEDIC SURGERY

## 2021-10-12 PROCEDURE — G8419 CALC BMI OUT NRM PARAM NOF/U: HCPCS | Performed by: ORTHOPAEDIC SURGERY

## 2021-10-12 PROCEDURE — 99213 OFFICE O/P EST LOW 20 MIN: CPT | Performed by: ORTHOPAEDIC SURGERY

## 2021-10-12 PROCEDURE — 73630 X-RAY EXAM OF FOOT: CPT | Performed by: ORTHOPAEDIC SURGERY

## 2021-10-12 PROCEDURE — 73600 X-RAY EXAM OF ANKLE: CPT | Performed by: ORTHOPAEDIC SURGERY

## 2021-10-12 PROCEDURE — G8510 SCR DEP NEG, NO PLAN REQD: HCPCS | Performed by: ORTHOPAEDIC SURGERY

## 2021-10-12 RX ORDER — METAXALONE 800 MG/1
400 TABLET ORAL 3 TIMES DAILY
Refills: 0 | Status: CANCELLED | OUTPATIENT
Start: 2021-10-12

## 2021-10-12 RX ORDER — DEXTROMETHORPHAN HYDROBROMIDE, GUAIFENESIN 5; 100 MG/5ML; MG/5ML
650 LIQUID ORAL EVERY 8 HOURS
COMMUNITY

## 2021-10-12 RX ORDER — METHOCARBAMOL 500 MG/1
500 TABLET, FILM COATED ORAL
Qty: 30 TABLET | Refills: 1 | Status: SHIPPED | OUTPATIENT
Start: 2021-10-12

## 2021-11-15 NOTE — PROGRESS NOTES
AMBULATORY PROGRESS NOTE      Patient: Cristóbal Celaya             MRN: [id number]     SSN: [id number] Body mass index is 25.13 kg/m². YOB: 1965     AGE: 60 y.o. EX: female    PCP: Nelda Ayoub MD       IMPRESSION //  DIAGNOSIS AND TREATMENT PLAN        Cristóbal Celaya has a diagnosis of:      She is moving to [place]. As such may have her see have her go back as she requested to see Dr. Andrea Navarrete, and/or foot ankle specialist at [facility]. I received Fiona Mackey for assessment of her ankle, she moves there in the next couple of months    DIAGNOSES    No diagnosis found. Orders Placed This Encounter    ubidecarenone/vitamin E mixed (COQ10  PO)     Sig: Take  by mouth. PLAN:    1. I plan on reviewing the pt's liver & kidney panel from her PCP  2. She plans on going to GI doctor regarding her difficulty swallowing. 3. Worknote: For limited standing walking, with some pushing pulling restrictions. At the find the note we wrote for her in the past, and make sure a copy this. That the note will be reflective of no lifting over 25 pounds, no pushing no pulling. But I will copy the note that she had in the past that would reflect some restrictions for her she was to go back to work temporarily, in February, March and short duration in April I think for least 6 or 7-day visits, and a job position that she had similar to what she had in the past..  4. AVS: [facility] & Andrea Navarrete [facility] or Fiona Mackey          RTO: PRN    There are no Patient Instructions on file for this visit. Please follow up with your PCP for any health maintenance as recommended         Cristóbal Celaya  expresses understanding of the diagnosis, treatment plan, and all of their proposed questions were answered to their satisfaction. Patient education has been provided re the diagnoses.          HPI //  Trina Isaacs Donte IS A 60 y.o. female who is a/an  established patient, presenting to my outpatient office for evaluation of  the following chief complaint(s):     Chief Complaint   Patient presents with    Ankle Pain     right ankle       At [facility] presented w/ right ankle pain. Obtain 2-View XR of right ankle and 3-View XR of right foot. Rx of Robaxin BID. I plan on reviewing patient's bloodwork once she receives the results. Continue taking  Lodine. Since LOV continues to endorse right ankle pain. She states she is still taking Lodine. She states she accepted a temporary position that requires some standing. She is planning on seeing her GI doctor today at 1pm. She is having issues with swallowing. Visit Vitals  Pulse 85   Ht 5' 5\" (1.651 m)   Wt 151 lb (68.5 kg)   SpO2 100%   BMI 25.13 kg/m²       Appearance: Alert, well appearing and pleasant patient who is in no distress, oriented to person, place/time, and who follows commands. Normal dress/motor activity/thought processes/memory. This patient is accompanied in the examination room by her  self. Patient arrives to office via: without assistive device:   Psychiatric:  Normal Affect/mood. Judgement, behavior, and conduct are appropriate. Speech normal in context and clarity, memory intact grossly, no involuntary movements - tremors. H EENT (2): Head normocephalic & atraumatic. Eye: pupils are round// EOM are intact // Neck: ROM WNL  // Hearings Intact   Respiratory: Breathing non labored     ANKLE/FOOT right    Gait: normal  Tenderness: mild anterior ankle    Cutaneous:   WNL. Joint Motion:   WNL   Joint / Tendon Stability:  No Ankle or Subtalar instability or joint laxity. No peroneal sublux ability or dislocation  Alignment: neutral Hindfoot,    Neuro Motor/Sensory: NL/NL  Vascular: NL foot/ankle pulses,   Lymphatics: No extremity lymphedema, No calf swelling, no tenderness to calf muscles.           CHART REVIEW Megan Abdul has been experiencing pain and discomfort confirmed as outlined in the pain assessment outlined below.  was reviewed by Sam Wilson MD on 11/16/2021. Pain Assessment  11/16/2021   Location of Pain Ankle   Pain Location Comment -   Location Modifiers Right   Severity of Pain 6   Quality of Pain Aching   Quality of Pain Comment -   Duration of Pain -   Frequency of Pain -   Aggravating Factors -   Aggravating Factors Comment -   Limiting Behavior -   Relieving Factors -   Relieving Factors Comment -   Result of Injury -   Work-Related Injury -   Type of Injury -        Megan Abdul  has a past medical history of Colon polyps, Constipation, Heart murmur, Hemorrhoids, Mitral valve prolapse, Nausea & vomiting, Plantar fasciitis, and Vestibular neuritis. Patients is employed at:         Past Medical History:   Diagnosis Date    Colon polyps     Constipation     Heart murmur     Hemorrhoids     Mitral valve prolapse     Nausea & vomiting     \"VIOLENTLY SICK AFTER TUBAL LIGATION\"    Plantar fasciitis     Vestibular neuritis      Past Surgical History:   Procedure Laterality Date    COLONOSCOPY N/A 10/12/2018    COLONOSCOPY performed by Jose Armando Garcia MD at [address] HX BREAST LUMPECTOMY      right    HX COLONOSCOPY      2010,2013    HX CYST REMOVAL  2010    right breast    HX GYN      hysterectomy    HX HEENT      tonselectomy    HX HEENT      lasik eye surgery    HX OTHER SURGICAL      cystoscopy    HX OTHER SURGICAL  2010    right ovary removed    HX TUBAL LIGATION       Current Outpatient Medications   Medication Sig    ubidecarenone/vitamin E mixed (COQ10  PO) Take  by mouth.  acetaminophen (Tylenol Arthritis Pain) 650 mg TbER Take 650 mg by mouth every eight (8) hours.  methocarbamoL (Robaxin) 500 mg tablet Take 1 Tablet by mouth two (2) times daily as needed for Muscle Spasm(s).     meclizine (ANTIVERT) 25 mg tablet Take  by mouth.  etodolac (LODINE) 400 mg tablet take 1 tablet by mouth twice a day with meals    multivitamin (ONE A DAY) tablet Take 1 Tab by mouth daily.  cholecalciferol, vitamin D3, (VITAMIN D3) 2,000 unit tab Take  by mouth.  alendronate (FOSAMAX) 70 mg tablet Take  by mouth.  busPIRone (BUSPAR) 15 mg tablet Take 15 mg by mouth two (2) times a day.  cycloSPORINE (RESTASIS MULTIDOSE) 0.05 % drop ophthalmic drops 1 Drop two (2) times a day.  calcium-cholecalciferol, d3, (CALCIUM 600 + D) 600-125 mg-unit tab Take  by mouth.  atorvastatin (LIPITOR) 10 mg tablet TAKE 1 TABLET BY MOUTH EVERY DAY (Patient not taking: Reported on 11/16/2021)     No current facility-administered medications for this visit. Allergies   Allergen Reactions    Latex Other (comments)     \"YEAST INFECTION\"    Wasp [Venom-Wasp] Anaphylaxis    Egg Swelling     Thick phlegm     Amoxicillin Other (comments)     Gave pt bad ha's    Flagyl [Metronidazole] Other (comments)     Pt states headaches and vomiting      Urised [Meth-Hyos-Atrp-M Blue-Ba-Phsal] Itching and Swelling     Social History     Occupational History    Not on file   Tobacco Use    Smoking status: Never Smoker    Smokeless tobacco: Never Used   Substance and Sexual Activity    Alcohol use: Yes     Comment: OCCASIONALLY    Drug use: No    Sexual activity: Not on file     Family History   Problem Relation Age of Onset    Cancer Sister     Cancer Father         colon    Cancer Maternal Grandmother     Hypertension Mother     Cancer Mother     Colon Cancer Paternal Aunt     Cancer Paternal Uncle         prostate        DIAGNOSTIC LAB DATA      No results found for: HBA1C, AAH1KNXK, VQL2ABHJ // No results found for: GLU, GLUCPOC     No results found for: OQY7MGLY, CSX2JHOD      No results found for: [place], XQVID3, XQVID, VD3RIA, QRMQ67YUZDV     Drug Screen Most Recent Result Date    No resulted procedures found.            REVIEW OF SYSTEMS : 11/16/2021  ALL BELOW ARE Negative except : SEE HPI     All other systems reviewed and are negative. 12 point review of systems otherwise negative unless noted in HPI. RADIOGRAPHS// IMAGING//DIAGNOSTIC DATA     Orders Placed This Encounter    ubidecarenone/vitamin E mixed (COQ10  PO)           I have personally reviewed the images of the above study. The interpretation of this study is my professional opinion            I have spent  20 minutes reviewing the previous notes, reviewing diagnostic studies [Advanced  Imaging, Diagnostic test results (x-rays)] and had a direct face to face with the patient discussing the diagnosis and importance of compliance with the treatment and plan. There is  discussion for the potential for surgery, answering all questions, as well as documenting patient care coordination for this individual on the day of the visit. Disclaimer:     Sections of this note are dictated using utilizing voice recognition software, which may have resulted in some phonetic based errors in grammar and contents. Even though attempts were made to correct all the mistakes, some may have been missed, and remained in the body of the document. If questions arise, please contact our department. An electronic signature was used to authenticate this note. Thelma López may have a reminder for a \"due or due soon\" health maintenance. I have asked that she contact her primary care provider for follow-up on this health maintenance. There are no Patient Instructions on file for this visit. Please follow up with your PCP for any health maintenance as recommended. Teodoro Alvarez, as dictated by,  Jeanice Seip.   11/16/2021  3:08 PM

## 2021-11-16 ENCOUNTER — OFFICE VISIT (OUTPATIENT)
Dept: ORTHOPEDIC SURGERY | Age: 56
End: 2021-11-16
Payer: MEDICARE

## 2021-11-16 VITALS — WEIGHT: 151 LBS | HEIGHT: 65 IN | OXYGEN SATURATION: 100 % | HEART RATE: 85 BPM | BODY MASS INDEX: 25.16 KG/M2

## 2021-11-16 DIAGNOSIS — M19.171 POST-TRAUMATIC ARTHRITIS OF RIGHT ANKLE: Primary | ICD-10-CM

## 2021-11-16 PROCEDURE — G8419 CALC BMI OUT NRM PARAM NOF/U: HCPCS | Performed by: ORTHOPAEDIC SURGERY

## 2021-11-16 PROCEDURE — 99213 OFFICE O/P EST LOW 20 MIN: CPT | Performed by: ORTHOPAEDIC SURGERY

## 2021-11-16 PROCEDURE — G8432 DEP SCR NOT DOC, RNG: HCPCS | Performed by: ORTHOPAEDIC SURGERY

## 2021-11-16 PROCEDURE — G8427 DOCREV CUR MEDS BY ELIG CLIN: HCPCS | Performed by: ORTHOPAEDIC SURGERY

## 2021-11-16 PROCEDURE — 3017F COLORECTAL CA SCREEN DOC REV: CPT | Performed by: ORTHOPAEDIC SURGERY

## 2021-11-16 NOTE — PATIENT INSTRUCTIONS
Voltaren Gel: Apply 4g 3x to affected area    Dr. Linda Gallardo ([facility]) or Dr. Michelle Velázquez

## 2021-11-16 NOTE — LETTER
11/16/2021 12:12 PM    Ms. Radha Jarvis  [address] [address]           Please allow Ms. Kent to wear athletic shoes 100% of the time while on the job. No prolonged standing and walking for more than 15 minutes at a time.  No heavy lifting over 20 pounds.              Sincerely,      Ratna Lloyd MD

## 2021-11-18 ENCOUNTER — TRANSCRIBE ORDER (OUTPATIENT)
Dept: SCHEDULING | Age: 56
End: 2021-11-18

## 2021-11-19 ENCOUNTER — TRANSCRIBE ORDER (OUTPATIENT)
Dept: SCHEDULING | Age: 56
End: 2021-11-19

## 2021-11-23 ENCOUNTER — HOSPITAL ENCOUNTER (OUTPATIENT)
Dept: GENERAL RADIOLOGY | Age: 56
Discharge: HOME | End: 2021-11-23
Attending: INTERNAL MEDICINE
Payer: MEDICARE

## 2021-11-23 PROCEDURE — 74011000250 HC RX REV CODE- 250: Performed by: INTERNAL MEDICINE

## 2021-11-23 PROCEDURE — 74220 X-RAY XM ESOPHAGUS 1CNTRST: CPT

## 2021-11-23 RX ADMIN — BARIUM SULFATE 176 G: 960 POWDER, FOR SUSPENSION ORAL at 09:30

## 2021-11-23 RX ADMIN — BARIUM SULFATE 135 ML: 980 POWDER, FOR SUSPENSION ORAL at 09:30

## 2021-11-23 RX ADMIN — ANTACID/ANTIFLATULENT 4 G: 380; 550; 10; 10 GRANULE, EFFERVESCENT ORAL at 09:30

## 2021-11-30 ENCOUNTER — TELEPHONE (OUTPATIENT)
Dept: ORTHOPEDIC SURGERY | Age: 56
End: 2021-11-30

## 2021-11-30 NOTE — TELEPHONE ENCOUNTER
Patient called and said she saw Jelena Padilla on 11/16/21 for her Right Ankle. Patient said that Jelena Padilla had requested some test results from [facility]. Patient does not know the name of the test, but it was something  had requested. Patient said that [facility] told her they were faxing everything over on 11/24/2021 to the [facility] location. Patient would like to know if  received it. Patient tel. [phone].

## 2021-12-01 NOTE — TELEPHONE ENCOUNTER
Patient called in regards to this. She stated her PCP advised her it was faxed to [phone] on 11/24 at 6:00 p.m., and they have a confirmation it was received. She stated her PCP advised her our clinical supervisor can contact them to get the results over the phone. Patient provided PCP phone number [phone]. Patient is requesting a call back once this is completed and can be reached at [phone].

## 2021-12-13 NOTE — TELEPHONE ENCOUNTER
Patient called again and would like to know if  office received the Lab Work from Dr. Karlo Pablo office. Dr. Karlo Pablo   Tel. [phone]. Patient is requesting a call back letting her know if we did or did not receive the lab results. Patient tel. [phone].

## 2021-12-21 NOTE — TELEPHONE ENCOUNTER
Dr. Saundra Gonzalez' office will be trying to resend the lab reports again today. She was given [phone] and [phone]. For information only.

## 2021-12-29 NOTE — TELEPHONE ENCOUNTER
Patient called to see if lab results were received from Dr. Jarod Newton' office, 2 documents are scanned dated 12/21 that appear to be what patient inquired about. She mentioned that we were to call in a prescription for pain medication after these results were received and she's unhappy that this was not done. I offered to have this taken care of in her physicians absence and the patient declined. She says she's waited this long for resolution and is requesting to speak to management instead - she doesn't want the issue resolved until she speaks to practice management because she feels it will be covering up the problem. Patient was advised her message will be forwarded to management when they return next week.

## 2022-01-07 DIAGNOSIS — M25.571 ACUTE RIGHT ANKLE PAIN: ICD-10-CM

## 2022-01-07 DIAGNOSIS — S82.841S CLOSED BIMALLEOLAR FRACTURE OF RIGHT ANKLE, SEQUELA: ICD-10-CM

## 2022-01-07 RX ORDER — ETODOLAC 400 MG/1
400 TABLET, FILM COATED ORAL 2 TIMES DAILY
Qty: 60 TABLET | Refills: 2 | Status: SHIPPED | OUTPATIENT
Start: 2022-01-07 | End: 2022-01-09

## 2022-01-07 NOTE — PROGRESS NOTES
Orders Placed This Encounter    etodolac (LODINE) 400 mg tablet     Sig: Take 1 Tablet by mouth two (2) times a day.      Dispense:  60 Tablet     Refill:  2              Rx sent to the pharmacy        Stephen Buckner MD  1/7/2022  6:38 PM

## 2022-01-09 DIAGNOSIS — S82.841S CLOSED BIMALLEOLAR FRACTURE OF RIGHT ANKLE, SEQUELA: ICD-10-CM

## 2022-01-09 DIAGNOSIS — M25.571 ACUTE RIGHT ANKLE PAIN: ICD-10-CM

## 2022-01-09 RX ORDER — ETODOLAC 400 MG/1
400 TABLET, FILM COATED ORAL 2 TIMES DAILY
Qty: 60 TABLET | Refills: 2 | Status: SHIPPED | OUTPATIENT
Start: 2022-01-09 | End: 2022-04-04

## 2022-01-10 NOTE — PROGRESS NOTES
Orders Placed This Encounter    etodolac (LODINE) 400 mg tablet     Sig: Take 1 Tablet by mouth two (2) times a day.      Dispense:  60 Tablet     Refill:  2              Rx sent to the pharmacy  [facility] Chelsey Burton MD  1/9/2022  8:10 PM

## 2022-03-19 PROBLEM — R06.02 SOB (SHORTNESS OF BREATH): Status: ACTIVE | Noted: 2018-09-05

## 2022-03-19 PROBLEM — S82.839A FRACTURE OF DISTAL END OF TIBIA WITH FIBULA: Status: ACTIVE | Noted: 2017-08-30

## 2022-03-19 PROBLEM — R31.29 MICROSCOPIC HEMATURIA: Status: ACTIVE | Noted: 2021-08-03

## 2022-03-19 PROBLEM — R00.2 PALPITATIONS: Status: ACTIVE | Noted: 2018-09-05

## 2022-03-19 PROBLEM — S82.309A FRACTURE OF DISTAL END OF TIBIA WITH FIBULA: Status: ACTIVE | Noted: 2017-08-30

## 2022-03-20 PROBLEM — R42 DIZZINESS: Status: ACTIVE | Noted: 2018-09-05

## 2022-04-04 DIAGNOSIS — M25.571 ACUTE RIGHT ANKLE PAIN: ICD-10-CM

## 2022-04-04 DIAGNOSIS — S82.841S CLOSED BIMALLEOLAR FRACTURE OF RIGHT ANKLE, SEQUELA: ICD-10-CM

## 2022-04-04 RX ORDER — ETODOLAC 400 MG/1
TABLET, FILM COATED ORAL
Qty: 60 TABLET | Refills: 2 | Status: SHIPPED | OUTPATIENT
Start: 2022-04-04

## 2022-11-04 NOTE — TELEPHONE ENCOUNTER
Called [employer] to initiate appeal on patients behalf. I was advised that no information regarding a denial, appeal or any calls/correspondence from the patient could be found on the patients file. If patient has an appeal letter, she can initiate the appeal.     Called patient to discuss her requests. She said that she spoke with [employer] and they informed her that our office was sent the denial as well. I advised her that we do not have the denial (that I am aware of) and asked her to send me a copy via [facility]. Patient stated that she no long has the document since [employer] told her that we received it. I advised that patient that she could either call [employer] and appeal or we could order the PT. Patient states that she had continued going to PT (and the [employer] and completed HEP), but had to stop going to PT and the [employer] due to COVID-19 restrictions. She has agreed to resume the PT, continue HEP, activity modification and NSAIDS. Please mail order for PT to patients home address.     Orders Placed This Encounter    REFERRAL TO PHYSICAL THERAPY     Referral Priority:   Routine     Referral Type:   PT/OT/ST     Referral Reason:   Specialty Services Required     Requested Specialty:   Physical Therapy     Number of Visits Requested:   [facility], [place], [facility]  5/28/2020  2:13 PM · Continue lexapro

## 2024-08-08 ENCOUNTER — TELEPHONE (OUTPATIENT)
Age: 59
End: 2024-08-08

## 2024-08-08 NOTE — TELEPHONE ENCOUNTER
Pt is calling to have MARY to send information to the [employer] in [place].The medical release form and information request in chart from 7/2 and 6/19 already signed by pt.    The needed information is:    -Current diagnoses  -Medication list  -Treatment history  -Medical records useful in determing DVRS eligibility from December 2021- present    They need this information in order for them to assist pt with finding a job with her restrictions.    Fax number :  [phone]    Pt's callback number is [phone]

## (undated) DEVICE — NEEDLE NRV BLK 22GA L2IN 30DEG INSUL BVL EXTN SET STIMUPLEX

## (undated) DEVICE — ABDOMINAL PAD: Brand: DERMACEA

## (undated) DEVICE — Device

## (undated) DEVICE — SPLINT CAST PLASTER 5X15FT -- DYNACAST

## (undated) DEVICE — SCREW BNE L28MM DIA3.5MM PROX TIB S STL ST FULL THRD W/ T15
Type: IMPLANTABLE DEVICE | Site: TIBIA | Status: NON-FUNCTIONAL
Removed: 2017-08-30

## (undated) DEVICE — OCCLUSIVE GAUZE STRIP,3% BISMUTH TRIBROMOPHENATE IN PETROLATUM BLEND: Brand: XEROFORM

## (undated) DEVICE — SUTURE VCRL SZ 2-0 L36IN ABSRB UD L36MM CT-1 1/2 CIR J945H

## (undated) DEVICE — PADDING CAST W4INXL4YD ST COT COHESIVE HND TEARABLE SPEC

## (undated) DEVICE — 2.0MM DRILL BIT WITH DEPTH MARK/QC/140MM

## (undated) DEVICE — HEX-LOCKING BLADE ELECTRODE: Brand: EDGE

## (undated) DEVICE — FLUFF AND POLYMER UNDERPAD,EXTRA HEAVY: Brand: WINGS

## (undated) DEVICE — CATH IV SAFE STR 22GX1IN BLU -- PROTECTIV PLUS

## (undated) DEVICE — STOCKING COMPR M L16-18IN LNG 19MMHG ANK 8-9IN CALF 12-15IN

## (undated) DEVICE — BIT DRL RMFG 2.8X165MM DISP -- LAWSON OEM ITEM 130244

## (undated) DEVICE — THREE-QUARTER SHEET: Brand: CONVERTORS

## (undated) DEVICE — PACK SURG BSHR TOT KNEE LF

## (undated) DEVICE — GOWN,REINFORCED,POLY,AURORA,XLARGE,STRL: Brand: MEDLINE

## (undated) DEVICE — SOFT SILICONE HYDROCELLULAR SACRUM DRESSING WITH LOCK AWAY LAYER: Brand: ALLEVYN LIFE SACRUM (LARGE) PACK OF 10

## (undated) DEVICE — SCREW BNE L18MM DIA2.7MM MTPHSEAL S STL ST FULL THRD T8
Type: IMPLANTABLE DEVICE | Site: FIBULA | Status: NON-FUNCTIONAL
Removed: 2017-08-30

## (undated) DEVICE — FLEX ADVANTAGE 1500CC: Brand: FLEX ADVANTAGE

## (undated) DEVICE — SUTURE MCRYL SZ 3 0 L18IN ABSRB VLT CT 1 L36MM 1 2 CIR TAPR Y738D

## (undated) DEVICE — REM POLYHESIVE ADULT PATIENT RETURN ELECTRODE: Brand: VALLEYLAB

## (undated) DEVICE — NEEDLE NRV BLK 21GA L4IN 30DEG INSUL BVL EXTN SET STIMUPLEX

## (undated) DEVICE — 3M™ MICROFOAM™ TAPE 1528-4: Brand: 3M™ MICROFOAM™

## (undated) DEVICE — STAPLER SKIN H3.9MM WIRE DIA0.58MM CRWN 6.9MM 35 STPL FIX

## (undated) DEVICE — MEDI-VAC NON-CONDUCTIVE SUCTION TUBING: Brand: CARDINAL HEALTH

## (undated) DEVICE — TRAY CATH OD16FR SIL URIN M STATLOK STBL DEV SURSTP

## (undated) DEVICE — COVER LT HNDL BLU STRL -- MEDICHOICE

## (undated) DEVICE — KENDALL SCD EXPRESS SLEEVES, KNEE LENGTH, MEDIUM: Brand: KENDALL SCD

## (undated) DEVICE — DRAPE TWL SURG 16X26IN BLU ORB04] ALLCARE INC]

## (undated) DEVICE — SCREW BNE L36MM DIA2.7MM ANK S STL ST VAR ANG LOK FULL THRD
Type: IMPLANTABLE DEVICE | Site: TIBIA | Status: NON-FUNCTIONAL
Removed: 2017-08-30

## (undated) DEVICE — GAUZE SPONGES,16 PLY: Brand: CURITY

## (undated) DEVICE — SOLUTION IV 1000ML 0.9% SOD CHL

## (undated) DEVICE — ZIMMER® STERILE DISPOSABLE TOURNIQUET CUFF WITH PROTECTIVE SLEEVE AND PLC, DUAL PORT, SINGLE BLADDER, 34 IN. (86 CM)

## (undated) DEVICE — 2.7MM METAPHYSEAL SCR SLF-TPNG W/T8 STRDRV RECESS/20MM
Type: IMPLANTABLE DEVICE | Site: FIBULA | Status: NON-FUNCTIONAL
Removed: 2017-08-30

## (undated) DEVICE — 2.5MM DRILL BIT/QC/GOLD/110MM

## (undated) DEVICE — INTENDED FOR TISSUE SEPARATION, AND OTHER PROCEDURES THAT REQUIRE A SHARP SURGICAL BLADE TO PUNCTURE OR CUT.: Brand: BARD-PARKER SAFETY BLADES SIZE 10, STERILE

## (undated) DEVICE — INTENDED FOR TISSUE SEPARATION, AND OTHER PROCEDURES THAT REQUIRE A SHARP SURGICAL BLADE TO PUNCTURE OR CUT.: Brand: BARD-PARKER SAFETY BLADES SIZE 15, STERILE

## (undated) DEVICE — KIT CLN UP BON SECOURS MARYV

## (undated) DEVICE — C-ARMOR C-ARM EQUIPMENT COVERS CLEAR STERILE UNIVERSAL FIT 12 PER CASE: Brand: C-ARMOR

## (undated) DEVICE — 3M™ BAIR PAWS FLEX™ WARMING GOWN, STANDARD, 20 PER CASE 81003: Brand: BAIR PAWS™

## (undated) DEVICE — SCREW BNE L16MM DIA2.7MM ANK S STL ST VAR ANG LOK FULL THRD
Type: IMPLANTABLE DEVICE | Site: FIBULA | Status: NON-FUNCTIONAL
Removed: 2017-08-30

## (undated) DEVICE — AIRLIFE™ NASAL OXYGEN CANNULA CURVED, NONFLARED TIP WITH 14 FOOT (4.3 M) CRUSH-RESISTANT TUBING, OVER-THE-EAR STYLE: Brand: AIRLIFE™

## (undated) DEVICE — BLADE ASSEMB CLP HAIR FINE --

## (undated) DEVICE — BIT DRL L110MM DIA3.5MM QUIK CPL W/O STP REUSE